# Patient Record
Sex: MALE | Race: WHITE | NOT HISPANIC OR LATINO | URBAN - METROPOLITAN AREA
[De-identification: names, ages, dates, MRNs, and addresses within clinical notes are randomized per-mention and may not be internally consistent; named-entity substitution may affect disease eponyms.]

---

## 2023-03-25 ENCOUNTER — INPATIENT (INPATIENT)
Facility: HOSPITAL | Age: 83
LOS: 11 days | Discharge: ANOTHER HOSPITAL | End: 2023-04-06
Attending: HOSPITALIST | Admitting: HOSPITALIST
Payer: OTHER GOVERNMENT

## 2023-03-25 VITALS
SYSTOLIC BLOOD PRESSURE: 108 MMHG | OXYGEN SATURATION: 97 % | HEIGHT: 67 IN | RESPIRATION RATE: 26 BRPM | TEMPERATURE: 99 F | DIASTOLIC BLOOD PRESSURE: 54 MMHG | HEART RATE: 100 BPM | WEIGHT: 160.06 LBS

## 2023-03-25 LAB
ALBUMIN SERPL ELPH-MCNC: 2.2 G/DL — LOW (ref 3.3–5)
ALP SERPL-CCNC: 106 U/L — SIGNIFICANT CHANGE UP (ref 40–120)
ALT FLD-CCNC: 47 U/L — SIGNIFICANT CHANGE UP (ref 12–78)
ANION GAP SERPL CALC-SCNC: 5 MMOL/L — SIGNIFICANT CHANGE UP (ref 5–17)
AST SERPL-CCNC: 53 U/L — HIGH (ref 15–37)
BASOPHILS # BLD AUTO: 0 K/UL — SIGNIFICANT CHANGE UP (ref 0–0.2)
BASOPHILS NFR BLD AUTO: 0 % — SIGNIFICANT CHANGE UP (ref 0–2)
BILIRUB SERPL-MCNC: 0.4 MG/DL — SIGNIFICANT CHANGE UP (ref 0.2–1.2)
BUN SERPL-MCNC: 27 MG/DL — HIGH (ref 7–23)
CALCIUM SERPL-MCNC: 8.8 MG/DL — SIGNIFICANT CHANGE UP (ref 8.5–10.1)
CHLORIDE SERPL-SCNC: 103 MMOL/L — SIGNIFICANT CHANGE UP (ref 96–108)
CK MB BLD-MCNC: <1.1 % — SIGNIFICANT CHANGE UP (ref 0–3.5)
CK MB CFR SERPL CALC: <1 NG/ML — SIGNIFICANT CHANGE UP (ref 0.5–3.6)
CK SERPL-CCNC: 87 U/L — SIGNIFICANT CHANGE UP (ref 26–308)
CO2 SERPL-SCNC: 28 MMOL/L — SIGNIFICANT CHANGE UP (ref 22–31)
CREAT SERPL-MCNC: 1.32 MG/DL — HIGH (ref 0.5–1.3)
EGFR: 55 ML/MIN/1.73M2 — LOW
EOSINOPHIL # BLD AUTO: 0 K/UL — SIGNIFICANT CHANGE UP (ref 0–0.5)
EOSINOPHIL NFR BLD AUTO: 0 % — SIGNIFICANT CHANGE UP (ref 0–6)
FLUAV AG NPH QL: SIGNIFICANT CHANGE UP
FLUBV AG NPH QL: SIGNIFICANT CHANGE UP
GLUCOSE BLDC GLUCOMTR-MCNC: 127 MG/DL — HIGH (ref 70–99)
GLUCOSE SERPL-MCNC: 156 MG/DL — HIGH (ref 70–99)
HCT VFR BLD CALC: 27 % — LOW (ref 39–50)
HGB BLD-MCNC: 8.4 G/DL — LOW (ref 13–17)
HYPOCHROMIA BLD QL: SLIGHT — SIGNIFICANT CHANGE UP
LYMPHOCYTES # BLD AUTO: 0.7 K/UL — LOW (ref 1–3.3)
LYMPHOCYTES # BLD AUTO: 6 % — LOW (ref 13–44)
MACROCYTES BLD QL: SLIGHT — SIGNIFICANT CHANGE UP
MAGNESIUM SERPL-MCNC: 2.5 MG/DL — SIGNIFICANT CHANGE UP (ref 1.6–2.6)
MANUAL SMEAR VERIFICATION: SIGNIFICANT CHANGE UP
MCHC RBC-ENTMCNC: 31.1 G/DL — LOW (ref 32–36)
MCHC RBC-ENTMCNC: 33.1 PG — SIGNIFICANT CHANGE UP (ref 27–34)
MCV RBC AUTO: 106.3 FL — HIGH (ref 80–100)
MICROCYTES BLD QL: SLIGHT — SIGNIFICANT CHANGE UP
MONOCYTES # BLD AUTO: 0.35 K/UL — SIGNIFICANT CHANGE UP (ref 0–0.9)
MONOCYTES NFR BLD AUTO: 3 % — SIGNIFICANT CHANGE UP (ref 2–14)
NEUTROPHILS # BLD AUTO: 10.67 K/UL — HIGH (ref 1.8–7.4)
NEUTROPHILS NFR BLD AUTO: 91 % — HIGH (ref 43–77)
NRBC # BLD: 0 /100 — SIGNIFICANT CHANGE UP (ref 0–0)
NRBC # BLD: SIGNIFICANT CHANGE UP /100 WBCS (ref 0–0)
NT-PROBNP SERPL-SCNC: HIGH PG/ML (ref 0–450)
OVALOCYTES BLD QL SMEAR: SLIGHT — SIGNIFICANT CHANGE UP
PLAT MORPH BLD: NORMAL — SIGNIFICANT CHANGE UP
PLATELET # BLD AUTO: SIGNIFICANT CHANGE UP K/UL (ref 150–400)
POTASSIUM SERPL-MCNC: 5.4 MMOL/L — HIGH (ref 3.5–5.3)
POTASSIUM SERPL-SCNC: 5.4 MMOL/L — HIGH (ref 3.5–5.3)
PROT SERPL-MCNC: 6.5 GM/DL — SIGNIFICANT CHANGE UP (ref 6–8.3)
RBC # BLD: 2.54 M/UL — LOW (ref 4.2–5.8)
RBC # FLD: 17.3 % — HIGH (ref 10.3–14.5)
RBC BLD AUTO: SIGNIFICANT CHANGE UP
SARS-COV-2 RNA SPEC QL NAA+PROBE: SIGNIFICANT CHANGE UP
SODIUM SERPL-SCNC: 136 MMOL/L — SIGNIFICANT CHANGE UP (ref 135–145)
TROPONIN I, HIGH SENSITIVITY RESULT: 35.2 NG/L — SIGNIFICANT CHANGE UP
TROPONIN I, HIGH SENSITIVITY RESULT: 36.1 NG/L — SIGNIFICANT CHANGE UP
WBC # BLD: 11.72 K/UL — HIGH (ref 3.8–10.5)
WBC # FLD AUTO: 11.72 K/UL — HIGH (ref 3.8–10.5)

## 2023-03-25 PROCEDURE — 99285 EMERGENCY DEPT VISIT HI MDM: CPT

## 2023-03-25 PROCEDURE — 99222 1ST HOSP IP/OBS MODERATE 55: CPT

## 2023-03-25 PROCEDURE — 71045 X-RAY EXAM CHEST 1 VIEW: CPT | Mod: 26

## 2023-03-25 RX ORDER — DEXTROSE 50 % IN WATER 50 %
12.5 SYRINGE (ML) INTRAVENOUS ONCE
Refills: 0 | Status: DISCONTINUED | OUTPATIENT
Start: 2023-03-25 | End: 2023-04-06

## 2023-03-25 RX ORDER — CLOPIDOGREL BISULFATE 75 MG/1
75 TABLET, FILM COATED ORAL DAILY
Refills: 0 | Status: DISCONTINUED | OUTPATIENT
Start: 2023-03-25 | End: 2023-04-06

## 2023-03-25 RX ORDER — LIDOCAINE 4 G/100G
1 CREAM TOPICAL
Refills: 0 | DISCHARGE

## 2023-03-25 RX ORDER — RELUGOLIX 120 MG/1
1 TABLET, FILM COATED ORAL
Refills: 0 | DISCHARGE

## 2023-03-25 RX ORDER — LANOLIN ALCOHOL/MO/W.PET/CERES
3 CREAM (GRAM) TOPICAL AT BEDTIME
Refills: 0 | Status: DISCONTINUED | OUTPATIENT
Start: 2023-03-25 | End: 2023-04-06

## 2023-03-25 RX ORDER — FAMOTIDINE 10 MG/ML
1 INJECTION INTRAVENOUS
Refills: 0 | DISCHARGE

## 2023-03-25 RX ORDER — SODIUM CHLORIDE 9 MG/ML
1000 INJECTION, SOLUTION INTRAVENOUS
Refills: 0 | Status: DISCONTINUED | OUTPATIENT
Start: 2023-03-25 | End: 2023-04-06

## 2023-03-25 RX ORDER — SPIRONOLACTONE 25 MG/1
1 TABLET, FILM COATED ORAL
Refills: 0 | DISCHARGE

## 2023-03-25 RX ORDER — FOLIC ACID 0.8 MG
1 TABLET ORAL
Refills: 0 | DISCHARGE

## 2023-03-25 RX ORDER — DEXTROSE 50 % IN WATER 50 %
15 SYRINGE (ML) INTRAVENOUS ONCE
Refills: 0 | Status: DISCONTINUED | OUTPATIENT
Start: 2023-03-25 | End: 2023-04-06

## 2023-03-25 RX ORDER — ATORVASTATIN CALCIUM 80 MG/1
80 TABLET, FILM COATED ORAL AT BEDTIME
Refills: 0 | Status: DISCONTINUED | OUTPATIENT
Start: 2023-03-25 | End: 2023-04-06

## 2023-03-25 RX ORDER — METOPROLOL TARTRATE 50 MG
1 TABLET ORAL
Refills: 0 | DISCHARGE

## 2023-03-25 RX ORDER — APIXABAN 2.5 MG/1
1 TABLET, FILM COATED ORAL
Refills: 0 | DISCHARGE

## 2023-03-25 RX ORDER — MIRABEGRON 50 MG/1
1 TABLET, EXTENDED RELEASE ORAL
Refills: 0 | DISCHARGE

## 2023-03-25 RX ORDER — CLOPIDOGREL BISULFATE 75 MG/1
1 TABLET, FILM COATED ORAL
Refills: 0 | DISCHARGE

## 2023-03-25 RX ORDER — FLUTICASONE PROPIONATE AND SALMETEROL 50; 250 UG/1; UG/1
1 POWDER ORAL; RESPIRATORY (INHALATION)
Refills: 0 | DISCHARGE

## 2023-03-25 RX ORDER — ONDANSETRON 8 MG/1
4 TABLET, FILM COATED ORAL EVERY 8 HOURS
Refills: 0 | Status: DISCONTINUED | OUTPATIENT
Start: 2023-03-25 | End: 2023-04-06

## 2023-03-25 RX ORDER — METOPROLOL TARTRATE 50 MG
25 TABLET ORAL DAILY
Refills: 0 | Status: DISCONTINUED | OUTPATIENT
Start: 2023-03-25 | End: 2023-03-27

## 2023-03-25 RX ORDER — TAMSULOSIN HYDROCHLORIDE 0.4 MG/1
1 CAPSULE ORAL
Refills: 0 | DISCHARGE

## 2023-03-25 RX ORDER — BUDESONIDE AND FORMOTEROL FUMARATE DIHYDRATE 160; 4.5 UG/1; UG/1
2 AEROSOL RESPIRATORY (INHALATION)
Refills: 0 | Status: DISCONTINUED | OUTPATIENT
Start: 2023-03-25 | End: 2023-04-06

## 2023-03-25 RX ORDER — FUROSEMIDE 40 MG
1 TABLET ORAL
Refills: 0 | DISCHARGE

## 2023-03-25 RX ORDER — INSULIN LISPRO 100/ML
VIAL (ML) SUBCUTANEOUS AT BEDTIME
Refills: 0 | Status: DISCONTINUED | OUTPATIENT
Start: 2023-03-25 | End: 2023-04-06

## 2023-03-25 RX ORDER — MIRTAZAPINE 45 MG/1
1 TABLET, ORALLY DISINTEGRATING ORAL
Refills: 0 | DISCHARGE

## 2023-03-25 RX ORDER — SPIRONOLACTONE 25 MG/1
25 TABLET, FILM COATED ORAL DAILY
Refills: 0 | Status: DISCONTINUED | OUTPATIENT
Start: 2023-03-25 | End: 2023-03-27

## 2023-03-25 RX ORDER — TAMSULOSIN HYDROCHLORIDE 0.4 MG/1
0.4 CAPSULE ORAL AT BEDTIME
Refills: 0 | Status: DISCONTINUED | OUTPATIENT
Start: 2023-03-25 | End: 2023-04-06

## 2023-03-25 RX ORDER — MIRTAZAPINE 45 MG/1
30 TABLET, ORALLY DISINTEGRATING ORAL AT BEDTIME
Refills: 0 | Status: DISCONTINUED | OUTPATIENT
Start: 2023-03-25 | End: 2023-04-06

## 2023-03-25 RX ORDER — DEXTROSE 50 % IN WATER 50 %
25 SYRINGE (ML) INTRAVENOUS ONCE
Refills: 0 | Status: DISCONTINUED | OUTPATIENT
Start: 2023-03-25 | End: 2023-04-06

## 2023-03-25 RX ORDER — APIXABAN 2.5 MG/1
2.5 TABLET, FILM COATED ORAL
Refills: 0 | Status: DISCONTINUED | OUTPATIENT
Start: 2023-03-25 | End: 2023-04-06

## 2023-03-25 RX ORDER — ACETAMINOPHEN 500 MG
650 TABLET ORAL EVERY 6 HOURS
Refills: 0 | Status: DISCONTINUED | OUTPATIENT
Start: 2023-03-25 | End: 2023-04-06

## 2023-03-25 RX ORDER — PREGABALIN 225 MG/1
1000 CAPSULE ORAL DAILY
Refills: 0 | Status: DISCONTINUED | OUTPATIENT
Start: 2023-03-25 | End: 2023-04-06

## 2023-03-25 RX ORDER — TIOTROPIUM BROMIDE 18 UG/1
1 CAPSULE ORAL; RESPIRATORY (INHALATION)
Refills: 0 | DISCHARGE

## 2023-03-25 RX ORDER — FOLIC ACID 0.8 MG
1 TABLET ORAL DAILY
Refills: 0 | Status: DISCONTINUED | OUTPATIENT
Start: 2023-03-25 | End: 2023-04-06

## 2023-03-25 RX ORDER — GLUCAGON INJECTION, SOLUTION 0.5 MG/.1ML
1 INJECTION, SOLUTION SUBCUTANEOUS ONCE
Refills: 0 | Status: DISCONTINUED | OUTPATIENT
Start: 2023-03-25 | End: 2023-04-06

## 2023-03-25 RX ORDER — FUROSEMIDE 40 MG
40 TABLET ORAL
Refills: 0 | Status: DISCONTINUED | OUTPATIENT
Start: 2023-03-25 | End: 2023-03-30

## 2023-03-25 RX ORDER — INSULIN LISPRO 100/ML
VIAL (ML) SUBCUTANEOUS
Refills: 0 | Status: DISCONTINUED | OUTPATIENT
Start: 2023-03-25 | End: 2023-04-06

## 2023-03-25 RX ORDER — ATORVASTATIN CALCIUM 80 MG/1
1 TABLET, FILM COATED ORAL
Refills: 0 | DISCHARGE

## 2023-03-25 RX ORDER — TIOTROPIUM BROMIDE 18 UG/1
2 CAPSULE ORAL; RESPIRATORY (INHALATION) DAILY
Refills: 0 | Status: DISCONTINUED | OUTPATIENT
Start: 2023-03-25 | End: 2023-04-06

## 2023-03-25 RX ADMIN — Medication 40 MILLIGRAM(S): at 19:00

## 2023-03-25 RX ADMIN — ATORVASTATIN CALCIUM 80 MILLIGRAM(S): 80 TABLET, FILM COATED ORAL at 23:26

## 2023-03-25 RX ADMIN — CLOPIDOGREL BISULFATE 75 MILLIGRAM(S): 75 TABLET, FILM COATED ORAL at 22:15

## 2023-03-25 RX ADMIN — TAMSULOSIN HYDROCHLORIDE 0.4 MILLIGRAM(S): 0.4 CAPSULE ORAL at 23:27

## 2023-03-25 RX ADMIN — MIRTAZAPINE 30 MILLIGRAM(S): 45 TABLET, ORALLY DISINTEGRATING ORAL at 23:26

## 2023-03-25 RX ADMIN — APIXABAN 2.5 MILLIGRAM(S): 2.5 TABLET, FILM COATED ORAL at 22:15

## 2023-03-25 NOTE — H&P ADULT - NSHPLABSRESULTS_GEN_ALL_CORE
T(C): 36.8 (03-25-23 @ 19:33), Max: 37.2 (03-25-23 @ 11:15)  HR: 86 (03-25-23 @ 19:33) (81 - 100)  BP: 91/57 (03-25-23 @ 19:33) (91/57 - 108/54)  RR: 23 (03-25-23 @ 19:33) (23 - 26)  SpO2: 100% (03-25-23 @ 19:33) (97% - 100%)                        8.4    11.72 )-----------( CLUMPED    ( 25 Mar 2023 12:15 )             27.0     03-25    136  |  103  |  27<H>  ----------------------------<  156<H>  5.4<H>   |  28  |  1.32<H>    Ca    8.8      25 Mar 2023 12:15    TPro  6.5  /  Alb  2.2<L>  /  TBili  0.4  /  DBili  x   /  AST  53<H>  /  ALT  47  /  AlkPhos  106  03-25    LIVER FUNCTIONS - ( 25 Mar 2023 12:15 )  Alb: 2.2 g/dL / Pro: 6.5 gm/dL / ALK PHOS: 106 U/L / ALT: 47 U/L / AST: 53 U/L / GGT: x           < from: Xray Chest 1 View AP/PA. (03.25.23 @ 12:53) >    IMPRESSION:    Bibasilar patchy opacities likely atelectasis. Underlying infection is   not excluded.  Small bilateral pleural effusions    --- End of Report ---    < end of copied text >    EKG - NSR at 86 bpm, nonsp T changes    MEDICATIONS  (STANDING):  apixaban 2.5 milliGRAM(s) Oral two times a day  clopidogrel Tablet 75 milliGRAM(s) Oral daily  dextrose 5%. 1000 milliLiter(s) (50 mL/Hr) IV Continuous <Continuous>  dextrose 5%. 1000 milliLiter(s) (100 mL/Hr) IV Continuous <Continuous>  dextrose 50% Injectable 25 Gram(s) IV Push once  dextrose 50% Injectable 12.5 Gram(s) IV Push once  dextrose 50% Injectable 25 Gram(s) IV Push once  furosemide   Injectable 40 milliGRAM(s) IV Push two times a day  glucagon  Injectable 1 milliGRAM(s) IntraMuscular once  insulin lispro (ADMELOG) corrective regimen sliding scale   SubCutaneous three times a day before meals  insulin lispro (ADMELOG) corrective regimen sliding scale   SubCutaneous at bedtime  metoprolol succinate ER 25 milliGRAM(s) Oral daily  spironolactone 25 milliGRAM(s) Oral daily    MEDICATIONS  (PRN):  acetaminophen     Tablet .. 650 milliGRAM(s) Oral every 6 hours PRN Temp greater or equal to 38C (100.4F), Mild Pain (1 - 3)  aluminum hydroxide/magnesium hydroxide/simethicone Suspension 30 milliLiter(s) Oral every 4 hours PRN Dyspepsia  dextrose Oral Gel 15 Gram(s) Oral once PRN Blood Glucose LESS THAN 70 milliGRAM(s)/deciliter  melatonin 3 milliGRAM(s) Oral at bedtime PRN Insomnia  ondansetron Injectable 4 milliGRAM(s) IV Push every 8 hours PRN Nausea and/or Vomiting

## 2023-03-25 NOTE — PATIENT PROFILE ADULT - FUNCTIONAL ASSESSMENT - BASIC MOBILITY 6.
1-calculated by average/Not able to assess (calculate score using [facility] averaging method)

## 2023-03-25 NOTE — DOWNTIME INTERRUPTION NOTE - WHICH MANUAL FORMS INITIATED?
TRIAGE  Nursing note/Nursing reassessment  Progress note  Peripheral IV  Fall assessment / SBIRT pre screen  Safety screen  Provider Note  Downtime Imaging orders/tracker  Initial/Medication orders  Admit orders
ER DOWNTIME FORMS, vital signs, assessments, progress notes completed

## 2023-03-25 NOTE — H&P ADULT - HISTORY OF PRESENT ILLNESS
83 y/o M w/ PMHx of CHF, COPD, Prostate Ca, DM type 2, Atrial Flutter on Eliquis, EMANUEL, recently admitted at the [facility] for 4 weeks for PNA, CHF, s/p RT for his Prostate as well, discharged on Life Vest, sent in from  rehab for SOB. Pt reports SOB began the day he was discharged and has been getting worse. Pt also reports nonproductive wet cough related to PNA which he was treated for with IV abx. Pt reports mild L sided chest tightness, denies palpitations or dizziness. Pt reports a PEG tube was placed as he was not eating, but pt reports his appetite is back and he is eating.     Pt initially placed on Bi-PAP on arrival in ED, now SPO2 98% on 4L NC

## 2023-03-25 NOTE — PATIENT PROFILE ADULT - FALL HARM RISK - HARM RISK INTERVENTIONS

## 2023-03-25 NOTE — H&P ADULT - NSICDXPASTMEDICALHX_GEN_ALL_CORE_FT
PAST MEDICAL HISTORY:  Chronic CHF     Chronic kidney disease (CKD)     History of COPD     HLD (hyperlipidemia)     HTN (hypertension)     EMANUEL on CPAP     Prostate cancer     Type 2 diabetes mellitus

## 2023-03-25 NOTE — H&P ADULT - ASSESSMENT
81 y/o M w/ PMHx of CHF, COPD, Prostate Ca, DM type 2, CAD s/p MI Atrial Flutter on Eliquis, [facility], CHF, s/p RT for his Prostate as well, discharged on Life Vest, sent in from  rehab for SOB, pt being admitted for acute on chronic systolic CHF    # Acute on chronic systolic CHF  - start Lasix 40mg IV BID  - Tele monitoring  - Trend troponins  - Follow up 2D Echo  - Strict I/Os, daily wts  - Fluid restriction  - c/w BB, spironolactone  - Life Vest in place  - c/w supplemental oxygen    # Atrial Flutter  - c/w BB  - c/w Eliquis    # DM type 2  - [facility]  - FS qAC and HS w/ SSI    # CAD s/p stents  - c/w plavix, statin    # COPD  - c/w Inhalers    # Prostate Ca  - Pt reports he is s/p 28 day RT  - On Orgovux; nonformulary; pls have family bring in    # DVT ppx - c/w Eliquis    Pt FC            83 y/o M w/ PMHx of CHF, COPD, Prostate Ca, DM type 2, CAD s/p MI Atrial Flutter on Eliquis, [facility], CHF, s/p RT for his Prostate as well, discharged on Life Vest, sent in from  rehab for SOB, pt being admitted for acute on chronic systolic CHF    # Acute on chronic systolic CHF  - start Lasix 40mg IV BID  - Tele monitoring  - Trend troponins  - Follow up 2D Echo  - Strict I/Os, daily wts  - Fluid restriction  - c/w BB, spironolactone  - Life Vest in place  - c/w supplemental oxygen    # Atrial Flutter  - c/w BB  - c/w Eliquis    # Anemia  - Pt w/ hx of Anemia, BL H/H unknown  - no reported BRBPR or melana  - c/w Folic acid, B12    # DM type 2  - Pt not [facility] meds [facility]  - FS qAC and HS w/ SSI    # CAD s/p stents  - c/w plavix, statin    # COPD  - c/w Inhalers    # Prostate Ca  - Pt reports he is s/p 28 day RT  - On Orgovux; nonformulary; pls have family bring in    # DVT ppx - c/w Eliquis    Pt FC

## 2023-03-26 LAB
A1C WITH ESTIMATED AVERAGE GLUCOSE RESULT: 5.8 % — HIGH (ref 4–5.6)
ANION GAP SERPL CALC-SCNC: 5 MMOL/L — SIGNIFICANT CHANGE UP (ref 5–17)
BUN SERPL-MCNC: 24 MG/DL — HIGH (ref 7–23)
CALCIUM SERPL-MCNC: 8.6 MG/DL — SIGNIFICANT CHANGE UP (ref 8.5–10.1)
CHLORIDE SERPL-SCNC: 104 MMOL/L — SIGNIFICANT CHANGE UP (ref 96–108)
CO2 SERPL-SCNC: 29 MMOL/L — SIGNIFICANT CHANGE UP (ref 22–31)
CREAT SERPL-MCNC: 1.23 MG/DL — SIGNIFICANT CHANGE UP (ref 0.5–1.3)
EGFR: 59 ML/MIN/1.73M2 — LOW
ESTIMATED AVERAGE GLUCOSE: 120 MG/DL — HIGH (ref 68–114)
GLUCOSE BLDC GLUCOMTR-MCNC: 116 MG/DL — HIGH (ref 70–99)
GLUCOSE BLDC GLUCOMTR-MCNC: 123 MG/DL — HIGH (ref 70–99)
GLUCOSE BLDC GLUCOMTR-MCNC: 97 MG/DL — SIGNIFICANT CHANGE UP (ref 70–99)
GLUCOSE BLDC GLUCOMTR-MCNC: 98 MG/DL — SIGNIFICANT CHANGE UP (ref 70–99)
GLUCOSE SERPL-MCNC: 98 MG/DL — SIGNIFICANT CHANGE UP (ref 70–99)
HCT VFR BLD CALC: 25.7 % — LOW (ref 39–50)
HGB BLD-MCNC: 8 G/DL — LOW (ref 13–17)
MAGNESIUM SERPL-MCNC: 2.4 MG/DL — SIGNIFICANT CHANGE UP (ref 1.6–2.6)
MCHC RBC-ENTMCNC: 31.1 G/DL — LOW (ref 32–36)
MCHC RBC-ENTMCNC: 32.4 PG — SIGNIFICANT CHANGE UP (ref 27–34)
MCV RBC AUTO: 104 FL — HIGH (ref 80–100)
NRBC # BLD: 0 /100 WBCS — SIGNIFICANT CHANGE UP (ref 0–0)
PLATELET # BLD AUTO: 225 K/UL — SIGNIFICANT CHANGE UP (ref 150–400)
POTASSIUM SERPL-MCNC: 3.9 MMOL/L — SIGNIFICANT CHANGE UP (ref 3.5–5.3)
POTASSIUM SERPL-SCNC: 3.9 MMOL/L — SIGNIFICANT CHANGE UP (ref 3.5–5.3)
RBC # BLD: 2.47 M/UL — LOW (ref 4.2–5.8)
RBC # FLD: 17.1 % — HIGH (ref 10.3–14.5)
SODIUM SERPL-SCNC: 138 MMOL/L — SIGNIFICANT CHANGE UP (ref 135–145)
WBC # BLD: 8 K/UL — SIGNIFICANT CHANGE UP (ref 3.8–10.5)
WBC # FLD AUTO: 8 K/UL — SIGNIFICANT CHANGE UP (ref 3.8–10.5)

## 2023-03-26 PROCEDURE — 99232 SBSQ HOSP IP/OBS MODERATE 35: CPT

## 2023-03-26 RX ORDER — MIDODRINE HYDROCHLORIDE 2.5 MG/1
5 TABLET ORAL THREE TIMES A DAY
Refills: 0 | Status: DISCONTINUED | OUTPATIENT
Start: 2023-03-26 | End: 2023-03-31

## 2023-03-26 RX ADMIN — MIDODRINE HYDROCHLORIDE 5 MILLIGRAM(S): 2.5 TABLET ORAL at 17:30

## 2023-03-26 RX ADMIN — ATORVASTATIN CALCIUM 80 MILLIGRAM(S): 80 TABLET, FILM COATED ORAL at 22:08

## 2023-03-26 RX ADMIN — TIOTROPIUM BROMIDE 2 PUFF(S): 18 CAPSULE ORAL; RESPIRATORY (INHALATION) at 12:02

## 2023-03-26 RX ADMIN — APIXABAN 2.5 MILLIGRAM(S): 2.5 TABLET, FILM COATED ORAL at 05:44

## 2023-03-26 RX ADMIN — CLOPIDOGREL BISULFATE 75 MILLIGRAM(S): 75 TABLET, FILM COATED ORAL at 11:28

## 2023-03-26 RX ADMIN — MIRTAZAPINE 30 MILLIGRAM(S): 45 TABLET, ORALLY DISINTEGRATING ORAL at 22:08

## 2023-03-26 RX ADMIN — Medication 1 MILLIGRAM(S): at 11:27

## 2023-03-26 RX ADMIN — BUDESONIDE AND FORMOTEROL FUMARATE DIHYDRATE 2 PUFF(S): 160; 4.5 AEROSOL RESPIRATORY (INHALATION) at 17:31

## 2023-03-26 RX ADMIN — PREGABALIN 1000 MICROGRAM(S): 225 CAPSULE ORAL at 12:02

## 2023-03-26 RX ADMIN — BUDESONIDE AND FORMOTEROL FUMARATE DIHYDRATE 2 PUFF(S): 160; 4.5 AEROSOL RESPIRATORY (INHALATION) at 05:44

## 2023-03-26 RX ADMIN — TAMSULOSIN HYDROCHLORIDE 0.4 MILLIGRAM(S): 0.4 CAPSULE ORAL at 22:08

## 2023-03-26 RX ADMIN — APIXABAN 2.5 MILLIGRAM(S): 2.5 TABLET, FILM COATED ORAL at 17:30

## 2023-03-26 NOTE — PROGRESS NOTE ADULT - ASSESSMENT
81 y/o M w/ PMHx of CHF, COPD, Prostate Ca, DM type 2, CAD s/p MI Atrial Flutter on Eliquis, [facility], CHF, s/p RT for his Prostate as well, discharged on Life Vest, sent in from  rehab for SOB, pt being admitted for acute on chronic systolic CHF    # Acute on chronic systolic CHF  - continue Lasix 40mg IV BID as pt tolerates   - Tele monitoring  - Trend troponin x2 low   - Follow up 2D Echo  - Strict I/Os, daily wts  - Fluid restriction  - c/w BB, spironolactone  - Life Vest in place  - c/w supplemental oxygen  -Cardiology Consult in am ---[facility] Sam[facility]     # Hypotension   -will start midodrine    # Atrial Flutter  - c/w BB  - c/w Eliquis    # Anemia  - Pt w/ hx of Anemia, BL H/H unknown  - no reported BRBPR or melana  - c/w Folic acid, B12    # DM type 2  - Pt not [facility] med[facility]  - FS qAC and HS w/ SSI    # CAD s/p stents  - c/w plavix, statin    # COPD  - c/w Inhalers    # Prostate Ca  - Pt reports he is s/p 28 day RT  - On Orgovux; nonformulary; pls have family bring in    # DVT ppx - c/w Eliquis

## 2023-03-26 NOTE — PROGRESS NOTE ADULT - SUBJECTIVE AND OBJECTIVE BOX
Patient is a 82y old  Male who presents with a chief complaint of Acute on Chronic CHF (25 Mar 2023 20:08)      INTERVAL HPI/OVERNIGHT EVENTS: pt denies feeling sob anymore,    MEDICATIONS  (STANDING):  apixaban 2.5 milliGRAM(s) Oral two times a day  atorvastatin 80 milliGRAM(s) Oral at bedtime  budesonide 160 MICROgram(s)/formoterol 4.5 MICROgram(s) Inhaler 2 Puff(s) Inhalation two times a day  clopidogrel Tablet 75 milliGRAM(s) Oral daily  cyanocobalamin 1000 MICROGram(s) Oral daily  dextrose 5%. 1000 milliLiter(s) (50 mL/Hr) IV Continuous <Continuous>  dextrose 5%. 1000 milliLiter(s) (100 mL/Hr) IV Continuous <Continuous>  dextrose 50% Injectable 25 Gram(s) IV Push once  dextrose 50% Injectable 12.5 Gram(s) IV Push once  dextrose 50% Injectable 25 Gram(s) IV Push once  folic acid 1 milliGRAM(s) Oral daily  furosemide   Injectable 40 milliGRAM(s) IV Push two times a day  glucagon  Injectable 1 milliGRAM(s) IntraMuscular once  insulin lispro (ADMELOG) corrective regimen sliding scale   SubCutaneous three times a day before meals  insulin lispro (ADMELOG) corrective regimen sliding scale   SubCutaneous at bedtime  metoprolol succinate ER 25 milliGRAM(s) Oral daily  mirtazapine 30 milliGRAM(s) Oral at bedtime  spironolactone 25 milliGRAM(s) Oral daily  tamsulosin 0.4 milliGRAM(s) Oral at bedtime  tiotropium 2.5 MICROgram(s) Inhaler 2 Puff(s) Inhalation daily    MEDICATIONS  (PRN):  acetaminophen     Tablet .. 650 milliGRAM(s) Oral every 6 hours PRN Temp greater or equal to 38C (100.4F), Mild Pain (1 - 3)  aluminum hydroxide/magnesium hydroxide/simethicone Suspension 30 milliLiter(s) Oral every 4 hours PRN Dyspepsia  dextrose Oral Gel 15 Gram(s) Oral once PRN Blood Glucose LESS THAN 70 milliGRAM(s)/deciliter  melatonin 3 milliGRAM(s) Oral at bedtime PRN Insomnia  ondansetron Injectable 4 milliGRAM(s) IV Push every 8 hours PRN Nausea and/or Vomiting      Allergies    Hytrin (Unknown)  peanuts (Unknown)  rabeprazole (Unknown)    Intolerances        REVIEW OF SYSTEMS:  CONSTITUTIONAL: No fever, weight loss, or fatigue  EYES: No eye pain, visual disturbances, or discharge  ENMT:  No difficulty hearing, tinnitus, vertigo; No sinus or throat pain  NECK: No pain or stiffness  BREASTS: No pain, masses, or nipple discharge  RESPIRATORY: No cough, wheezing, chills or hemoptysis; No shortness of breath  CARDIOVASCULAR: No chest pain, palpitations, dizziness, or leg swelling  GASTROINTESTINAL: No abdominal or epigastric pain. No nausea, vomiting, or hematemesis; No diarrhea or constipation. No melena or hematochezia.  GENITOURINARY: No dysuria, frequency, hematuria, or incontinence  NEUROLOGICAL: No headaches, memory loss, loss of strength, numbness, or tremors  SKIN: No itching, burning, rashes, or lesions   LYMPH NODES: No enlarged glands  ENDOCRINE: No heat or cold intolerance; No hair loss  MUSCULOSKELETAL: No joint pain or swelling; No muscle, back, or extremity pain  PSYCHIATRIC: No depression, anxiety, mood swings, or difficulty sleeping  HEME/LYMPH: No easy bruising, or bleeding gums  ALLERGY AND IMMUNOLOGIC: No hives or eczema    Vital Signs Last 24 Hrs  T(C): 36.8 (26 Mar 2023 10:56), Max: 36.9 (26 Mar 2023 00:38)  T(F): 98.2 (26 Mar 2023 10:56), Max: 98.4 (26 Mar 2023 00:38)  HR: 94 (26 Mar 2023 12:56) (60 - 94)  BP: 96/62 (26 Mar 2023 12:56) (91/57 - 104/63)  BP(mean): --  RR: 18 (26 Mar 2023 12:56) (18 - 23)  SpO2: 100% (26 Mar 2023 12:56) (94% - 100%)    Parameters below as of 26 Mar 2023 12:56  Patient On (Oxygen Delivery Method): nasal cannula  O2 Flow (L/min): 3      PHYSICAL EXAM:  GENERAL: NAD, well-groomed, well-developed  HEAD:  Atraumatic, Normocephalic  EYES: EOMI, PERRLA, conjunctiva and sclera clear  ENMT: No tonsillar erythema, exudates, or enlargement; Moist mucous membranes, Good dentition, No lesions  NECK: Supple, No JVD, Normal thyroid  NERVOUS SYSTEM:  Alert & Oriented X3, Good concentration; Motor Strength 5/5 B/L upper and lower extremities; DTRs 2+ intact and symmetric  CHEST/LUNG: Clear to percussion bilaterally; No rales, rhonchi, wheezing, or rubs, LIfE VEST On   HEART: Regular rate and rhythm; No murmurs, rubs, or gallops  ABDOMEN: Soft, Nontender, Nondistended; Bowel sounds present  EXTREMITIES:  2+ Peripheral Pulses, No clubbing, cyanosis, or edema  SKIN: No rashes or lesions    LABS:                        8.0    8.00  )-----------( 225      ( 26 Mar 2023 07:00 )             25.7     03-26    138  |  104  |  24<H>  ----------------------------<  98  3.9   |  29  |  1.23    Ca    8.6      26 Mar 2023 07:00  Mg     2.4     03-26    TPro  6.5  /  Alb  2.2<L>  /  TBili  0.4  /  DBili  x   /  AST  53<H>  /  ALT  47  /  AlkPhos  106  03-25        CAPILLARY BLOOD GLUCOSE      POCT Blood Glucose.: 97 mg/dL (26 Mar 2023 11:18)  POCT Blood Glucose.: 98 mg/dL (26 Mar 2023 07:59)  POCT Blood Glucose.: 127 mg/dL (25 Mar 2023 21:18)      RADIOLOGY & ADDITIONAL TESTS:    Imaging Personally Reviewed:  [ ] YES  [ ] NO    Consultant(s) Notes Reviewed:  [ ] YES  [ ] NO    Care Discussed with Consultants/Other Providers [ ] YES  [ ] NO

## 2023-03-27 LAB
ANION GAP SERPL CALC-SCNC: 4 MMOL/L — LOW (ref 5–17)
BUN SERPL-MCNC: 21 MG/DL — SIGNIFICANT CHANGE UP (ref 7–23)
CALCIUM SERPL-MCNC: 8.4 MG/DL — LOW (ref 8.5–10.1)
CHLORIDE SERPL-SCNC: 108 MMOL/L — SIGNIFICANT CHANGE UP (ref 96–108)
CO2 SERPL-SCNC: 29 MMOL/L — SIGNIFICANT CHANGE UP (ref 22–31)
CREAT SERPL-MCNC: 1.33 MG/DL — HIGH (ref 0.5–1.3)
EGFR: 54 ML/MIN/1.73M2 — LOW
GLUCOSE BLDC GLUCOMTR-MCNC: 104 MG/DL — HIGH (ref 70–99)
GLUCOSE BLDC GLUCOMTR-MCNC: 116 MG/DL — HIGH (ref 70–99)
GLUCOSE BLDC GLUCOMTR-MCNC: 90 MG/DL — SIGNIFICANT CHANGE UP (ref 70–99)
GLUCOSE BLDC GLUCOMTR-MCNC: 99 MG/DL — SIGNIFICANT CHANGE UP (ref 70–99)
GLUCOSE SERPL-MCNC: 103 MG/DL — HIGH (ref 70–99)
HCT VFR BLD CALC: 25.3 % — LOW (ref 39–50)
HGB BLD-MCNC: 7.9 G/DL — LOW (ref 13–17)
MAGNESIUM SERPL-MCNC: 2.5 MG/DL — SIGNIFICANT CHANGE UP (ref 1.6–2.6)
MCHC RBC-ENTMCNC: 31.2 G/DL — LOW (ref 32–36)
MCHC RBC-ENTMCNC: 32.9 PG — SIGNIFICANT CHANGE UP (ref 27–34)
MCV RBC AUTO: 105.4 FL — HIGH (ref 80–100)
NRBC # BLD: 0 /100 WBCS — SIGNIFICANT CHANGE UP (ref 0–0)
PLATELET # BLD AUTO: 225 K/UL — SIGNIFICANT CHANGE UP (ref 150–400)
POTASSIUM SERPL-MCNC: 4.1 MMOL/L — SIGNIFICANT CHANGE UP (ref 3.5–5.3)
POTASSIUM SERPL-SCNC: 4.1 MMOL/L — SIGNIFICANT CHANGE UP (ref 3.5–5.3)
RBC # BLD: 2.4 M/UL — LOW (ref 4.2–5.8)
RBC # FLD: 16.8 % — HIGH (ref 10.3–14.5)
SODIUM SERPL-SCNC: 141 MMOL/L — SIGNIFICANT CHANGE UP (ref 135–145)
WBC # BLD: 5.64 K/UL — SIGNIFICANT CHANGE UP (ref 3.8–10.5)
WBC # FLD AUTO: 5.64 K/UL — SIGNIFICANT CHANGE UP (ref 3.8–10.5)

## 2023-03-27 PROCEDURE — 99232 SBSQ HOSP IP/OBS MODERATE 35: CPT

## 2023-03-27 PROCEDURE — 99223 1ST HOSP IP/OBS HIGH 75: CPT

## 2023-03-27 RX ORDER — METOPROLOL TARTRATE 50 MG
25 TABLET ORAL DAILY
Refills: 0 | Status: DISCONTINUED | OUTPATIENT
Start: 2023-03-27 | End: 2023-04-06

## 2023-03-27 RX ORDER — POLYETHYLENE GLYCOL 3350 17 G/17G
17 POWDER, FOR SOLUTION ORAL DAILY
Refills: 0 | Status: DISCONTINUED | OUTPATIENT
Start: 2023-03-27 | End: 2023-03-30

## 2023-03-27 RX ORDER — SPIRONOLACTONE 25 MG/1
25 TABLET, FILM COATED ORAL DAILY
Refills: 0 | Status: DISCONTINUED | OUTPATIENT
Start: 2023-03-27 | End: 2023-04-04

## 2023-03-27 RX ORDER — DAPAGLIFLOZIN 10 MG/1
10 TABLET, FILM COATED ORAL EVERY 24 HOURS
Refills: 0 | Status: DISCONTINUED | OUTPATIENT
Start: 2023-03-27 | End: 2023-04-01

## 2023-03-27 RX ADMIN — Medication 1 MILLIGRAM(S): at 11:42

## 2023-03-27 RX ADMIN — BUDESONIDE AND FORMOTEROL FUMARATE DIHYDRATE 2 PUFF(S): 160; 4.5 AEROSOL RESPIRATORY (INHALATION) at 05:15

## 2023-03-27 RX ADMIN — ATORVASTATIN CALCIUM 80 MILLIGRAM(S): 80 TABLET, FILM COATED ORAL at 22:41

## 2023-03-27 RX ADMIN — PREGABALIN 1000 MICROGRAM(S): 225 CAPSULE ORAL at 11:42

## 2023-03-27 RX ADMIN — TAMSULOSIN HYDROCHLORIDE 0.4 MILLIGRAM(S): 0.4 CAPSULE ORAL at 22:28

## 2023-03-27 RX ADMIN — MIDODRINE HYDROCHLORIDE 5 MILLIGRAM(S): 2.5 TABLET ORAL at 11:42

## 2023-03-27 RX ADMIN — BUDESONIDE AND FORMOTEROL FUMARATE DIHYDRATE 2 PUFF(S): 160; 4.5 AEROSOL RESPIRATORY (INHALATION) at 17:29

## 2023-03-27 RX ADMIN — MIRTAZAPINE 30 MILLIGRAM(S): 45 TABLET, ORALLY DISINTEGRATING ORAL at 22:28

## 2023-03-27 RX ADMIN — APIXABAN 2.5 MILLIGRAM(S): 2.5 TABLET, FILM COATED ORAL at 17:28

## 2023-03-27 RX ADMIN — APIXABAN 2.5 MILLIGRAM(S): 2.5 TABLET, FILM COATED ORAL at 05:15

## 2023-03-27 RX ADMIN — MIDODRINE HYDROCHLORIDE 5 MILLIGRAM(S): 2.5 TABLET ORAL at 17:29

## 2023-03-27 RX ADMIN — TIOTROPIUM BROMIDE 2 PUFF(S): 18 CAPSULE ORAL; RESPIRATORY (INHALATION) at 12:05

## 2023-03-27 RX ADMIN — CLOPIDOGREL BISULFATE 75 MILLIGRAM(S): 75 TABLET, FILM COATED ORAL at 11:42

## 2023-03-27 RX ADMIN — MIDODRINE HYDROCHLORIDE 5 MILLIGRAM(S): 2.5 TABLET ORAL at 05:15

## 2023-03-27 RX ADMIN — Medication 40 MILLIGRAM(S): at 14:02

## 2023-03-27 NOTE — CONSULT NOTE ADULT - SUBJECTIVE AND OBJECTIVE BOX
CARDIOLOGY CONSULTATION NOTE                                                                             KENDRICK WEBER is a 82y Male w/ PMHx of CHF, COPD, Prostate Ca, DM type 2, Atrial Flutter on Eliquis, EMANUEL, recently admitted at the [facility] for 4 weeks for PNA, CHF, s/p RT for his Prostate as well, discharged on Life Vest, sent in from  rehab for SOB. Pt reports SOB began the day he was discharged and has been getting worse. Pt also reports nonproductive wet cough related to PNA which he was treated for with IV abx. Pt reports mild L sided chest tightness, denies palpitations or dizziness. Pt reports a PEG tube was placed as he was not eating, but pt reports his appetite is back and he is eating.   Pt initially placed on Bi-PAP on arrival in ED, now SPO2 98% on 4L NC   REVIEW OF SYSTEMS: -----------------------------  CONSTITUTIONAL: No fever, weight loss, or fatigue EYES: No eye pain, visual disturbances, or discharge ENMT:  No difficulty hearing, tinnitus, vertigo; No sinus or throat pain NECK: No pain or stiffness BREASTS: No pain, masses, or nipple discharge RESPIRATORY: No cough, wheezing, chills or hemoptysis; No shortness of breath CARDIOVASCULAR: See HPI GASTROINTESTINAL: No abdominal or epigastric pain. No nausea, vomiting, or hematemesis; No diarrhea or constipation. No melena or hematochezia. GENITOURINARY: No dysuria, frequency, hematuria, or incontinence NEUROLOGICAL: No headaches, memory loss, loss of strength, numbness, or tremors SKIN: No itching, burning, rashes, or lesions  LYMPH NODES: No enlarged glands ENDOCRINE: No heat or cold intolerance; No hair loss MUSCULOSKELETAL: No joint pain or swelling; No muscle, back, or extremity pain PSYCHIATRIC: No depression, anxiety, mood swings, or difficulty sleeping HEME/LYMPH: No easy bruising, or bleeding gums ALLERGY AND IMMUNOLOGIC: No hives or eczema  Home Medications: Advair Diskus 500 mcg-50 mcg inhalation powder: 1 inhaled 2 times a day (25 Mar 2023 20:18) atorvastatin 80 mg oral tablet: 1 orally once a day (at bedtime) (25 Mar 2023 20:23) Eliquis 2.5 mg oral tablet: 1 orally 2 times a day (25 Mar 2023 20:23) famotidine 20 mg oral tablet: 1 orally once a day (25 Mar 2023 20:25) folic acid 1 mg oral tablet: 1 orally once a day (25 Mar 2023 20:27) furosemide 20 mg oral tablet: 1 orally once a day (25 Mar 2023 20:24) lidocaine 5% topical film: Apply topically to affected area once a day (25 Mar 2023 20:21) mirtazapine 30 mg oral tablet: 1 orally once a day (at bedtime) (25 Mar 2023 20:25) Myrbetriq 25 mg oral tablet, extended release: 1 orally once a day (25 Mar 2023 20:20) Orgovyx 120 mg oral tablet: 1 orally once a day (25 Mar 2023 20:22) Plavix 75 mg oral tablet: 1 orally once a day (25 Mar 2023 20:22) Spiriva 18 mcg inhalation capsule: 1 inhaled once a day (25 Mar 2023 20:18) spironolactone 25 mg oral tablet: 1 orally once a day (25 Mar 2023 20:24) tamsulosin 0.4 mg oral capsule: 1 orally once a day (25 Mar 2023 20:26) Toprol-XL 25 mg oral tablet, extended release: 1 orally once a day (25 Mar 2023 20:23)   MEDICATIONS  (STANDING): apixaban 2.5 milliGRAM(s) Oral two times a day atorvastatin 80 milliGRAM(s) Oral at bedtime budesonide 160 MICROgram(s)/formoterol 4.5 MICROgram(s) Inhaler 2 Puff(s) Inhalation two times a day clopidogrel Tablet 75 milliGRAM(s) Oral daily cyanocobalamin 1000 MICROGram(s) Oral daily folic acid 1 milliGRAM(s) Oral daily furosemide   Injectable 40 milliGRAM(s) IV Push two times a day glucagon  Injectable 1 milliGRAM(s) IntraMuscular once insulin lispro (ADMELOG) corrective regimen sliding scale   SubCutaneous three times a day before meals insulin lispro (ADMELOG) corrective regimen sliding scale   SubCutaneous at bedtime metoprolol succinate ER 25 milliGRAM(s) Oral daily midodrine. 5 milliGRAM(s) Oral three times a day mirtazapine 30 milliGRAM(s) Oral at bedtime spironolactone 25 milliGRAM(s) Oral daily tamsulosin 0.4 milliGRAM(s) Oral at bedtime tiotropium 2.5 MICROgram(s) Inhaler 2 Puff(s) Inhalation daily   ALLERGIES: Hytrin (Unknown) peanuts (Unknown) rabeprazole (Unknown)   FAMILY HISTORY: No pertinent family history in first degree relatives    PHYSICAL EXAMINATION: ----------------------------- T(C): 36.8 (03-27-23 @ 10:38), Max: 37 (03-26-23 @ 16:03) HR: 86 (03-27-23 @ 10:38) (74 - 89) BP: 96/63 (03-27-23 @ 10:38) (96/63 - 113/71) RR: 18 (03-27-23 @ 10:38) (18 - 18) SpO2: 90% (03-27-23 @ 10:38) (90% - 97%) Wt(kg): --  03-26 @ 07:01  -  03-27 @ 07:00 -------------------------------------------------------- IN:   Oral Fluid: 240 mL Total IN: 240 mL  OUT:   Voided (mL): 500 mL Total OUT: 500 mL  Total NET: -260 mL   03-27 @ 07:01  -  03-27 @ 15:02 -------------------------------------------------------- IN:   Oral Fluid: 540 mL Total IN: 540 mL  OUT: Total OUT: 0 mL  Total NET: 540 mL   Constitutional: well developed, normal appearance, well groomed, well nourished, no deformities and no acute distress.  Eyes: the conjunctiva exhibited no abnormalities and the eyelids demonstrated no xanthelasmas.  HEENT: normal oral mucosa, no oral pallor and no oral cyanosis.  Neck: normal jugular venous A waves present, normal jugular venous V waves present and no jugular venous knight A waves.  Pulmonary: no respiratory distress, normal respiratory rhythm and effort, no accessory muscle use and lungs were clear to auscultation bilaterally.  Cardiovascular: heart rate and rhythm were normal, normal S1 and S2 and no murmur, gallop, rub, heave or thrill are present.  Abdomen: soft, non-tender, no hepato-splenomegaly and no abdominal mass palpated.  Musculoskeletal: the gait could not be assessed..  Extremities: no clubbing of the fingernails, no localized cyanosis, no petechial hemorrhages and no ischemic changes.  Skin: normal skin color and pigmentation, no rash, no venous stasis, no skin lesions, no skin ulcer and no xanthoma was observed.  Psychiatric: oriented to person, place, and time, the affect was normal, the mood was normal and not feeling anxious.   ECG: -------    LABS:  -------- 03-27  141  |  108  |  21 ----------------------------<  103<H> 4.1   |  29  |  1.33<H>  Ca    8.4<L>      27 Mar 2023 07:38 Mg     2.5     03-27                        7.9   5.64  )-----------( 225      ( 27 Mar 2023 07:38 )            25.3          RADIOLOGY REPORTS: ----------------------------- < from: Xray Chest 1 View AP/PA. (03.25.23 @ 12:53) >  ACC: [id number] EXAM:  XR CHEST AP OR PA 1V   ORDERED BY: PHILIPP ALFORD   PROCEDURE DATE:  03/25/2023      INTERPRETATION:  TECHNIQUE: A single AP view of the chest was obtained.  Ordered time:   3/25/2023 12:53 PM  COMPARISON: None  CLINICAL INFORMATION: Shortness of breath  FINDINGS: Multiple lines and devices overlie the chest limiting evaluation. The heart is not well assessed on an AP film. There are small bilateral pleural effusions present. There are bibasilar patchy opacities, likely atelectasis There is no pneumothorax.  IMPRESSION:  Bibasilar patchy opacities likely atelectasis. Underlying infection is  not excluded. Small bilateral pleural effusions  --- End of Report ---      OTONIEL TORRES MD; Attending Radiologist This document has been electronically signed. Mar 25 2023  2:33PM  < end of copied text >    ECHOCARDIOGRAM: --------------------------- pending    CARDIOLOGY CONSULTATION NOTE                                                                             KENDRICK WEBER is a 82y Male w/ PMHx of CHF, COPD, Prostate Ca, DM type 2, Atrial Flutter on Eliquis, EMANUEL, [facility] CHF, s/p RT for his Prostate as well. Patient had also been transferred from [facility] to [place] in order to perform cardiac cath for ?chest pain but never received cath. He was discharged on Life Vest, (patient admits to hearing about his cardiomyopathy) and was referred to [facility] for rehab. He states that he started having periods of dyspnea amost immediately after arriving in NH and asked for O2 and nebs whih he didn't receive. Dyspnea got worse and he was referred to ED. Pt also reports nonproductive wet cough related to PNA which he was treated for with IV abx. Pt reports mild L sided chest tightness, denies palpitations or dizziness. Pt reports having had a PEG tube  placed recently as he was not eating, but pt reports his appetite is back and he is eating.   Pt initially placed on Bi-PAP on arrival in ED, now SPO2 98% on 4L NC and he denies any further dyspnea.  [facility] and he doesn't know where he is supposed to followup after he gets out of rehab. No mention made of bridge to [facility].  REVIEW OF SYSTEMS: -----------------------------  CONSTITUTIONAL: No fever, weight loss, or fatigue EYES: No eye pain, visual disturbances, or discharge ENMT:  No difficulty hearing, tinnitus, vertigo; No sinus or throat pain NECK: No pain or stiffness BREASTS: No pain, masses, or nipple discharge RESPIRATORY: No cough, wheezing, chills or hemoptysis; No shortness of breath CARDIOVASCULAR: See HPI GASTROINTESTINAL: No abdominal or epigastric pain. No nausea, vomiting, or hematemesis; No diarrhea or constipation. No melena or hematochezia. GENITOURINARY: No dysuria, frequency, hematuria, or incontinence NEUROLOGICAL: No headaches, memory loss, loss of strength, numbness, or tremors SKIN: allrgic reaction to yellow dye, stasis changes to LE's  LYMPH NODES: No enlarged glands ENDOCRINE: No heat or cold intolerance; No hair loss MUSCULOSKELETAL: No joint pain or swelling; No muscle, back, or extremity pain PSYCHIATRIC: No depression, anxiety, mood swings, or difficulty sleeping HEME/LYMPH: No easy bruising, or bleeding gums ALLERGY AND IMMUNOLOGIC: No hives or eczema  Home Medications: Advair Diskus 500 mcg-50 mcg inhalation powder: 1 inhaled 2 times a day (25 Mar 2023 20:18) atorvastatin 80 mg oral tablet: 1 orally once a day (at bedtime) (25 Mar 2023 20:23) Eliquis 2.5 mg oral tablet: 1 orally 2 times a day (25 Mar 2023 20:23) famotidine 20 mg oral tablet: 1 orally once a day (25 Mar 2023 20:25) folic acid 1 mg oral tablet: 1 orally once a day (25 Mar 2023 20:27) furosemide 20 mg oral tablet: 1 orally once a day (25 Mar 2023 20:24) lidocaine 5% topical film: Apply topically to affected area once a day (25 Mar 2023 20:21) mirtazapine 30 mg oral tablet: 1 orally once a day (at bedtime) (25 Mar 2023 20:25) Myrbetriq 25 mg oral tablet, extended release: 1 orally once a day (25 Mar 2023 20:20) Orgovyx 120 mg oral tablet: 1 orally once a day (25 Mar 2023 20:22) Plavix 75 mg oral tablet: 1 orally once a day (25 Mar 2023 20:22) Spiriva 18 mcg inhalation capsule: 1 inhaled once a day (25 Mar 2023 20:18) spironolactone 25 mg oral tablet: 1 orally once a day (25 Mar 2023 20:24) tamsulosin 0.4 mg oral capsule: 1 orally once a day (25 Mar 2023 20:26) Toprol-XL 25 mg oral tablet, extended release: 1 orally once a day (25 Mar 2023 20:23)   MEDICATIONS  (STANDING): apixaban 2.5 milliGRAM(s) Oral two times a day atorvastatin 80 milliGRAM(s) Oral at bedtime budesonide 160 MICROgram(s)/formoterol 4.5 MICROgram(s) Inhaler 2 Puff(s) Inhalation two times a day clopidogrel Tablet 75 milliGRAM(s) Oral daily cyanocobalamin 1000 MICROGram(s) Oral daily folic acid 1 milliGRAM(s) Oral daily furosemide   Injectable 40 milliGRAM(s) IV Push two times a day glucagon  Injectable 1 milliGRAM(s) IntraMuscular once insulin lispro (ADMELOG) corrective regimen sliding scale   SubCutaneous three times a day before meals insulin lispro (ADMELOG) corrective regimen sliding scale   SubCutaneous at bedtime metoprolol succinate ER 25 milliGRAM(s) Oral daily midodrine. 5 milliGRAM(s) Oral three times a day mirtazapine 30 milliGRAM(s) Oral at bedtime spironolactone 25 milliGRAM(s) Oral daily tamsulosin 0.4 milliGRAM(s) Oral at bedtime tiotropium 2.5 MICROgram(s) Inhaler 2 Puff(s) Inhalation daily   ALLERGIES: Hytrin (Unknown) peanuts (Unknown) rabeprazole (Unknown)   FAMILY HISTORY: No pertinent family history in first degree relatives    PHYSICAL EXAMINATION: ----------------------------- T(C): 36.8 (03-27-23 @ 10:38), Max: 37 (03-26-23 @ 16:03) HR: 86 (03-27-23 @ 10:38) (74 - 89) BP: 96/63 (03-27-23 @ 10:38) (96/63 - 113/71) RR: 18 (03-27-23 @ 10:38) (18 - 18) SpO2: 90% (03-27-23 @ 10:38) (90% - 97%) Wt(kg): --  03-26 @ 07:01  -  03-27 @ 07:00 -------------------------------------------------------- IN:   Oral Fluid: 240 mL Total IN: 240 mL  OUT:   Voided (mL): 500 mL Total OUT: 500 mL  Total NET: -260 mL   03-27 @ 07:01  -  03-27 @ 15:02 -------------------------------------------------------- IN:   Oral Fluid: 540 mL Total IN: 540 mL  OUT: Total OUT: 0 mL  Total NET: 540 mL   Constitutional: well developed, normal appearance, well groomed, well nourished, no deformities and no acute distress.  Eyes: the conjunctiva exhibited no abnormalities and the eyelids demonstrated no xanthelasmas.  HEENT: normal oral mucosa, no oral pallor and no oral cyanosis.  Neck: normal jugular venous A waves present, normal jugular venous V waves present and no jugular venous knight A waves.  Pulmonary: no respiratory distress, normal respiratory rhythm and effort, no accessory muscle use and lungs were clear to auscultation bilaterally.  Cardiovascular: heart rate and rhythm were normal, diminished S1 and normal S2 and no murmur, gallop, rub, heave or thrill are present.  Abdomen: soft, non-tender, no hepato-splenomegaly and no abdominal mass palpated.  Musculoskeletal: the gait could not be assessed..  Extremities: no clubbing of the fingernails, no localized cyanosis, no petechial hemorrhages and no ischemic changes.  Skin: hypopigmentation in bilateral LE's. No rash, no venous stasis, no skin lesions, no skin ulcer and no xanthoma was observed.  Psychiatric: oriented to person, place, and time, the affect was normal, the mood was normal and not feeling anxious.   ECG: -------  3/27, NSR 86 bpm, NSTTW changes  LABS:  -------- 03-27  141  |  108  |  21 ----------------------------<  103<H> 4.1   |  29  |  1.33<H>  Ca    8.4<L>      27 Mar 2023 07:38 Mg     2.5     03-27                        7.9   5.64  )-----------( 225      ( 27 Mar 2023 07:38 )            25.3          RADIOLOGY REPORTS: ----------------------------- < from: Xray Chest 1 View AP/PA. (03.25.23 @ 12:53) >  ACC: [id number] EXAM:  XR CHEST AP OR PA 1V   ORDERED BY: PHILIPP ALFORD   PROCEDURE DATE:  03/25/2023      INTERPRETATION:  TECHNIQUE: A single AP view of the chest was obtained.  Ordered time:   3/25/2023 12:53 PM  COMPARISON: None  CLINICAL INFORMATION: Shortness of breath  FINDINGS: Multiple lines and devices overlie the chest limiting evaluation. The heart is not well assessed on an AP film. There are small bilateral pleural effusions present. There are bibasilar patchy opacities, likely atelectasis There is no pneumothorax.  IMPRESSION:  Bibasilar patchy opacities likely atelectasis. Underlying infection is  not excluded. Small bilateral pleural effusions  --- End of Report ---      OTONIEL TORRES MD; Attending Radiologist This document has been electronically signed. Mar 25 2023  2:33PM  < end of copied text >    ECHOCARDIOGRAM: --------------------------- pending

## 2023-03-27 NOTE — CONSULT NOTE ADULT - ASSESSMENT
The chart has been reviewed but the patient has not yet been examined.  Full note to follow. 83 yo male with likely history of ischemic cardiomyopathy. Also chr AF, DM2, COPD.  [facility], [facility].  Here for acute dyspnea x one day and productive cough, likely sec to exacerbation of CHF.  No evidence of acute ischemia, but cath [facility] at [facility].  Is on life vest; this should be removed while on monitor in hospital, patient needs education regarding ts use prior to discharge.  Will repeat TTE to evaluate LVEF.    Plan:  Responding to IV Lasix, reassess volume status in AM, daily weights.  Telemetry monitoring. [facility] - asked family to get his discharge papers.  Optimize GDMT, added Farxiga, continue Metoprolol, aldactone for now. Doubt any role for Midodrine, baseline BP's low but he appears adequately perfused at present. If BP's stabilize would add low dose Entresto as well.  Symptoms likely worsened by significant anemia, etiology? On Plavix as well as DOAC, this may be chronic. old records? Suggest basic evaluation including iron studies and GI evaluation.

## 2023-03-27 NOTE — PROGRESS NOTE ADULT - SUBJECTIVE AND OBJECTIVE BOX
Patient is a 82y old  Male who presents with a chief complaint of Acute on Chronic CHF (27 Mar 2023 15:01)    INTERVAL HPI/OVERNIGHT EVENTS: no acute events overnight  SUBJECTIVE: reports little difficulty breathing. feels a lot better     MEDICATIONS  (STANDING):  apixaban 2.5 milliGRAM(s) Oral two times a day  atorvastatin 80 milliGRAM(s) Oral at bedtime  budesonide 160 MICROgram(s)/formoterol 4.5 MICROgram(s) Inhaler 2 Puff(s) Inhalation two times a day  clopidogrel Tablet 75 milliGRAM(s) Oral daily  cyanocobalamin 1000 MICROGram(s) Oral daily  dextrose 5%. 1000 milliLiter(s) (50 mL/Hr) IV Continuous <Continuous>  dextrose 5%. 1000 milliLiter(s) (100 mL/Hr) IV Continuous <Continuous>  dextrose 50% Injectable 25 Gram(s) IV Push once  dextrose 50% Injectable 12.5 Gram(s) IV Push once  dextrose 50% Injectable 25 Gram(s) IV Push once  folic acid 1 milliGRAM(s) Oral daily  furosemide   Injectable 40 milliGRAM(s) IV Push two times a day  glucagon  Injectable 1 milliGRAM(s) IntraMuscular once  insulin lispro (ADMELOG) corrective regimen sliding scale   SubCutaneous three times a day before meals  insulin lispro (ADMELOG) corrective regimen sliding scale   SubCutaneous at bedtime  metoprolol succinate ER 25 milliGRAM(s) Oral daily  midodrine. 5 milliGRAM(s) Oral three times a day  mirtazapine 30 milliGRAM(s) Oral at bedtime  spironolactone 25 milliGRAM(s) Oral daily  tamsulosin 0.4 milliGRAM(s) Oral at bedtime  tiotropium 2.5 MICROgram(s) Inhaler 2 Puff(s) Inhalation daily    MEDICATIONS  (PRN):  acetaminophen     Tablet .. 650 milliGRAM(s) Oral every 6 hours PRN Temp greater or equal to 38C (100.4F), Mild Pain (1 - 3)  aluminum hydroxide/magnesium hydroxide/simethicone Suspension 30 milliLiter(s) Oral every 4 hours PRN Dyspepsia  dextrose Oral Gel 15 Gram(s) Oral once PRN Blood Glucose LESS THAN 70 milliGRAM(s)/deciliter  melatonin 3 milliGRAM(s) Oral at bedtime PRN Insomnia  ondansetron Injectable 4 milliGRAM(s) IV Push every 8 hours PRN Nausea and/or Vomiting  polyethylene glycol 3350 17 Gram(s) Oral daily PRN Constipation    Allergies    Hytrin (Unknown)  peanuts (Unknown)  rabeprazole (Unknown)    Intolerances      REVIEW OF SYSTEMS:  All other systems reviewed and are negative    Vital Signs Last 24 Hrs  T(C): 36.8 (27 Mar 2023 10:38), Max: 37 (26 Mar 2023 16:03)  T(F): 98.3 (27 Mar 2023 10:38), Max: 98.6 (26 Mar 2023 16:03)  HR: 86 (27 Mar 2023 10:38) (74 - 89)  BP: 96/63 (27 Mar 2023 10:38) (96/63 - 113/71)  BP(mean): --  RR: 18 (27 Mar 2023 10:38) (18 - 18)  SpO2: 90% (27 Mar 2023 10:38) (90% - 97%)    Parameters below as of 27 Mar 2023 10:38  Patient On (Oxygen Delivery Method): room air      Daily     Daily Weight in k.8 (27 Mar 2023 05:08)  I&O's Summary    26 Mar 2023 07:01  -  27 Mar 2023 07:00  --------------------------------------------------------  IN: 240 mL / OUT: 500 mL / NET: -260 mL    27 Mar 2023 07:01  -  27 Mar 2023 15:25  --------------------------------------------------------  IN: 540 mL / OUT: 0 mL / NET: 540 mL      CAPILLARY BLOOD GLUCOSE      POCT Blood Glucose.: 104 mg/dL (27 Mar 2023 11:04)  POCT Blood Glucose.: 99 mg/dL (27 Mar 2023 07:59)  POCT Blood Glucose.: 123 mg/dL (26 Mar 2023 22:02)  POCT Blood Glucose.: 116 mg/dL (26 Mar 2023 16:45)    PHYSICAL EXAM:  GENERAL: NAD, well-groomed, well-developed. pleasant  HEAD:  Atraumatic, Normocephalic  EYES: EOMI, PERRLA, conjunctiva and sclera clear  ENMT: No tonsillar erythema, exudates, or enlargement; Moist mucous membranes, Good dentition, No lesions  NECK: Supple, No JVD, Normal thyroid  NERVOUS SYSTEM:  Alert & Oriented X3, Good concentration; Motor Strength 5/5 B/L upper and lower extremities; DTRs 2+ intact and symmetric  CHEST/LUNG: Clear to percussion bilaterally; No rales, rhonchi, wheezing, or rubs  HEART: Regular rate and rhythm; No murmurs, rubs, or gallops  ABDOMEN: Soft, Nontender, Nondistended; Bowel sounds present  EXTREMITIES: 1+ bilateral lower extremity edema     Labs                          7.9    5.64  )-----------( 225      ( 27 Mar 2023 07:38 )             25.3     03    141  |  108  |  21  ----------------------------<  103<H>  4.1   |  29  |  1.33<H>    Ca    8.4<L>      27 Mar 2023 07:38  Mg     2.5

## 2023-03-27 NOTE — PHYSICAL THERAPY INITIAL EVALUATION ADULT - BALANCE TRAINING, PT EVAL
Pt will improve static & dynamic standing balance to Good using [Rolling walker] without c/o SOB/CISSE to perform ADL, Gait independently in 4 weeks

## 2023-03-27 NOTE — PHYSICAL THERAPY INITIAL EVALUATION ADULT - TRANSFER TRAINING, PT EVAL
Pt will be able to perform sit to stand, stand pivot transfer using [RW] without c/o SOB/CISSE independently in 4 to 3 days

## 2023-03-27 NOTE — PHYSICAL THERAPY INITIAL EVALUATION ADULT - PERTINENT HX OF CURRENT PROBLEM, REHAB EVAL
Generalised weakness, SOB/CISSE, CHF, COPD on 2  L/min continuous O2 in [facility]

## 2023-03-27 NOTE — PROGRESS NOTE ADULT - ASSESSMENT
81 y/o male w/ PMHx of CHF w/ ReF, COPD, Prostate Ca, DM type 2, CAD s/p MI Atrial Flutter on Eliquis, [facility], CHF, s/p RT for his Prostate as well, discharged on Life Vest, sent in from  rehab for SOB. Pt admitted for acute on chronic systolic CHF    CARDIOLOGY  # HF w/ ReF  # Acute on chronic systolic CHF  - start Lasix 40mg IV BID  - ECHO pending   - Strict I/Os, daily wts  - Fluid restriction  - toprol 25 mg po daily, spironolactone 25 mg po daily     # CAD s/p stents  - c/w plavix, statin    # Atrial Flutter  - c/w BB  - c/w Eliquis    HEME/ONC  # Anemia  - Pt w/ hx of Anemia, BL H/H unknown  - no reported BRBPR or melana  - c/w Folic acid, B12    ENDOCRINOLOGY  # DM type 2  - Pt not on any meds per [facility] med[facility]  - FS qAC and HS w/ SSI    PULMNOLOGY  # COPD  - c/w Inhalers    HEME/ONC  # Prostate Ca  - Pt reports he is s/p 28 day RT  - On Orgovux; nonformulary; pls have family bring in    PLAN  - c/w TELE  - consult CARDIOLOGY for evaluation of decompensation    # DVT ppx - c/w Eliquis    Pt FC

## 2023-03-27 NOTE — PHYSICAL THERAPY INITIAL EVALUATION ADULT - STRENGTHENING, PT EVAL
Pt will improve muscle strength in all extremities to 4/5 in 4 weeks to perform Gait & ADL independently  without c/o SOB/CISSE

## 2023-03-27 NOTE — PHYSICAL THERAPY INITIAL EVALUATION ADULT - ADDITIONAL COMMENTS
As per pt, he lives with his spouse in an apartment with no steps at all. he used to ambulate with rollator at all times independently until last month, however he received radiation therapy s/p prostate ca following which he declined in function, he came from [facility] in [facility] where for last 3 to 4 weeks he did not ambulate, he needs Ax2 for OOB transfers, sometimes they used vicki lift.

## 2023-03-28 LAB
ALBUMIN SERPL ELPH-MCNC: 2.2 G/DL — LOW (ref 3.3–5)
ALP SERPL-CCNC: 106 U/L — SIGNIFICANT CHANGE UP (ref 40–120)
ALT FLD-CCNC: 26 U/L — SIGNIFICANT CHANGE UP (ref 12–78)
ANION GAP SERPL CALC-SCNC: 4 MMOL/L — LOW (ref 5–17)
AST SERPL-CCNC: 18 U/L — SIGNIFICANT CHANGE UP (ref 15–37)
BASOPHILS # BLD AUTO: 0.01 K/UL — SIGNIFICANT CHANGE UP (ref 0–0.2)
BASOPHILS NFR BLD AUTO: 0.2 % — SIGNIFICANT CHANGE UP (ref 0–2)
BILIRUB SERPL-MCNC: 0.6 MG/DL — SIGNIFICANT CHANGE UP (ref 0.2–1.2)
BUN SERPL-MCNC: 19 MG/DL — SIGNIFICANT CHANGE UP (ref 7–23)
CALCIUM SERPL-MCNC: 8.5 MG/DL — SIGNIFICANT CHANGE UP (ref 8.5–10.1)
CHLORIDE SERPL-SCNC: 110 MMOL/L — HIGH (ref 96–108)
CO2 SERPL-SCNC: 27 MMOL/L — SIGNIFICANT CHANGE UP (ref 22–31)
CREAT SERPL-MCNC: 1.29 MG/DL — SIGNIFICANT CHANGE UP (ref 0.5–1.3)
EGFR: 56 ML/MIN/1.73M2 — LOW
EOSINOPHIL # BLD AUTO: 0.21 K/UL — SIGNIFICANT CHANGE UP (ref 0–0.5)
EOSINOPHIL NFR BLD AUTO: 3.5 % — SIGNIFICANT CHANGE UP (ref 0–6)
GLUCOSE BLDC GLUCOMTR-MCNC: 108 MG/DL — HIGH (ref 70–99)
GLUCOSE BLDC GLUCOMTR-MCNC: 117 MG/DL — HIGH (ref 70–99)
GLUCOSE BLDC GLUCOMTR-MCNC: 84 MG/DL — SIGNIFICANT CHANGE UP (ref 70–99)
GLUCOSE BLDC GLUCOMTR-MCNC: 92 MG/DL — SIGNIFICANT CHANGE UP (ref 70–99)
GLUCOSE SERPL-MCNC: 93 MG/DL — SIGNIFICANT CHANGE UP (ref 70–99)
HCT VFR BLD CALC: 26.7 % — LOW (ref 39–50)
HGB BLD-MCNC: 8.2 G/DL — LOW (ref 13–17)
IMM GRANULOCYTES NFR BLD AUTO: 0.5 % — SIGNIFICANT CHANGE UP (ref 0–0.9)
IRON SATN MFR SERPL: 22 % — SIGNIFICANT CHANGE UP (ref 16–55)
IRON SATN MFR SERPL: 44 UG/DL — LOW (ref 45–165)
LYMPHOCYTES # BLD AUTO: 0.32 K/UL — LOW (ref 1–3.3)
LYMPHOCYTES # BLD AUTO: 5.4 % — LOW (ref 13–44)
MAGNESIUM SERPL-MCNC: 2.4 MG/DL — SIGNIFICANT CHANGE UP (ref 1.6–2.6)
MCHC RBC-ENTMCNC: 30.7 G/DL — LOW (ref 32–36)
MCHC RBC-ENTMCNC: 32.7 PG — SIGNIFICANT CHANGE UP (ref 27–34)
MCV RBC AUTO: 106.4 FL — HIGH (ref 80–100)
MONOCYTES # BLD AUTO: 0.99 K/UL — HIGH (ref 0–0.9)
MONOCYTES NFR BLD AUTO: 16.7 % — HIGH (ref 2–14)
NEUTROPHILS # BLD AUTO: 4.38 K/UL — SIGNIFICANT CHANGE UP (ref 1.8–7.4)
NEUTROPHILS NFR BLD AUTO: 73.7 % — SIGNIFICANT CHANGE UP (ref 43–77)
NRBC # BLD: 0 /100 WBCS — SIGNIFICANT CHANGE UP (ref 0–0)
PHOSPHATE SERPL-MCNC: 3.3 MG/DL — SIGNIFICANT CHANGE UP (ref 2.5–4.5)
PLATELET # BLD AUTO: 212 K/UL — SIGNIFICANT CHANGE UP (ref 150–400)
POTASSIUM SERPL-MCNC: 3.9 MMOL/L — SIGNIFICANT CHANGE UP (ref 3.5–5.3)
POTASSIUM SERPL-SCNC: 3.9 MMOL/L — SIGNIFICANT CHANGE UP (ref 3.5–5.3)
PROT SERPL-MCNC: 6 GM/DL — SIGNIFICANT CHANGE UP (ref 6–8.3)
RBC # BLD: 2.51 M/UL — LOW (ref 4.2–5.8)
RBC # FLD: 16.7 % — HIGH (ref 10.3–14.5)
SODIUM SERPL-SCNC: 141 MMOL/L — SIGNIFICANT CHANGE UP (ref 135–145)
TIBC SERPL-MCNC: 199 UG/DL — LOW (ref 220–430)
TSH SERPL-MCNC: 4.26 UU/ML — HIGH (ref 0.36–3.74)
UIBC SERPL-MCNC: 155 UG/DL — SIGNIFICANT CHANGE UP (ref 110–370)
VIT B12 SERPL-MCNC: 1105 PG/ML — SIGNIFICANT CHANGE UP (ref 232–1245)
WBC # BLD: 5.94 K/UL — SIGNIFICANT CHANGE UP (ref 3.8–10.5)
WBC # FLD AUTO: 5.94 K/UL — SIGNIFICANT CHANGE UP (ref 3.8–10.5)

## 2023-03-28 PROCEDURE — 99232 SBSQ HOSP IP/OBS MODERATE 35: CPT

## 2023-03-28 PROCEDURE — 93306 TTE W/DOPPLER COMPLETE: CPT | Mod: 26

## 2023-03-28 PROCEDURE — 99233 SBSQ HOSP IP/OBS HIGH 50: CPT

## 2023-03-28 RX ORDER — ALBUTEROL 90 UG/1
2.5 AEROSOL, METERED ORAL ONCE
Refills: 0 | Status: DISCONTINUED | OUTPATIENT
Start: 2023-03-28 | End: 2023-04-06

## 2023-03-28 RX ORDER — BNT162B2 ORIGINAL AND OMICRON BA.4/BA.5 .1125; .1125 MG/2.25ML; MG/2.25ML
0.3 INJECTION, SUSPENSION INTRAMUSCULAR ONCE
Refills: 0 | Status: DISCONTINUED | OUTPATIENT
Start: 2023-03-28 | End: 2023-03-28

## 2023-03-28 RX ORDER — ALBUTEROL 90 UG/1
2.5 AEROSOL, METERED ORAL
Refills: 0 | Status: DISCONTINUED | OUTPATIENT
Start: 2023-03-28 | End: 2023-03-28

## 2023-03-28 RX ORDER — IPRATROPIUM/ALBUTEROL SULFATE 18-103MCG
3 AEROSOL WITH ADAPTER (GRAM) INHALATION EVERY 6 HOURS
Refills: 0 | Status: COMPLETED | OUTPATIENT
Start: 2023-03-28 | End: 2023-03-29

## 2023-03-28 RX ADMIN — MIDODRINE HYDROCHLORIDE 5 MILLIGRAM(S): 2.5 TABLET ORAL at 11:50

## 2023-03-28 RX ADMIN — TAMSULOSIN HYDROCHLORIDE 0.4 MILLIGRAM(S): 0.4 CAPSULE ORAL at 22:45

## 2023-03-28 RX ADMIN — ATORVASTATIN CALCIUM 80 MILLIGRAM(S): 80 TABLET, FILM COATED ORAL at 22:45

## 2023-03-28 RX ADMIN — Medication 1 MILLIGRAM(S): at 11:51

## 2023-03-28 RX ADMIN — Medication 40 MILLIGRAM(S): at 13:28

## 2023-03-28 RX ADMIN — Medication 40 MILLIGRAM(S): at 06:19

## 2023-03-28 RX ADMIN — APIXABAN 2.5 MILLIGRAM(S): 2.5 TABLET, FILM COATED ORAL at 06:19

## 2023-03-28 RX ADMIN — DAPAGLIFLOZIN 10 MILLIGRAM(S): 10 TABLET, FILM COATED ORAL at 00:15

## 2023-03-28 RX ADMIN — MIDODRINE HYDROCHLORIDE 5 MILLIGRAM(S): 2.5 TABLET ORAL at 06:20

## 2023-03-28 RX ADMIN — PREGABALIN 1000 MICROGRAM(S): 225 CAPSULE ORAL at 11:51

## 2023-03-28 RX ADMIN — Medication 3 MILLILITER(S): at 23:29

## 2023-03-28 RX ADMIN — MIDODRINE HYDROCHLORIDE 5 MILLIGRAM(S): 2.5 TABLET ORAL at 17:04

## 2023-03-28 RX ADMIN — BUDESONIDE AND FORMOTEROL FUMARATE DIHYDRATE 2 PUFF(S): 160; 4.5 AEROSOL RESPIRATORY (INHALATION) at 06:30

## 2023-03-28 RX ADMIN — MIRTAZAPINE 30 MILLIGRAM(S): 45 TABLET, ORALLY DISINTEGRATING ORAL at 22:47

## 2023-03-28 RX ADMIN — SPIRONOLACTONE 25 MILLIGRAM(S): 25 TABLET, FILM COATED ORAL at 06:20

## 2023-03-28 RX ADMIN — BUDESONIDE AND FORMOTEROL FUMARATE DIHYDRATE 2 PUFF(S): 160; 4.5 AEROSOL RESPIRATORY (INHALATION) at 17:05

## 2023-03-28 RX ADMIN — Medication 3 MILLILITER(S): at 18:11

## 2023-03-28 RX ADMIN — Medication 3 MILLILITER(S): at 16:25

## 2023-03-28 RX ADMIN — TIOTROPIUM BROMIDE 2 PUFF(S): 18 CAPSULE ORAL; RESPIRATORY (INHALATION) at 11:50

## 2023-03-28 RX ADMIN — APIXABAN 2.5 MILLIGRAM(S): 2.5 TABLET, FILM COATED ORAL at 17:04

## 2023-03-28 RX ADMIN — CLOPIDOGREL BISULFATE 75 MILLIGRAM(S): 75 TABLET, FILM COATED ORAL at 11:51

## 2023-03-28 RX ADMIN — Medication 25 MILLIGRAM(S): at 06:20

## 2023-03-28 NOTE — PROGRESS NOTE ADULT - ASSESSMENT
81 y/o male w/ PMHx of CHF w/ ReF, COPD, Prostate Ca, DM type 2, CAD s/p MI Atrial Flutter on Eliquis, [facility]F, s/p RT for his Prostate as well, discharged on Life Vest, sent in from  rehab for SOB. Pt admitted for acute on chronic systolic CHF    CARDIOLOGY  # HF w/ ReF  # Acute on chronic systolic CHF  - start Lasix 40mg IV BID  - ECHO pending   - Strict I/Os, daily wts  - Fluid restriction  - toprol 25 mg po daily, spironolactone 25 mg po daily     # CAD s/p stents  - c/w plavix, statin    # Atrial Flutter  - c/w BB  - c/w Eliquis    HEME/ONC  # Anemia  - Pt w/ hx of Anemia, BL H/H unknown  - no reported BRBPR or melana  - c/w Folic acid, B12    ENDOCRINOLOGY  # DM type 2  - Pt not on any meds per [facility]  - FS qAC and HS w/ SSI    PULMNOLOGY  # COPD  - c/w Inhalers    HEME/ONC  # Prostate Ca  - Pt reports he is s/p 28 day RT  - On Orgovux; nonformulary; pls have family bring in    PLAN  - c/w TELE  - consult CARDIOLOGY for evaluation of decompensation  - trend heme  - start albuterol for wheez     # DVT ppx - c/w Eliquis    Pt is full code    83 y/o male w/ PMHx of CHF w/ ReF, COPD, Prostate Ca, DM type 2, CAD s/p MI Atrial Flutter on Eliquis, [facility], CHF, s/p RT for his Prostate as well, discharged on Life Vest, sent in from  rehab for SOB. Pt admitted for acute on chronic systolic CHF    CARDIOLOGY  # HF w/ ReF  # Acute on chronic systolic CHF  - start Lasix 40mg IV BID  - ECHO pending   - Strict I/Os, daily wts  - Fluid restriction  - toprol 25 mg po daily, spironolactone 25 mg po daily     # CAD s/p stents  - c/w plavix, statin    # Atrial Flutter  - c/w BB  - c/w Eliquis    HEME/ONC  # Anemia  - Pt w/ hx of Anemia, BL H/H unknown  - no reported BRBPR or melana  - c/w Folic acid, B12    ENDOCRINOLOGY  # DM type 2  - Pt not on any meds per [facility]  - FS qAC and HS w/ SSI    PULMNOLOGY  # COPD  - c/w Inhalers    HEME/ONC  # Prostate Ca  - Pt reports he is s/p 28 day RT  - On Orgovux; nonformulary; pls have family bring in    PLAN  - c/w TELE  - consult CARDIOLOGY for evaluation of decompensation  - trend heme  - start albuterol for wheez     # DVT ppx - c/w Eliquis    Pt is full code      edit: spoke to pt's son who [facility] [place] [facility] Hartley. [facility] but unfortunately, i was told that there were no transfers after 4 pm. they requested that i call the [facility] in the am to faciliatate a transfer. i called pt's son and updated him

## 2023-03-28 NOTE — PROGRESS NOTE ADULT - SUBJECTIVE AND OBJECTIVE BOX
Patient is a 82y old  Male who presents with a chief complaint of sob    PAST MEDICAL & SURGICAL HISTORY:  CHF    Prostate cancer    afib    Type 2 diabetes mellitus    Chronic kidney disease (CKD)    EMANUEL on CPAP    HLD (hyperlipidemia)    COPD    HTN (hypertension)    INTERVAL HISTORY:  	  MEDICATIONS:  MEDICATIONS  (STANDING):  albuterol    0.083%. 2.5 milliGRAM(s) Nebulizer once  apixaban 2.5 milliGRAM(s) Oral two times a day  atorvastatin 80 milliGRAM(s) Oral at bedtime  budesonide 160 MICROgram(s)/formoterol 4.5 MICROgram(s) Inhaler 2 Puff(s) Inhalation two times a day  clopidogrel Tablet 75 milliGRAM(s) Oral daily  coronavirus bivalent (EUA) Booster Vaccine (PFIZER) 0.3 milliLiter(s) IntraMuscular once  cyanocobalamin 1000 MICROGram(s) Oral daily  dapagliflozin 10 milliGRAM(s) Oral every 24 hours  folic acid 1 milliGRAM(s) Oral daily  furosemide   Injectable 40 milliGRAM(s) IV Push two times a day  insulin lispro (ADMELOG) corrective regimen sliding scale   SubCutaneous three times a day before meals  insulin lispro (ADMELOG) corrective regimen sliding scale   SubCutaneous at bedtime  metoprolol succinate ER 25 milliGRAM(s) Oral daily  midodrine. 5 milliGRAM(s) Oral three times a day  mirtazapine 30 milliGRAM(s) Oral at bedtime  spironolactone 25 milliGRAM(s) Oral daily  tamsulosin 0.4 milliGRAM(s) Oral at bedtime  tiotropium 2.5 MICROgram(s) Inhaler 2 Puff(s) Inhalation daily    MEDICATIONS  (PRN):  acetaminophen     Tablet .. 650 milliGRAM(s) Oral every 6 hours PRN Temp greater or equal to 38C (100.4F), Mild Pain (1 - 3)  aluminum hydroxide/magnesium hydroxide/simethicone Suspension 30 milliLiter(s) Oral every 4 hours PRN Dyspepsia  dextrose Oral Gel 15 Gram(s) Oral once PRN Blood Glucose LESS THAN 70 milliGRAM(s)/deciliter  melatonin 3 milliGRAM(s) Oral at bedtime PRN Insomnia  ondansetron Injectable 4 milliGRAM(s) IV Push every 8 hours PRN Nausea and/or Vomiting  polyethylene glycol 3350 17 Gram(s) Oral daily PRN Constipation    Vitals:  T(F): 97.5 (03-28-23 @ 11:26), Max: 98 (03-28-23 @ 05:05)  HR: 79 (03-28-23 @ 11:26) (79 - 88)  BP: 94/62 (03-28-23 @ 11:26) (94/62 - 107/69)  RR: 18 (03-28-23 @ 11:26) (18 - 18)  SpO2: 92% (03-28-23 @ 11:26) (92% - 97%)    03-27 @ 07:01  -  03-28 @ 07:00  --------------------------------------------------------  IN:    Oral Fluid: 540 mL  Total IN: 540 mL    OUT:    Voided (mL): 500 mL  Total OUT: 500 mL    Total NET: 40 mL    03-28 @ 07:01  -  03-28 @ 13:50  --------------------------------------------------------  IN:    Oral Fluid: 360 mL  Total IN: 360 mL    OUT:  Total OUT: 0 mL    Total NET: 360 mL    Weight (kg): 101.3 (03-25 @ 22:03)  BMI (kg/m2): 33.1 (03-25 @ 22:03)    PHYSICAL EXAM:  Neuro: Awake, responsive  CV: S1 S2 RRR  Lungs: CTABL  GI: Soft, BS +, ND, NT  Extremities: No edema    TELEMETRY: sinus     RADIOLOGY: < from: Xray Chest 1 View AP/PA. (03.25.23 @ 12:53) >    Bibasilar patchy opacities likely atelectasis. Underlying infection is   not excluded.  Small bilateral pleural effusions    < end of copied text >    DIAGNOSTIC TESTING:    [p] Echocardiogram:     LABS:	 	    CARDIAC MARKERS:  Troponin I, High Sensitivity Result: 35.2 ng/L (03-25 @ 20:50)    28 Mar 2023 07:00    141    |  110    |  19     ----------------------------<  93     3.9     |  27     |  1.29   27 Mar 2023 07:38    141    |  108    |  21     ----------------------------<  103    4.1     |  29     |  1.33   26 Mar 2023 07:00    138    |  104    |  24     ----------------------------<  98     3.9     |  29     |  1.23     Ca    8.5        28 Mar 2023 07:00  Phos  3.3       28 Mar 2023 07:00  Mg     2.4       28 Mar 2023 07:00    TPro  6.0    /  Alb  2.2    /  TBili  0.6    /  DBili  x      /  AST  18     /  ALT  26     /  AlkPhos  106    28 Mar 2023 07:00                        8.2    5.94  )-----------( 212      ( 28 Mar 2023 07:00 )             26.7 ,                       7.9    5.64  )-----------( 225      ( 27 Mar 2023 07:38 )             25.3 ,                       8.0    8.00  )-----------( 225      ( 26 Mar 2023 07:00 )             25.7     TSH: Thyroid Stimulating Hormone, Serum: 4.260 uU/mL (03-28 @ 07:00)                 Patient is a 82y old  Male who presents with a chief complaint of sob    PAST MEDICAL & SURGICAL HISTORY:  CHF    Prostate cancer    afib    Type 2 diabetes mellitus    Chronic kidney disease (CKD)    EMANUEL on CPAP    HLD (hyperlipidemia)    COPD    HTN (hypertension)    INTERVAL HISTORY: feeling tired and c/o stomach upset /nausea   	  MEDICATIONS:  MEDICATIONS  (STANDING):  albuterol    0.083%. 2.5 milliGRAM(s) Nebulizer once  apixaban 2.5 milliGRAM(s) Oral two times a day  atorvastatin 80 milliGRAM(s) Oral at bedtime  budesonide 160 MICROgram(s)/formoterol 4.5 MICROgram(s) Inhaler 2 Puff(s) Inhalation two times a day  clopidogrel Tablet 75 milliGRAM(s) Oral daily  coronavirus bivalent (EUA) Booster Vaccine (PFIZER) 0.3 milliLiter(s) IntraMuscular once  cyanocobalamin 1000 MICROGram(s) Oral daily  dapagliflozin 10 milliGRAM(s) Oral every 24 hours  folic acid 1 milliGRAM(s) Oral daily  furosemide   Injectable 40 milliGRAM(s) IV Push two times a day  insulin lispro (ADMELOG) corrective regimen sliding scale   SubCutaneous three times a day before meals  insulin lispro (ADMELOG) corrective regimen sliding scale   SubCutaneous at bedtime  metoprolol succinate ER 25 milliGRAM(s) Oral daily  midodrine. 5 milliGRAM(s) Oral three times a day  mirtazapine 30 milliGRAM(s) Oral at bedtime  spironolactone 25 milliGRAM(s) Oral daily  tamsulosin 0.4 milliGRAM(s) Oral at bedtime  tiotropium 2.5 MICROgram(s) Inhaler 2 Puff(s) Inhalation daily    MEDICATIONS  (PRN):  acetaminophen     Tablet .. 650 milliGRAM(s) Oral every 6 hours PRN Temp greater or equal to 38C (100.4F), Mild Pain (1 - 3)  aluminum hydroxide/magnesium hydroxide/simethicone Suspension 30 milliLiter(s) Oral every 4 hours PRN Dyspepsia  dextrose Oral Gel 15 Gram(s) Oral once PRN Blood Glucose LESS THAN 70 milliGRAM(s)/deciliter  melatonin 3 milliGRAM(s) Oral at bedtime PRN Insomnia  ondansetron Injectable 4 milliGRAM(s) IV Push every 8 hours PRN Nausea and/or Vomiting  polyethylene glycol 3350 17 Gram(s) Oral daily PRN Constipation    Vitals:  T(F): 97.5 (03-28-23 @ 11:26), Max: 98 (03-28-23 @ 05:05)  HR: 79 (03-28-23 @ 11:26) (79 - 88)  BP: 94/62 (03-28-23 @ 11:26) (94/62 - 107/69)  RR: 18 (03-28-23 @ 11:26) (18 - 18)  SpO2: 92% (03-28-23 @ 11:26) (92% - 97%)    03-27 @ 07:01  -  03-28 @ 07:00  --------------------------------------------------------  IN:    Oral Fluid: 540 mL  Total IN: 540 mL    OUT:    Voided (mL): 500 mL  Total OUT: 500 mL    Total NET: 40 mL    03-28 @ 07:01  -  03-28 @ 13:50  --------------------------------------------------------  IN:    Oral Fluid: 360 mL  Total IN: 360 mL    OUT:  Total OUT: 0 mL    Total NET: 360 mL    Weight (kg): 101.3 (03-25 @ 22:03)  BMI (kg/m2): 33.1 (03-25 @ 22:03)    PHYSICAL EXAM:  Neuro: Awake, responsive  CV: S1 S2 RRR  Lungs: diminished to bases   GI: Soft, BS +, ND, NT  Extremities: trace LE edema    TELEMETRY: sinus     RADIOLOGY: < from: Xray Chest 1 View AP/PA. (03.25.23 @ 12:53) >    Bibasilar patchy opacities likely atelectasis. Underlying infection is   not excluded.  Small bilateral pleural effusions    < end of copied text >    DIAGNOSTIC TESTING:    [p] Echocardiogram:     LABS:	 	    CARDIAC MARKERS:  Troponin I, High Sensitivity Result: 35.2 ng/L (03-25 @ 20:50)    28 Mar 2023 07:00    141    |  110    |  19     ----------------------------<  93     3.9     |  27     |  1.29   27 Mar 2023 07:38    141    |  108    |  21     ----------------------------<  103    4.1     |  29     |  1.33   26 Mar 2023 07:00    138    |  104    |  24     ----------------------------<  98     3.9     |  29     |  1.23     Ca    8.5        28 Mar 2023 07:00  Phos  3.3       28 Mar 2023 07:00  Mg     2.4       28 Mar 2023 07:00    TPro  6.0    /  Alb  2.2    /  TBili  0.6    /  DBili  x      /  AST  18     /  ALT  26     /  AlkPhos  106    28 Mar 2023 07:00                        8.2    5.94  )-----------( 212      ( 28 Mar 2023 07:00 )             26.7 ,                       7.9    5.64  )-----------( 225      ( 27 Mar 2023 07:38 )             25.3 ,                       8.0    8.00  )-----------( 225      ( 26 Mar 2023 07:00 )             25.7     TSH: Thyroid Stimulating Hormone, Serum: 4.260 uU/mL (03-28 @ 07:00)

## 2023-03-28 NOTE — PROGRESS NOTE ADULT - ASSESSMENT
81 yo male with likely history of ischemic cardiomyopathy. Also chr AF, DM2, COPD.  [facility], [facility].  Here for acute dyspnea x one day and productive cough, likely sec to exacerbation of CHF.  No evidence of acute ischemia, but cath is reportedly pending at [facility].  Is on life vest; this should be removed while on monitor in hospital, patient needs education regarding its use prior to discharge.  Will repeat TTE to evaluate LVEF.    Plan:  Responding to IV Lasix, reassess volume status in AM, daily weights.  Telemetry monitoring. [facility] at [facility] - asked family to get his discharge papers.  Optimize GDMT, added Farxiga, continue Metoprolol, aldactone for now. Doubt any role for Midodrine, baseline BP's low but he appears adequately perfused at present. If BP's stabilize would add low dose Entresto as well.  Symptoms likely worsened by significant anemia, etiology? On Plavix as well as DOAC, this may be chronic. old records? Suggest basic evaluation including iron studies and GI evaluation. 81 yo male with likely history of ischemic cardiomyopathy. Also chr AF, DM2, COPD.  [facility], pneumonia, FTT.  Here for acute dyspnea x one day and productive cough, likely sec to exacerbation of CHF.  No evidence of acute ischemia, but cath is reportedly pending at [facility].  Is on life vest; not wearing at present, patient needs education regarding its use prior to discharge, will reach out to [employer]  Will repeat TTE to evaluate LVEF.    Plan:  Responding to IV Lasix bid, can be decreased to once a day dosing   monitor renal function, electrolytes and daily weights.  Telemetry monitoring   Optimize GDMT, continue Metoprolol, Farxiga, aldactone for now. Doubt any role for Midodrine, baseline BP's low but he appears adequately perfused at present. If BP's stabilize would add low dose Entresto as well.  Symptoms likely worsened by significant anemia, etiology? On Plavix as well as DOAC, this may be chronic. old records? anemia w/u as per primary team

## 2023-03-28 NOTE — PROGRESS NOTE ADULT - SUBJECTIVE AND OBJECTIVE BOX
Patient is a 82y old  Male who presents with a chief complaint of Acute on Chronic CHF (27 Mar 2023 15:25)    INTERVAL HPI/OVERNIGHT EVENTS: no overnight events  SUBJECTIVE: no fever/chills. reports breathing mildly improved    MEDICATIONS  (STANDING):  albuterol    0.083%. 2.5 milliGRAM(s) Nebulizer once  apixaban 2.5 milliGRAM(s) Oral two times a day  atorvastatin 80 milliGRAM(s) Oral at bedtime  budesonide 160 MICROgram(s)/formoterol 4.5 MICROgram(s) Inhaler 2 Puff(s) Inhalation two times a day  clopidogrel Tablet 75 milliGRAM(s) Oral daily  cyanocobalamin 1000 MICROGram(s) Oral daily  dapagliflozin 10 milliGRAM(s) Oral every 24 hours  dextrose 5%. 1000 milliLiter(s) (100 mL/Hr) IV Continuous <Continuous>  dextrose 5%. 1000 milliLiter(s) (50 mL/Hr) IV Continuous <Continuous>  dextrose 50% Injectable 25 Gram(s) IV Push once  dextrose 50% Injectable 12.5 Gram(s) IV Push once  dextrose 50% Injectable 25 Gram(s) IV Push once  folic acid 1 milliGRAM(s) Oral daily  furosemide   Injectable 40 milliGRAM(s) IV Push two times a day  glucagon  Injectable 1 milliGRAM(s) IntraMuscular once  insulin lispro (ADMELOG) corrective regimen sliding scale   SubCutaneous three times a day before meals  insulin lispro (ADMELOG) corrective regimen sliding scale   SubCutaneous at bedtime  metoprolol succinate ER 25 milliGRAM(s) Oral daily  midodrine. 5 milliGRAM(s) Oral three times a day  mirtazapine 30 milliGRAM(s) Oral at bedtime  spironolactone 25 milliGRAM(s) Oral daily  tamsulosin 0.4 milliGRAM(s) Oral at bedtime  tiotropium 2.5 MICROgram(s) Inhaler 2 Puff(s) Inhalation daily    MEDICATIONS  (PRN):  acetaminophen     Tablet .. 650 milliGRAM(s) Oral every 6 hours PRN Temp greater or equal to 38C (100.4F), Mild Pain (1 - 3)  aluminum hydroxide/magnesium hydroxide/simethicone Suspension 30 milliLiter(s) Oral every 4 hours PRN Dyspepsia  dextrose Oral Gel 15 Gram(s) Oral once PRN Blood Glucose LESS THAN 70 milliGRAM(s)/deciliter  melatonin 3 milliGRAM(s) Oral at bedtime PRN Insomnia  ondansetron Injectable 4 milliGRAM(s) IV Push every 8 hours PRN Nausea and/or Vomiting  polyethylene glycol 3350 17 Gram(s) Oral daily PRN Constipation    Allergies    Hytrin (Unknown)  peanuts (Unknown)  rabeprazole (Unknown)    Intolerances      REVIEW OF SYSTEMS:  All other systems reviewed and are negative    Vital Signs Last 24 Hrs  T(C): 36.4 (28 Mar 2023 11:26), Max: 36.7 (28 Mar 2023 05:05)  T(F): 97.5 (28 Mar 2023 11:26), Max: 98 (28 Mar 2023 05:05)  HR: 79 (28 Mar 2023 11:) (79 - 88)  BP: 94/62 (28 Mar 2023 11:26) (94/62 - 107/69)  BP(mean): --  RR: 18 (28 Mar 2023 11:26) (18 - 18)  SpO2: 92% (28 Mar 2023 11:26) (92% - 97%)    Parameters below as of 28 Mar 2023 11:26  Patient On (Oxygen Delivery Method): room air      Daily     Daily Weight in k.7 (28 Mar 2023 06:47)  I&O's Summary    27 Mar 2023 07:01  -  28 Mar 2023 07:00  --------------------------------------------------------  IN: 540 mL / OUT: 500 mL / NET: 40 mL      CAPILLARY BLOOD GLUCOSE      POCT Blood Glucose.: 92 mg/dL (28 Mar 2023 11:28)  POCT Blood Glucose.: 108 mg/dL (28 Mar 2023 07:46)  POCT Blood Glucose.: 90 mg/dL (27 Mar 2023 21:17)  POCT Blood Glucose.: 116 mg/dL (27 Mar 2023 17:13)    PHYSICAL EXAM:  GENERAL: NAD, well-groomed, well-developed  HEAD:  Atraumatic, Normocephalic  EYES: EOMI, PERRLA, conjunctiva and sclera clear  ENMT: No tonsillar erythema, exudates, or enlargement; Moist mucous membranes, Good dentition, No lesions  NECK: Supple, No JVD, Normal thyroid  NERVOUS SYSTEM:  Alert & Oriented X3, Good concentration;  CHEST/LUNG: mild crackles and wheezing  HEART: Regular rate and rhythm; No murmurs, rubs, or gallops  ABDOMEN: Soft, Nontender, Nondistended; Bowel sounds present  EXTREMITIES: pitting edema    Labs                          8.2    5.94  )-----------( 212      ( 28 Mar 2023 07:00 )             26.7     -    141  |  110<H>  |  19  ----------------------------<  93  3.9   |  27  |  1.29    Ca    8.5      28 Mar 2023 07:00  Phos  3.3       Mg     2.4         TPro  6.0  /  Alb  2.2<L>  /  TBili  0.6  /  DBili  x   /  AST  18  /  ALT  26  /  AlkPhos  106

## 2023-03-29 LAB
ALBUMIN SERPL ELPH-MCNC: 2.3 G/DL — LOW (ref 3.3–5)
ALBUMIN SERPL ELPH-MCNC: 2.5 G/DL — LOW (ref 3.3–5)
ALP SERPL-CCNC: 113 U/L — SIGNIFICANT CHANGE UP (ref 40–120)
ALP SERPL-CCNC: 115 U/L — SIGNIFICANT CHANGE UP (ref 40–120)
ALT FLD-CCNC: 23 U/L — SIGNIFICANT CHANGE UP (ref 12–78)
ALT FLD-CCNC: 24 U/L — SIGNIFICANT CHANGE UP (ref 12–78)
ANION GAP SERPL CALC-SCNC: 7 MMOL/L — SIGNIFICANT CHANGE UP (ref 5–17)
ANION GAP SERPL CALC-SCNC: 7 MMOL/L — SIGNIFICANT CHANGE UP (ref 5–17)
ANISOCYTOSIS BLD QL: SLIGHT — SIGNIFICANT CHANGE UP
AST SERPL-CCNC: 14 U/L — LOW (ref 15–37)
AST SERPL-CCNC: 16 U/L — SIGNIFICANT CHANGE UP (ref 15–37)
BASOPHILS # BLD AUTO: 0 K/UL — SIGNIFICANT CHANGE UP (ref 0–0.2)
BASOPHILS NFR BLD AUTO: 0 % — SIGNIFICANT CHANGE UP (ref 0–2)
BILIRUB SERPL-MCNC: 0.6 MG/DL — SIGNIFICANT CHANGE UP (ref 0.2–1.2)
BILIRUB SERPL-MCNC: 0.7 MG/DL — SIGNIFICANT CHANGE UP (ref 0.2–1.2)
BUN SERPL-MCNC: 21 MG/DL — SIGNIFICANT CHANGE UP (ref 7–23)
BUN SERPL-MCNC: 21 MG/DL — SIGNIFICANT CHANGE UP (ref 7–23)
CALCIUM SERPL-MCNC: 8.5 MG/DL — SIGNIFICANT CHANGE UP (ref 8.5–10.1)
CALCIUM SERPL-MCNC: 8.7 MG/DL — SIGNIFICANT CHANGE UP (ref 8.5–10.1)
CHLORIDE SERPL-SCNC: 102 MMOL/L — SIGNIFICANT CHANGE UP (ref 96–108)
CHLORIDE SERPL-SCNC: 106 MMOL/L — SIGNIFICANT CHANGE UP (ref 96–108)
CO2 SERPL-SCNC: 25 MMOL/L — SIGNIFICANT CHANGE UP (ref 22–31)
CO2 SERPL-SCNC: 27 MMOL/L — SIGNIFICANT CHANGE UP (ref 22–31)
CREAT SERPL-MCNC: 1.45 MG/DL — HIGH (ref 0.5–1.3)
CREAT SERPL-MCNC: 1.7 MG/DL — HIGH (ref 0.5–1.3)
EGFR: 40 ML/MIN/1.73M2 — LOW
EGFR: 49 ML/MIN/1.73M2 — LOW
EOSINOPHIL # BLD AUTO: 0 K/UL — SIGNIFICANT CHANGE UP (ref 0–0.5)
EOSINOPHIL NFR BLD AUTO: 0 % — SIGNIFICANT CHANGE UP (ref 0–6)
GLUCOSE BLDC GLUCOMTR-MCNC: 106 MG/DL — HIGH (ref 70–99)
GLUCOSE BLDC GLUCOMTR-MCNC: 113 MG/DL — HIGH (ref 70–99)
GLUCOSE BLDC GLUCOMTR-MCNC: 135 MG/DL — HIGH (ref 70–99)
GLUCOSE BLDC GLUCOMTR-MCNC: 169 MG/DL — HIGH (ref 70–99)
GLUCOSE SERPL-MCNC: 111 MG/DL — HIGH (ref 70–99)
GLUCOSE SERPL-MCNC: 93 MG/DL — SIGNIFICANT CHANGE UP (ref 70–99)
HCT VFR BLD CALC: 26.2 % — LOW (ref 39–50)
HCT VFR BLD CALC: 27.4 % — LOW (ref 39–50)
HGB BLD-MCNC: 8.4 G/DL — LOW (ref 13–17)
HGB BLD-MCNC: 8.4 G/DL — LOW (ref 13–17)
HYPOCHROMIA BLD QL: SLIGHT — SIGNIFICANT CHANGE UP
LACTATE SERPL-SCNC: 1.1 MMOL/L — SIGNIFICANT CHANGE UP (ref 0.7–2)
LG PLATELETS BLD QL AUTO: SLIGHT — SIGNIFICANT CHANGE UP
LYMPHOCYTES # BLD AUTO: 0.25 K/UL — LOW (ref 1–3.3)
LYMPHOCYTES # BLD AUTO: 2 % — LOW (ref 13–44)
MAGNESIUM SERPL-MCNC: 2.2 MG/DL — SIGNIFICANT CHANGE UP (ref 1.6–2.6)
MANUAL SMEAR VERIFICATION: SIGNIFICANT CHANGE UP
MCHC RBC-ENTMCNC: 30.7 G/DL — LOW (ref 32–36)
MCHC RBC-ENTMCNC: 32.1 G/DL — SIGNIFICANT CHANGE UP (ref 32–36)
MCHC RBC-ENTMCNC: 32.3 PG — SIGNIFICANT CHANGE UP (ref 27–34)
MCHC RBC-ENTMCNC: 33.1 PG — SIGNIFICANT CHANGE UP (ref 27–34)
MCV RBC AUTO: 103.1 FL — HIGH (ref 80–100)
MCV RBC AUTO: 105.4 FL — HIGH (ref 80–100)
MONOCYTES # BLD AUTO: 0.89 K/UL — SIGNIFICANT CHANGE UP (ref 0–0.9)
MONOCYTES NFR BLD AUTO: 7 % — SIGNIFICANT CHANGE UP (ref 2–14)
NEUTROPHILS # BLD AUTO: 11.52 K/UL — HIGH (ref 1.8–7.4)
NEUTROPHILS NFR BLD AUTO: 91 % — HIGH (ref 43–77)
NRBC # BLD: 0 /100 WBCS — SIGNIFICANT CHANGE UP (ref 0–0)
NRBC # BLD: 0 /100 — SIGNIFICANT CHANGE UP (ref 0–0)
NRBC # BLD: SIGNIFICANT CHANGE UP /100 WBCS (ref 0–0)
PHOSPHATE SERPL-MCNC: 3.7 MG/DL — SIGNIFICANT CHANGE UP (ref 2.5–4.5)
PLAT MORPH BLD: ABNORMAL
PLATELET # BLD AUTO: 241 K/UL — SIGNIFICANT CHANGE UP (ref 150–400)
PLATELET # BLD AUTO: 246 K/UL — SIGNIFICANT CHANGE UP (ref 150–400)
POTASSIUM SERPL-MCNC: 3.6 MMOL/L — SIGNIFICANT CHANGE UP (ref 3.5–5.3)
POTASSIUM SERPL-MCNC: 3.7 MMOL/L — SIGNIFICANT CHANGE UP (ref 3.5–5.3)
POTASSIUM SERPL-SCNC: 3.6 MMOL/L — SIGNIFICANT CHANGE UP (ref 3.5–5.3)
POTASSIUM SERPL-SCNC: 3.7 MMOL/L — SIGNIFICANT CHANGE UP (ref 3.5–5.3)
PROT SERPL-MCNC: 6.5 GM/DL — SIGNIFICANT CHANGE UP (ref 6–8.3)
PROT SERPL-MCNC: 6.7 GM/DL — SIGNIFICANT CHANGE UP (ref 6–8.3)
RBC # BLD: 2.54 M/UL — LOW (ref 4.2–5.8)
RBC # BLD: 2.6 M/UL — LOW (ref 4.2–5.8)
RBC # FLD: 16.7 % — HIGH (ref 10.3–14.5)
RBC # FLD: 17 % — HIGH (ref 10.3–14.5)
RBC BLD AUTO: ABNORMAL
SODIUM SERPL-SCNC: 136 MMOL/L — SIGNIFICANT CHANGE UP (ref 135–145)
SODIUM SERPL-SCNC: 138 MMOL/L — SIGNIFICANT CHANGE UP (ref 135–145)
WBC # BLD: 12.66 K/UL — HIGH (ref 3.8–10.5)
WBC # BLD: 6.91 K/UL — SIGNIFICANT CHANGE UP (ref 3.8–10.5)
WBC # FLD AUTO: 12.66 K/UL — HIGH (ref 3.8–10.5)
WBC # FLD AUTO: 6.91 K/UL — SIGNIFICANT CHANGE UP (ref 3.8–10.5)

## 2023-03-29 PROCEDURE — 99232 SBSQ HOSP IP/OBS MODERATE 35: CPT

## 2023-03-29 PROCEDURE — 43762 RPLC GTUBE NO REVJ TRC: CPT | Mod: 1L,AS

## 2023-03-29 PROCEDURE — 99233 SBSQ HOSP IP/OBS HIGH 50: CPT

## 2023-03-29 PROCEDURE — 99221 1ST HOSP IP/OBS SF/LOW 40: CPT | Mod: 1L

## 2023-03-29 PROCEDURE — 99223 1ST HOSP IP/OBS HIGH 75: CPT

## 2023-03-29 RX ORDER — IPRATROPIUM/ALBUTEROL SULFATE 18-103MCG
3 AEROSOL WITH ADAPTER (GRAM) INHALATION EVERY 6 HOURS
Refills: 0 | Status: DISCONTINUED | OUTPATIENT
Start: 2023-03-29 | End: 2023-04-06

## 2023-03-29 RX ORDER — VANCOMYCIN HCL 1 G
1000 VIAL (EA) INTRAVENOUS ONCE
Refills: 0 | Status: COMPLETED | OUTPATIENT
Start: 2023-03-29 | End: 2023-03-29

## 2023-03-29 RX ORDER — CEFEPIME 1 G/1
1000 INJECTION, POWDER, FOR SOLUTION INTRAMUSCULAR; INTRAVENOUS ONCE
Refills: 0 | Status: COMPLETED | OUTPATIENT
Start: 2023-03-29 | End: 2023-03-29

## 2023-03-29 RX ORDER — VANCOMYCIN HCL 1 G
VIAL (EA) INTRAVENOUS
Refills: 0 | Status: DISCONTINUED | OUTPATIENT
Start: 2023-03-29 | End: 2023-04-01

## 2023-03-29 RX ORDER — CEFEPIME 1 G/1
INJECTION, POWDER, FOR SOLUTION INTRAMUSCULAR; INTRAVENOUS
Refills: 0 | Status: COMPLETED | OUTPATIENT
Start: 2023-03-29 | End: 2023-04-04

## 2023-03-29 RX ORDER — VANCOMYCIN HCL 1 G
1000 VIAL (EA) INTRAVENOUS EVERY 24 HOURS
Refills: 0 | Status: DISCONTINUED | OUTPATIENT
Start: 2023-03-30 | End: 2023-04-01

## 2023-03-29 RX ORDER — PIPERACILLIN AND TAZOBACTAM 4; .5 G/20ML; G/20ML
3.38 INJECTION, POWDER, LYOPHILIZED, FOR SOLUTION INTRAVENOUS ONCE
Refills: 0 | Status: DISCONTINUED | OUTPATIENT
Start: 2023-03-29 | End: 2023-03-29

## 2023-03-29 RX ORDER — CEFEPIME 1 G/1
1000 INJECTION, POWDER, FOR SOLUTION INTRAMUSCULAR; INTRAVENOUS EVERY 8 HOURS
Refills: 0 | Status: COMPLETED | OUTPATIENT
Start: 2023-03-29 | End: 2023-04-04

## 2023-03-29 RX ORDER — PIPERACILLIN AND TAZOBACTAM 4; .5 G/20ML; G/20ML
3.38 INJECTION, POWDER, LYOPHILIZED, FOR SOLUTION INTRAVENOUS ONCE
Refills: 0 | Status: COMPLETED | OUTPATIENT
Start: 2023-03-29 | End: 2023-03-29

## 2023-03-29 RX ADMIN — MIDODRINE HYDROCHLORIDE 5 MILLIGRAM(S): 2.5 TABLET ORAL at 06:09

## 2023-03-29 RX ADMIN — DAPAGLIFLOZIN 10 MILLIGRAM(S): 10 TABLET, FILM COATED ORAL at 01:48

## 2023-03-29 RX ADMIN — ATORVASTATIN CALCIUM 80 MILLIGRAM(S): 80 TABLET, FILM COATED ORAL at 22:42

## 2023-03-29 RX ADMIN — Medication 650 MILLIGRAM(S): at 14:05

## 2023-03-29 RX ADMIN — BUDESONIDE AND FORMOTEROL FUMARATE DIHYDRATE 2 PUFF(S): 160; 4.5 AEROSOL RESPIRATORY (INHALATION) at 06:18

## 2023-03-29 RX ADMIN — PIPERACILLIN AND TAZOBACTAM 200 GRAM(S): 4; .5 INJECTION, POWDER, LYOPHILIZED, FOR SOLUTION INTRAVENOUS at 13:54

## 2023-03-29 RX ADMIN — MIDODRINE HYDROCHLORIDE 5 MILLIGRAM(S): 2.5 TABLET ORAL at 11:56

## 2023-03-29 RX ADMIN — APIXABAN 2.5 MILLIGRAM(S): 2.5 TABLET, FILM COATED ORAL at 06:11

## 2023-03-29 RX ADMIN — Medication 1000 MILLIGRAM(S): at 16:42

## 2023-03-29 RX ADMIN — TAMSULOSIN HYDROCHLORIDE 0.4 MILLIGRAM(S): 0.4 CAPSULE ORAL at 22:42

## 2023-03-29 RX ADMIN — Medication 40 MILLIGRAM(S): at 06:11

## 2023-03-29 RX ADMIN — MIDODRINE HYDROCHLORIDE 5 MILLIGRAM(S): 2.5 TABLET ORAL at 18:36

## 2023-03-29 RX ADMIN — PREGABALIN 1000 MICROGRAM(S): 225 CAPSULE ORAL at 11:55

## 2023-03-29 RX ADMIN — CEFEPIME 100 MILLIGRAM(S): 1 INJECTION, POWDER, FOR SOLUTION INTRAMUSCULAR; INTRAVENOUS at 18:33

## 2023-03-29 RX ADMIN — BUDESONIDE AND FORMOTEROL FUMARATE DIHYDRATE 2 PUFF(S): 160; 4.5 AEROSOL RESPIRATORY (INHALATION) at 18:38

## 2023-03-29 RX ADMIN — TIOTROPIUM BROMIDE 2 PUFF(S): 18 CAPSULE ORAL; RESPIRATORY (INHALATION) at 11:57

## 2023-03-29 RX ADMIN — Medication 1 MILLIGRAM(S): at 11:56

## 2023-03-29 RX ADMIN — MIRTAZAPINE 30 MILLIGRAM(S): 45 TABLET, ORALLY DISINTEGRATING ORAL at 22:43

## 2023-03-29 RX ADMIN — Medication 650 MILLIGRAM(S): at 13:59

## 2023-03-29 RX ADMIN — APIXABAN 2.5 MILLIGRAM(S): 2.5 TABLET, FILM COATED ORAL at 18:35

## 2023-03-29 RX ADMIN — Medication 3 MILLILITER(S): at 05:34

## 2023-03-29 RX ADMIN — CLOPIDOGREL BISULFATE 75 MILLIGRAM(S): 75 TABLET, FILM COATED ORAL at 11:55

## 2023-03-29 NOTE — DIETITIAN INITIAL EVALUATION ADULT - HEIGHT FOR BMI (CENTIMETERS)
[facility]  [address] [address]  Phone: [phone]             June 29, 2021    Patient: Saeid Hernandez   YOB: 1951   Date of Visit: 6/26/2021       To Whom It May Concern:    Nick Baumann was seen and treated in our facility  beginning 6/26/2021 until 6/29/2021. She may return to work on 7/5/2021.       Sincerely,       Keyla Crowder RN         Signature:__________________________________ 175.26

## 2023-03-29 NOTE — DIETITIAN INITIAL EVALUATION ADULT - ENERGY INTAKE
Per flow sheets pt consuming 51-75% of documented meals. PCA reports pt did not consume breakfast this morning- pt reports he did not feel well enough to eat this morning (03/29). Pt reports however his appetite is "getting better".

## 2023-03-29 NOTE — CONSULT NOTE ADULT - ASSESSMENT
83 y/o M w/ PMHx of CHF, COPD, Prostate Ca, DM type 2, Atrial Flutter on Eliquis, EMANUEL, recently admitted at the [facility] for 4 weeks for PNA, CHF, s/p RT for his Prostate as well, discharged on Life Vest, sent in from  rehab for SOB.   Febrile with leukocytosis.   CXR from 3/25 (I personally reviewed) with bilateral pleural effusions   Now has IRMA with creatinine rising from 1.2->1.7  Exam with crackles on lung exam as well as purulence and tenderness at the peg site   possible sources of fever are pneumonia vs peg site infection vs viral     Plan:  Blood cultures x2 sets  UA with reflex to urine culture if positive   RVP  stop Zosyn   start vancomycin 15mg/kg   send vancomycin trough with target AUC/CHEO 400-600   (therapeutic drug monitoring required with IV vancomycin)   start cefepime based on renal dosing algorithm   Surgical Consult as new PEG   agree with CT c/a/p but cannot get contrast due to IRMA   lactate   monitor saturation, chest PT   physical therapy  for Diabetes mellitus ISS as he has a good HbA1c and glucose levels have been appropriate here->avoid any hypoglycemic episodes     Discussed with Dr. Barahona and medicine NP    Brennen Greer, DO  Infectious Disease Attending  Reachable via Microsoft Teams or [place] office: [phone]  After 5pm/weekends please call [phone] for all inquiries and new consults

## 2023-03-29 NOTE — CONSULT NOTE ADULT - SUBJECTIVE AND OBJECTIVE BOX
Chief Complaint:  Patient is a 82y old  Male who presents with a chief complaint of Acute on Chronic CHF (29 Mar 2023 18:27)      HPI:        83 y/o M w/ PMHx of CHF, COPD, Prostate Ca, DM type 2, Atrial Flutter on Eliquis, EMANUEL, recently admitted at the [facility] [place] for 4 weeks for PNA, CHF, s/p RT for his Prostate as well, discharged on Life Vest, sent in from  rehab for SOB. Pt reports SOB began the day he was discharged and has been getting worse. Pt also reports nonproductive wet cough related to PNA which he was treated for with IV abx. Pt reports mild L sided chest tightness, denies palpitations or dizziness. Pt reports a PEG tube was placed as he was not eating, but pt reports his appetite is back and he is eating.     Pt initially placed on Bi-PAP on arrival in ED, now SPO2 98% on 4L NC (25 Mar 2023 20:08)      PMH/PSH:PAST MEDICAL & SURGICAL HISTORY:  Chronic CHF      Prostate cancer      Type 2 diabetes mellitus      Chronic kidney disease (CKD)      EMANUEL on CPAP      HLD (hyperlipidemia)      History of COPD      HTN (hypertension)      No significant past surgical history          Allergies:  Hytrin (Unknown)  peanuts (Unknown)  rabeprazole (Unknown)  yellow dye (Hives)      Medications:  acetaminophen     Tablet .. 650 milliGRAM(s) Oral every 6 hours PRN  albuterol    0.083%. 2.5 milliGRAM(s) Nebulizer once  albuterol/ipratropium for Nebulization 3 milliLiter(s) Nebulizer every 6 hours  aluminum hydroxide/magnesium hydroxide/simethicone Suspension 30 milliLiter(s) Oral every 4 hours PRN  apixaban 2.5 milliGRAM(s) Oral two times a day  atorvastatin 80 milliGRAM(s) Oral at bedtime  budesonide 160 MICROgram(s)/formoterol 4.5 MICROgram(s) Inhaler 2 Puff(s) Inhalation two times a day  cefepime   IVPB      cefepime   IVPB 1000 milliGRAM(s) IV Intermittent every 8 hours  clopidogrel Tablet 75 milliGRAM(s) Oral daily  cyanocobalamin 1000 MICROGram(s) Oral daily  dapagliflozin 10 milliGRAM(s) Oral every 24 hours  dextrose 5%. 1000 milliLiter(s) IV Continuous <Continuous>  dextrose 5%. 1000 milliLiter(s) IV Continuous <Continuous>  dextrose 50% Injectable 25 Gram(s) IV Push once  dextrose 50% Injectable 12.5 Gram(s) IV Push once  dextrose 50% Injectable 25 Gram(s) IV Push once  dextrose Oral Gel 15 Gram(s) Oral once PRN  folic acid 1 milliGRAM(s) Oral daily  furosemide   Injectable 40 milliGRAM(s) IV Push two times a day  glucagon  Injectable 1 milliGRAM(s) IntraMuscular once  insulin lispro (ADMELOG) corrective regimen sliding scale   SubCutaneous three times a day before meals  insulin lispro (ADMELOG) corrective regimen sliding scale   SubCutaneous at bedtime  melatonin 3 milliGRAM(s) Oral at bedtime PRN  metoprolol succinate ER 25 milliGRAM(s) Oral daily  midodrine. 5 milliGRAM(s) Oral three times a day  mirtazapine 30 milliGRAM(s) Oral at bedtime  ondansetron Injectable 4 milliGRAM(s) IV Push every 8 hours PRN  polyethylene glycol 3350 17 Gram(s) Oral daily PRN  spironolactone 25 milliGRAM(s) Oral daily  tamsulosin 0.4 milliGRAM(s) Oral at bedtime  tiotropium 2.5 MICROgram(s) Inhaler 2 Puff(s) Inhalation daily  vancomycin  IVPB          Review of Systems:  Negative except for HPI    Relevant Family History:   FAMILY HISTORY:  No pertinent family history in first degree relatives        Relevant Social History: Alcohol ( -) , Tobacco ( -) , Illicit drugs (- )     Physical Exam:    Vital Signs:  Vital Signs Last 24 Hrs  T(C): 36.4 (29 Mar 2023 15:51), Max: 39.1 (29 Mar 2023 12:45)  T(F): 97.6 (29 Mar 2023 15:51), Max: 102.4 (29 Mar 2023 12:45)  HR: 92 (29 Mar 2023 17:56) (68 - 96)  BP: 103/65 (29 Mar 2023 15:51) (91/56 - 103/65)  BP(mean): --  RR: 20 (29 Mar 2023 15:51) (18 - 20)  SpO2: 95% (29 Mar 2023 17:56) (90% - 99%)    Parameters below as of 29 Mar 2023 15:51  Patient On (Oxygen Delivery Method): nasal cannula  O2 Flow (L/min): 3    Daily     Daily Weight in k.7 (29 Mar 2023 04:57)    General:  Appears stated age, well-groomed, well-nourished, no distress  HEENT:  NC/AT,  conjunctivae clear and pink, no thyromegaly, nodules, adenopathy, no JVD, anicteric sclera  Chest:  Full & symmetric excursion, no increased effort, breath sounds clear  Cardiovascular:  Regular rhythm, S1, S2, no murmur/rub/S3/S4, no abdominal bruit, no edema  Abdomen:  Soft, insertion site is inflamed, indurated, tender with purulent draining noted  Extremities:  no cyanosis, clubbing or edema  Skin:  No rash/erythema/ecchymoses/petechiae/wounds/abscess/warm/dry  Neuro/Psych:  Alert, oriented, no asterixis, no tremor, no encephalopathy    Laboratory:                          8.4    12.66 )-----------( 246      ( 29 Mar 2023 14:35 )             26.2         136  |  102  |  21  ----------------------------<  111<H>  3.7   |  27  |  1.70<H>    Ca    8.7      29 Mar 2023 14:35  Phos  3.7       Mg     2.2         TPro  6.7  /  Alb  2.5<L>  /  TBili  0.7  /  DBili  x   /  AST  14<L>  /  ALT  24  /  AlkPhos  115      LIVER FUNCTIONS - ( 29 Mar 2023 14:35 )  Alb: 2.5 g/dL / Pro: 6.7 gm/dL / ALK PHOS: 115 U/L / ALT: 24 U/L / AST: 14 U/L / GGT: x                   Intake and Output    23 @ 07:01  -  23 @ 07:00  --------------------------------------------------------  IN: 360 mL / OUT: 0 mL / NET: 360 mL    23 @ 07:01  -  03-29-23 @ 19:28  --------------------------------------------------------  IN: 118 mL / OUT: 500 mL / NET: -38

## 2023-03-29 NOTE — DIETITIAN INITIAL EVALUATION ADULT - OTHER INFO
Denies difficulty chewing/swallowing.   Reports  pounds 2 weeks ago- states he lost weight. Weight loss as noted below.   Pt with T2DM; per hospitalist note 03/26 [facility]; pt reports he takes Metformin and insulin PTA. States he monitors blood glucose levels PTA. HbA1c 5.8% indicates good blood glucose management.    Pt amenable to taking Glucerna x 2/day (provides 440 kcal, 20 g protein); instructed on uise of nutritional supplement. Pt and PCA made aware RD remains available.  Denies difficulty chewing/swallowing- however poor intake noted.   Reports  pounds 2 weeks ago- states he lost weight. Weight loss as noted below.   Pt with T2DM; per hospitalist note 03/26 [facility]; pt reports he takes Metformin and insulin PTA. States he monitors blood glucose levels PTA. HbA1c 5.8% indicates good blood glucose management.    Pt amenable to taking Glucerna x 2/day (provides 440 kcal, 20 g protein); instructed on uise of nutritional supplement. Pt and PCA made aware RD remains available.

## 2023-03-29 NOTE — DIETITIAN INITIAL EVALUATION ADULT - ORAL INTAKE PTA/DIET HISTORY
Pt reports poor-fair appetite PTA.  Pt reports poor-fair appetite PTA. Per chart pt was on mechanical soft, honey thick liquids PTA.

## 2023-03-29 NOTE — DIETITIAN NUTRITION RISK NOTIFICATION - TREATMENT: THE FOLLOWING DIET HAS BEEN RECOMMENDED
Diet, Consistent Carbohydrate w/Evening Snack:   1500mL Fluid Restriction ([id number])  Low Sodium  Supplement Feeding Modality:  Oral  Glucerna Shake Cans or Servings Per Day:  1       Frequency:  Two Times a day (23 @ 13:28) [Pending Verification By Attending]  Diet, DASH/TLC:   Sodium & Cholesterol Restricted  Consistent Carbohydrate {Evening Snack}  1500mL Fluid Restriction ([id number])     Special Instructions for Nursin milliLiter(s) to 2000 milliLiter(s) fluid restriction (23 @ 17:59) [Active]

## 2023-03-29 NOTE — CONSULT NOTE ADULT - ASSESSMENT
83 y/o M with infected G-Tube site  PMHx of CHF, COPD, Prostate Ca, DM type 2, Atrial Flutter on Eliquis, EMANUEL, recently admitted at the [facility] for 4 weeks for PNA, CHF    Resident noted with damaged gtube with anchors in place, likely placed under fluoroscopy. insertion site is inflamed, indurated, tender with purulent draining noted. Tube removed as well as anchors.   site packed with sterile gauze  recommend abx,   Recommend CT of abdomen with GASTROGRAFIN oral contrast.

## 2023-03-29 NOTE — PROGRESS NOTE ADULT - SUBJECTIVE AND OBJECTIVE BOX
Patient is a 82y old  Male who presents with a chief complaint of Acute on Chronic CHF (28 Mar 2023 13:50)    PAST MEDICAL & SURGICAL HISTORY:  Chronic CHF    Prostate cancer    Type 2 diabetes mellitus    Chronic kidney disease (CKD)    EMANUEL on CPAP    HLD (hyperlipidemia)    COPD    CAD s/p PCI    HTN (hypertension)    INTERVAL HISTORY: stating not feeling good, c/o abd discomfort   	  MEDICATIONS:  MEDICATIONS  (STANDING):  apixaban 2.5 milliGRAM(s) Oral two times a day  atorvastatin 80 milliGRAM(s) Oral at bedtime  budesonide 160 MICROgram(s)/formoterol 4.5 MICROgram(s) Inhaler 2 Puff(s) Inhalation two times a day  clopidogrel Tablet 75 milliGRAM(s) Oral daily  cyanocobalamin 1000 MICROGram(s) Oral daily  dapagliflozin 10 milliGRAM(s) Oral every 24 hours  folic acid 1 milliGRAM(s) Oral daily  furosemide   Injectable 40 milliGRAM(s) IV Push two times a day  insulin lispro (ADMELOG) corrective regimen sliding scale   SubCutaneous three times a day before meals  insulin lispro (ADMELOG) corrective regimen sliding scale   SubCutaneous at bedtime  metoprolol succinate ER 25 milliGRAM(s) Oral daily  midodrine. 5 milliGRAM(s) Oral three times a day  mirtazapine 30 milliGRAM(s) Oral at bedtime  spironolactone 25 milliGRAM(s) Oral daily  tamsulosin 0.4 milliGRAM(s) Oral at bedtime  tiotropium 2.5 MICROgram(s) Inhaler 2 Puff(s) Inhalation daily    MEDICATIONS  (PRN):  acetaminophen     Tablet .. 650 milliGRAM(s) Oral every 6 hours PRN Temp greater or equal to 38C (100.4F), Mild Pain (1 - 3)  aluminum hydroxide/magnesium hydroxide/simethicone Suspension 30 milliLiter(s) Oral every 4 hours PRN Dyspepsia  dextrose Oral Gel 15 Gram(s) Oral once PRN Blood Glucose LESS THAN 70 milliGRAM(s)/deciliter  melatonin 3 milliGRAM(s) Oral at bedtime PRN Insomnia  ondansetron Injectable 4 milliGRAM(s) IV Push every 8 hours PRN Nausea and/or Vomiting  polyethylene glycol 3350 17 Gram(s) Oral daily PRN Constipation    Vitals:  T(F): 97.2 (03-29-23 @ 10:58), Max: 98.4 (03-29-23 @ 04:31)  HR: 68 (03-29-23 @ 10:58) (68 - 96)  BP: 91/56 (03-29-23 @ 10:58) (91/56 - 102/66)  RR: 20 (03-29-23 @ 10:58) (18 - 20)  SpO2: 96% (03-29-23 @ 09:38) (90% - 97%)    03-28 @ 07:01  -  03-29 @ 07:00  --------------------------------------------------------  IN:    Oral Fluid: 360 mL  Total IN: 360 mL    OUT:  Total OUT: 0 mL    Total NET: 360 mL    03-29 @ 07:01  -  03-29 @ 12:19  --------------------------------------------------------  IN:    Oral Fluid: 118 mL  Total IN: 118 mL    OUT:  Total OUT: 0 mL    Total NET: 118 mL    PHYSICAL EXAM:  Neuro: Awake, responsive  CV: S1 S2 RRR  Lungs: few rales to bases   GI: Softly distended, BS +, diffused tenderness   Extremities: Trace LE edema    TELEMETRY: sinus    RADIOLOGY: < from: Xray Chest 1 View AP/PA. (03.25.23 @ 12:53) >  Bibasilar patchy opacities likely atelectasis. Underlying infection is   not excluded.  Small bilateral pleural effusions    < end of copied text >    DIAGNOSTIC TESTING:    [x ] Echocardiogram:   < from: TTE Echo Complete w/o Contrast w/ Doppler (03.28.23 @ 13:46) >  Left Ventricle: The left ventricle was not well visualized.  Global LV systolic function was severely decreased, on limited views.   Left ventricular ejection fraction, by visual estimation, is 25 to 30%.   Suboptimal endocardial border definition noted, suggest contrast echo for   further evaluation, if clinically warranted.  Right Ventricle: Normal right ventricular size and function.  Left Atrium: The left atrium is normal in size.  Right Atrium: The right atrium is normal in size.  Pericardium: There is no evidence of pericardial effusion.  Mitral Valve: Structurally normal mitral valve, with normal leaflet   excursion. There is mild mitral annular calcification. Mild mitral valve   regurgitation is seen.  Tricuspid Valve: Structurally normal tricuspid valve, with normal leaflet   excursion. Mild tricuspid regurgitation is visualized.  Aortic Valve: Normal trileaflet aortic valve with normal opening. No   evidence of aortic valve regurgitation is seen.  Pulmonic Valve: Structurally normal pulmonic valve, with normal leaflet   excursion. No indication of pulmonicvalve regurgitation.  Aorta: The aortic root and ascending aorta are structurally normal, with   no evidence of dilitation.  Pulmonary Artery: The main pulmonary artery is normal in size.      Summary:   1. Left ventricular ejection fraction, by visual estimation, is 25 to   30%.   2. Suboptimal endocardial border definition noted, suggest contrast echo   for further evaluation, if clinically warranted.   3. Mild mitral annular calcification.   4. Mild mitral valve regurgitation.   5. Mild tricuspid regurgitation.    < end of copied text >    LABS:	 	    29 Mar 2023 05:32    138    |  106    |  21     ----------------------------<  93     3.6     |  25     |  1.45   28 Mar 2023 07:00    141    |  110    |  19     ----------------------------<  93     3.9     |  27     |  1.29   27 Mar 2023 07:38    141    |  108    |  21     ----------------------------<  103    4.1     |  29     |  1.33     Ca    8.5        29 Mar 2023 05:32  Phos  3.7       29 Mar 2023 05:32  Mg     2.2       29 Mar 2023 05:32    TPro  6.5    /  Alb  2.3    /  TBili  0.6    /  DBili  x      /  AST  16     /  ALT  23     /  AlkPhos  113    29 Mar 2023 05:32                        8.4    6.91  )-----------( 241      ( 29 Mar 2023 05:32 )             27.4 ,                       8.2    5.94  )-----------( 212      ( 28 Mar 2023 07:00 )             26.7 ,                       7.9    5.64  )-----------( 225      ( 27 Mar 2023 07:38 )             25.3     TSH: Thyroid Stimulating Hormone, Serum: 4.260 uU/mL (03-28 @ 07:00)

## 2023-03-29 NOTE — PROGRESS NOTE ADULT - ASSESSMENT
83 y/o male w/ PMHx of CHF w/ ReF, COPD, Prostate Ca, DM type 2, CAD s/p MI Atrial Flutter on Eliquis, [facility]F, s/p RT for his Prostate as well, discharged on Life Vest, sent in from  rehab for SOB. Pt admitted for acute on chronic systolic CHF    CARDIOLOGY  # HF w/ ReF  # Acute on chronic systolic CHF  - start Lasix 40mg IV BID  - ECHO pending   - Strict I/Os, daily wts  - Fluid restriction  - toprol 25 mg po daily, spironolactone 25 mg po daily     # CAD s/p stents  - c/w plavix, statin    # Atrial Flutter  - c/w BB  - c/w Eliquis    HEME/ONC  # Anemia  - Pt w/ hx of Anemia, BL H/H unknown  - no reported BRBPR or melana  - c/w Folic acid, B12    ENDOCRINOLOGY  # DM type 2  - Pt not [facility] any meds [facility]  - FS qAC and HS w/ SSI    PULMNOLOGY  # COPD  - c/w Inhalers    HEME/ONC  # Prostate Ca  - Pt reports he is s/p 28 day RT  - On Orgovux; nonformulary; pls have family bring in    INFECTIOUS DISEASE  # fever  - 102+ rectal  - unclear source  - sepsis work up  - start zosyn    PLAN  - c/w TELE  - consult CARDIOLOGY for evaluation of decompensation  - trend heme  - cw albuterol for wheez     # DVT ppx - c/w Eliquis    Pt is full code    - spoke to pt's son gave him updates on 3/29. informed him that the transfer process has been started but due to new fever of 102+, Dr Rain from [facility] recommended workup and ensuring clinical stability prior to transfer. Dr Rain will reevaluate am on 3/30

## 2023-03-29 NOTE — CONSULT NOTE ADULT - SUBJECTIVE AND OBJECTIVE BOX
Full consult dictated    Plan:  81 yo M with an extensive PMHx including CHF, COPD, Prostate Ca, DM, Atrial Flutter on Eliquis, EMANUEL, recently at [facility] for 4 weeks for PNA, CHF, s/p RT for his Prostate.Now admitted from rehab with SOB. Pt has a peg which is no longer being used. Asked to evaluate for infected peg site. There is some induration but no abscess. The peg is also being fastened to skin by some type of foreign body.  Will ask surgery to look at peg site about possible removal. Continue present antibx for now

## 2023-03-29 NOTE — DIETITIAN INITIAL EVALUATION ADULT - PERTINENT LABORATORY DATA
03-29    138  |  106  |  21  ----------------------------<  93  3.6   |  25  |  1.45<H>    Ca    8.5      29 Mar 2023 05:32  Phos  3.7     03-29  Mg     2.2     03-29    TPro  6.5  /  Alb  2.3<L>  /  TBili  0.6  /  DBili  x   /  AST  16  /  ALT  23  /  AlkPhos  113  03-2    CAPILLARY BLOOD GLUCOSE  POCT Blood Glucose.: 135 mg/dL (29 Mar 2023 11:40)  POCT Blood Glucose.: 106 mg/dL (29 Mar 2023 08:30)  POCT Blood Glucose.: 117 mg/dL (28 Mar 2023 21:32)  POCT Blood Glucose.: 84 mg/dL (28 Mar 2023 16:53)    A1C with Estimated Average Glucose Result: 5.8 % (03-26-23 @ 07:00)

## 2023-03-29 NOTE — DIETITIAN INITIAL EVALUATION ADULT - DIET TYPE
fluid restriction/Add Glucerna x 2/day (provides 440 kcal, 20 g protein)/low sodium/1500ml/consistent carbohydrate (evening snack)

## 2023-03-29 NOTE — PROGRESS NOTE ADULT - SUBJECTIVE AND OBJECTIVE BOX
Patient is a 82y old  Male who presents with a chief complaint of CHF EXACERBATION     (29 Mar 2023 13:12)    INTERVAL HPI/OVERNIGHT EVENTS: no acute events overnight  SUBJECTIVE: no fever/chills. reports not feeling very well    MEDICATIONS  (STANDING):  albuterol    0.083%. 2.5 milliGRAM(s) Nebulizer once  apixaban 2.5 milliGRAM(s) Oral two times a day  atorvastatin 80 milliGRAM(s) Oral at bedtime  budesonide 160 MICROgram(s)/formoterol 4.5 MICROgram(s) Inhaler 2 Puff(s) Inhalation two times a day  clopidogrel Tablet 75 milliGRAM(s) Oral daily  cyanocobalamin 1000 MICROGram(s) Oral daily  dapagliflozin 10 milliGRAM(s) Oral every 24 hours  dextrose 5%. 1000 milliLiter(s) (50 mL/Hr) IV Continuous <Continuous>  dextrose 5%. 1000 milliLiter(s) (100 mL/Hr) IV Continuous <Continuous>  dextrose 50% Injectable 25 Gram(s) IV Push once  dextrose 50% Injectable 12.5 Gram(s) IV Push once  dextrose 50% Injectable 25 Gram(s) IV Push once  folic acid 1 milliGRAM(s) Oral daily  furosemide   Injectable 40 milliGRAM(s) IV Push two times a day  glucagon  Injectable 1 milliGRAM(s) IntraMuscular once  insulin lispro (ADMELOG) corrective regimen sliding scale   SubCutaneous three times a day before meals  insulin lispro (ADMELOG) corrective regimen sliding scale   SubCutaneous at bedtime  metoprolol succinate ER 25 milliGRAM(s) Oral daily  midodrine. 5 milliGRAM(s) Oral three times a day  mirtazapine 30 milliGRAM(s) Oral at bedtime  piperacillin/tazobactam IVPB.- 3.375 Gram(s) IV Intermittent once  spironolactone 25 milliGRAM(s) Oral daily  tamsulosin 0.4 milliGRAM(s) Oral at bedtime  tiotropium 2.5 MICROgram(s) Inhaler 2 Puff(s) Inhalation daily    MEDICATIONS  (PRN):  acetaminophen     Tablet .. 650 milliGRAM(s) Oral every 6 hours PRN Temp greater or equal to 38C (100.4F), Mild Pain (1 - 3)  aluminum hydroxide/magnesium hydroxide/simethicone Suspension 30 milliLiter(s) Oral every 4 hours PRN Dyspepsia  dextrose Oral Gel 15 Gram(s) Oral once PRN Blood Glucose LESS THAN 70 milliGRAM(s)/deciliter  melatonin 3 milliGRAM(s) Oral at bedtime PRN Insomnia  ondansetron Injectable 4 milliGRAM(s) IV Push every 8 hours PRN Nausea and/or Vomiting  polyethylene glycol 3350 17 Gram(s) Oral daily PRN Constipation    Allergies    Hytrin (Unknown)  peanuts (Unknown)  rabeprazole (Unknown)  yellow dye (Hives)    Intolerances      REVIEW OF SYSTEMS:  All other systems reviewed and are negative    Vital Signs Last 24 Hrs  T(C): 39.1 (29 Mar 2023 12:45), Max: 39.1 (29 Mar 2023 12:45)  T(F): 102.4 (29 Mar 2023 12:45), Max: 102.4 (29 Mar 2023 12:45)  HR: 68 (29 Mar 2023 10:58) (68 - 96)  BP: 91/56 (29 Mar 2023 10:58) (91/56 - 102/66)  BP(mean): --  RR: 20 (29 Mar 2023 10:58) (18 - 20)  SpO2: 96% (29 Mar 2023 09:38) (90% - 97%)    Parameters below as of 29 Mar 2023 10:58  Patient On (Oxygen Delivery Method): nasal cannula  O2 Flow (L/min): 3    Daily     Daily Weight in k.7 (29 Mar 2023 04:57)  I&O's Summary    28 Mar 2023 07  -  29 Mar 2023 07:00  --------------------------------------------------------  IN: 360 mL / OUT: 0 mL / NET: 360 mL    29 Mar 2023 07:  -  29 Mar 2023 14:03  --------------------------------------------------------  IN: 118 mL / OUT: 0 mL / NET: 118 mL      CAPILLARY BLOOD GLUCOSE      POCT Blood Glucose.: 135 mg/dL (29 Mar 2023 11:40)  POCT Blood Glucose.: 106 mg/dL (29 Mar 2023 08:30)  POCT Blood Glucose.: 117 mg/dL (28 Mar 2023 21:32)  POCT Blood Glucose.: 84 mg/dL (28 Mar 2023 16:53)    PHYSICAL EXAM:  GENERAL: NAD, well-groomed, well-developed  HEAD:  Atraumatic, Normocephalic  EYES: EOMI, PERRLA, conjunctiva and sclera clear  ENMT: No tonsillar erythema, exudates, or enlargement; Moist mucous membranes, Good dentition, No lesions  NECK: Supple, No JVD, Normal thyroid  NERVOUS SYSTEM:  Alert & Oriented X3, Good concentration; Motor Strength 5/5 B/L upper and lower extremities; DTRs 2+ intact and symmetric  CHEST/LUNG: wheezing  HEART: Regular rate and rhythm; No murmurs, rubs, or gallops  ABDOMEN: Soft, mildy tender in the right lower quadrant  EXTREMITIES:  2+ Peripheral Pulses, No clubbing, cyanosis, or edema  LYMPH: No lymphadenopathy noted  SKIN: No rashes or lesions    Labs                          8.4    6.91  )-----------( 241      ( 29 Mar 2023 05:32 )             27.4     -    138  |  106  |  21  ----------------------------<  93  3.6   |  25  |  1.45<H>    Ca    8.5      29 Mar 2023 05:32  Phos  3.7       Mg     2.2         TPro  6.5  /  Alb  2.3<L>  /  TBili  0.6  /  DBili  x   /  AST  16  /  ALT  23  /  AlkPhos  113

## 2023-03-29 NOTE — PROGRESS NOTE ADULT - ASSESSMENT
81 yo male with likely history of ischemic cardiomyopathy. Also chr AF, CAD s/p PCI 3 years ago, DM2, COPD.  [facility], FTT.  Here for acute dyspnea x one day and productive cough, likely sec to exacerbation of CHF.  No evidence of acute ischemia, but cath [facility].  Is on life vest; not wearing at present, patient needs education regarding its use prior to discharge, Rebecca Vicente was contacted   repeat TTE with EF 25-30%    Pt now with c/o diffused abd discomfort, stating "feeling weak", Temp of 102.6 R    Plan:  Telemetry monitoring  Responding to IV Lasix bid, can be decreased to once a day dosing   monitor renal function, electrolytes and daily weights.  Optimize GDMT, currently low BP precludes adding Entresto, bbl /aldactone held 2/2 low BP, currently on midodrine for BP support   Symptoms likely worsened by significant anemia, etiology? On Plavix as well as DOAC, this may be chronic. anemia w/u as per primary team   sepsis w/u

## 2023-03-29 NOTE — DIETITIAN NUTRITION RISK NOTIFICATION - ADDITIONAL COMMENTS/DIETITIAN RECOMMENDATIONS
swallow evaluation- pt was previously on modified diet as per records (mechanical soft, honey thick liquids)

## 2023-03-29 NOTE — CONSULT NOTE ADULT - SUBJECTIVE AND OBJECTIVE BOX
[facility]  INFECTIOUS DISEASES   [address] [place] [address]  Tel: [phone]     Fax: [phone]  ======================================================  Chavez MD Jonathan Garellek, DO Faina Frey, KANDIS   ======================================================    KENDRICK WEBER  MRN-[id number]  Male    Patient is a 82y old  Male who presents with a chief complaint of Acute on Chronic CHF (29 Mar 2023 14:03)      HPI:        81 y/o M w/ PMHx of CHF, COPD, Prostate Ca, DM type 2, Atrial Flutter on Eliquis, EMANUEL, recently admitted at the [facility] for 4 weeks for PNA, CHF, s/p RT for his Prostate as well, discharged on Life Vest, sent in from  rehab for SOB. Pt reports SOB began the day he was discharged and has been getting worse. Pt also reports nonproductive wet cough related to PNA which he was treated for with IV abx. Pt reports mild L sided chest tightness, denies palpitations or dizziness. Pt reports a PEG tube was placed as he was not eating, but pt reports his appetite is back and he is eating.     Pt initially placed on Bi-PAP on arrival in ED, now SPO2 98% on 4L NC (25 Mar 2023 20:08)    Patient admitted for CHF exacerbation. He had a PEG tube placed around 2-3 weeks ago due to poor appetite. spiked a fever.  ID consulted for workup and antibiotic management     PAST MEDICAL & SURGICAL HISTORY:  Chronic CHF      Prostate cancer      Type 2 diabetes mellitus      Chronic kidney disease (CKD)      EMANUEL on CPAP      HLD (hyperlipidemia)      History of COPD      HTN (hypertension)      No significant past surgical history          Allergies  Hytrin (Unknown)  peanuts (Unknown)  rabeprazole (Unknown)  yellow dye (Hives)        ANTIMICROBIALS:  cefepime   IVPB    vancomycin  IVPB        MEDICATIONS  (STANDING):    piperacillin/tazobactam IVPB.   200 mL/Hr IV Intermittent (03-29-23 @ 13:54)        OTHER MEDS: MEDICATIONS  (STANDING):  acetaminophen     Tablet .. 650 every 6 hours PRN  albuterol    0.083%. 2.5 once  albuterol/ipratropium for Nebulization 3 every 6 hours  aluminum hydroxide/magnesium hydroxide/simethicone Suspension 30 every 4 hours PRN  apixaban 2.5 two times a day  atorvastatin 80 at bedtime  budesonide 160 MICROgram(s)/formoterol 4.5 MICROgram(s) Inhaler 2 two times a day  clopidogrel Tablet 75 daily  dapagliflozin 10 every 24 hours  dextrose 50% Injectable 25 once  dextrose 50% Injectable 12.5 once  dextrose 50% Injectable 25 once  dextrose Oral Gel 15 once PRN  furosemide   Injectable 40 two times a day  glucagon  Injectable 1 once  insulin lispro (ADMELOG) corrective regimen sliding scale  three times a day before meals  insulin lispro (ADMELOG) corrective regimen sliding scale  at bedtime  melatonin 3 at bedtime PRN  metoprolol succinate ER 25 daily  midodrine. 5 three times a day  mirtazapine 30 at bedtime  ondansetron Injectable 4 every 8 hours PRN  polyethylene glycol 3350 17 daily PRN  spironolactone 25 daily  tamsulosin 0.4 at bedtime  tiotropium 2.5 MICROgram(s) Inhaler 2 daily      SOCIAL HISTORY:     Smoking Cigarettes [ ]Active [ X] Former [ ]Denies   ETOH [ ]denies [X ]Former [ ]Current Use denies   Drug Use [ X]Never [ ] Former [ ] Active     FAMILY HISTORY:  mother had colon cancer        REVIEW OF SYSTEMS  [  ] ROS unobtainable because:    [ X] All other systems negative except as noted below:	    Constitutional:  [x ] fever [x ] chills  [ ] weight loss  [X ] weakness  Skin:  [ ] rash [ ] phlebitis	  Eyes: [ ] icterus [ ] pain  [ ] discharge	  ENMT: [ ] sore throat  [ ] thrush [ ] ulcers [ ] exudates  Respiratory: [ ] dyspnea [ ] hemoptysis [ ] cough [ ] sputum	  Cardiovascular:  [ ] chest pain [ ] palpitations [ ] edema	  Gastrointestinal:  [ ] nausea [ ] vomiting [ ] diarrhea [ ] constipation [ ] pain	  Genitourinary:  [ ] dysuria [ ] frequency [ ] hematuria [ ] discharge [ ] flank pain  [ ] incontinence  Musculoskeletal:  [ ] myalgias [ ] arthralgias [ ] arthritis  [ ] back pain  Neurological:  [ ] headache [ ] seizures  [ ] confusion/altered mental status  Psychiatric:  [ ] anxiety [ ] depression	  Hematology/Lymphatics:  [ ] lymphadenopathy  Endocrine:  [ ] adrenal [ ] thyroid  Allergic/Immunologic:	 [ ] transplant [ ] seasonal    Vital Signs Last 24 Hrs  T(F): 102.4 (03-29-23 @ 12:45), Max: 102.4 (03-29-23 @ 12:45)    Vital Signs Last 24 Hrs  HR: 68 (03-29-23 @ 10:58) (68 - 96)  BP: 91/56 (03-29-23 @ 10:58) (91/56 - 102/66)  RR: 20 (03-29-23 @ 10:58)  SpO2: 96% (03-29-23 @ 09:38) (90% - 97%)  Wt(kg): --    PHYSICAL EXAM:  Constitutional: non-toxic, no distress, on nasal cannula   HEAD/EYES: anicteric, no conjunctival injection  ENT:  supple, no thrush  Cardiovascular:   normal S1, S2, no murmur, +edema  Respiratory:  crackles and rales bilaterally,  GI:  soft, normal bowel sounds, obese, +PEG with drainage/purulence, mild erythema around the peg site and tenderness,rest of the quadrants are nontender  :  no leigh, no CVA tenderness  Musculoskeletal:  no synovitis, difficulty raising legs off the bed  Neurologic: awake and alert to person place time and situation, 3/5 strength in lower extremity bilateral , no focal findings  Skin:  no rash, no erythema, no phlebitis  Heme/Onc: no lymphadenopathy   Psychiatric:  awake, alert, appropriate mood      WBC Count: 12.66 K/uL (03-29 @ 14:35)  WBC Count: 6.91 K/uL (03-29 @ 05:32)  WBC Count: 5.94 K/uL (03-28 @ 07:00)  WBC Count: 5.64 K/uL (03-27 @ 07:38)  WBC Count: 8.00 K/uL (03-26 @ 07:00)  WBC Count: 11.72 K/uL (03-25 @ 12:15)      Auto Neutrophil %: 73.7 % (03-28-23 @ 07:00)  Auto Neutrophil #: 4.38 K/uL (03-28-23 @ 07:00)  Auto Neutrophil %: 91.0 % *H* (03-25-23 @ 12:15)  Auto Neutrophil #: 10.67 K/uL *H* (03-25-23 @ 12:15)                            8.4    12.66 )-----------( 246      ( 29 Mar 2023 14:35 )             26.2       03-29    138  |  106  |  21  ----------------------------<  93  3.6   |  25  |  1.45<H>    Ca    8.5      29 Mar 2023 05:32  Phos  3.7     03-29  Mg     2.2     03-29    TPro  6.5  /  Alb  2.3<L>  /  TBili  0.6  /  DBili  x   /  AST  16  /  ALT  23  /  AlkPhos  113  03-29      Creatinine Trend: 1.45<--, 1.29<--, 1.33<--, 1.23<--, 1.32<--      MICROBIOLOGY:  SARS-CoV-2 Result: NotDete: EUA/IVD  This Respiratory Panel uses polymerase chain reaction (PCR) to detect for  influenza A; influenza B; and SARS-CoV-2.  This test was validated by [employer] includes the [facility] and is in use under the FDA  Emergency Use Authorization (EUA) for clinical labs CLIA-certified to  perform high complexity testing. Test results should be correlated with  clinical presentation, patient history, and epidemiology. (03.25.23 @ 23:10)      SARS-CoV-2 Result: NotDete (03-25-23 @ 23:10)      RADIOLOGY:  < from: Xray Chest 1 View AP/PA. (03.25.23 @ 12:53) >  IMPRESSION:    Bibasilar patchy opacities likely atelectasis. Underlying infection is   not excluded.  Small bilateral pleural effusions    < end of copied text >    I have personally reviewed the above imaging

## 2023-03-29 NOTE — DIETITIAN INITIAL EVALUATION ADULT - REASON INDICATOR FOR ASSESSMENT
Pt seen for nutrition consult for assessment and tube feed- pt noted with PEG however currently eating by mouth  Also seen for CHF dx

## 2023-03-29 NOTE — DIETITIAN INITIAL EVALUATION ADULT - PERTINENT MEDS FT
MEDICATIONS  (STANDING):  albuterol    0.083%. 2.5 milliGRAM(s) Nebulizer once  apixaban 2.5 milliGRAM(s) Oral two times a day  atorvastatin 80 milliGRAM(s) Oral at bedtime  budesonide 160 MICROgram(s)/formoterol 4.5 MICROgram(s) Inhaler 2 Puff(s) Inhalation two times a day  clopidogrel Tablet 75 milliGRAM(s) Oral daily  cyanocobalamin 1000 MICROGram(s) Oral daily  dapagliflozin 10 milliGRAM(s) Oral every 24 hours  dextrose 5%. 1000 milliLiter(s) (50 mL/Hr) IV Continuous <Continuous>  dextrose 5%. 1000 milliLiter(s) (100 mL/Hr) IV Continuous <Continuous>  dextrose 50% Injectable 25 Gram(s) IV Push once  dextrose 50% Injectable 12.5 Gram(s) IV Push once  dextrose 50% Injectable 25 Gram(s) IV Push once  folic acid 1 milliGRAM(s) Oral daily  furosemide   Injectable 40 milliGRAM(s) IV Push two times a day  glucagon  Injectable 1 milliGRAM(s) IntraMuscular once  insulin lispro (ADMELOG) corrective regimen sliding scale   SubCutaneous three times a day before meals  insulin lispro (ADMELOG) corrective regimen sliding scale   SubCutaneous at bedtime  metoprolol succinate ER 25 milliGRAM(s) Oral daily  midodrine. 5 milliGRAM(s) Oral three times a day  mirtazapine 30 milliGRAM(s) Oral at bedtime  piperacillin/tazobactam IVPB. 3.375 Gram(s) IV Intermittent once  piperacillin/tazobactam IVPB.- 3.375 Gram(s) IV Intermittent once  spironolactone 25 milliGRAM(s) Oral daily  tamsulosin 0.4 milliGRAM(s) Oral at bedtime  tiotropium 2.5 MICROgram(s) Inhaler 2 Puff(s) Inhalation daily    MEDICATIONS  (PRN):  acetaminophen     Tablet .. 650 milliGRAM(s) Oral every 6 hours PRN Temp greater or equal to 38C (100.4F), Mild Pain (1 - 3)  aluminum hydroxide/magnesium hydroxide/simethicone Suspension 30 milliLiter(s) Oral every 4 hours PRN Dyspepsia  dextrose Oral Gel 15 Gram(s) Oral once PRN Blood Glucose LESS THAN 70 milliGRAM(s)/deciliter  melatonin 3 milliGRAM(s) Oral at bedtime PRN Insomnia  ondansetron Injectable 4 milliGRAM(s) IV Push every 8 hours PRN Nausea and/or Vomiting  polyethylene glycol 3350 17 Gram(s) Oral daily PRN Constipation

## 2023-03-29 NOTE — DIETITIAN INITIAL EVALUATION ADULT - NS FNS DIET ORDER
Diet, DASH/TLC:   Sodium & Cholesterol Restricted  Consistent Carbohydrate {Evening Snack}  1500mL Fluid Restriction ([id number])     Special Instructions for Nursin milliLiter(s) to 2000 milliLiter(s) fluid restriction (23 @ 17:59) [Active]

## 2023-03-30 LAB
ALBUMIN SERPL ELPH-MCNC: 2.3 G/DL — LOW (ref 3.3–5)
ALP SERPL-CCNC: 105 U/L — SIGNIFICANT CHANGE UP (ref 40–120)
ALT FLD-CCNC: 18 U/L — SIGNIFICANT CHANGE UP (ref 12–78)
ANION GAP SERPL CALC-SCNC: 8 MMOL/L — SIGNIFICANT CHANGE UP (ref 5–17)
APPEARANCE UR: CLEAR — SIGNIFICANT CHANGE UP
AST SERPL-CCNC: 15 U/L — SIGNIFICANT CHANGE UP (ref 15–37)
BACTERIA # UR AUTO: ABNORMAL
BILIRUB SERPL-MCNC: 0.7 MG/DL — SIGNIFICANT CHANGE UP (ref 0.2–1.2)
BILIRUB UR-MCNC: NEGATIVE — SIGNIFICANT CHANGE UP
BUN SERPL-MCNC: 26 MG/DL — HIGH (ref 7–23)
CALCIUM SERPL-MCNC: 8.8 MG/DL — SIGNIFICANT CHANGE UP (ref 8.5–10.1)
CHLORIDE SERPL-SCNC: 104 MMOL/L — SIGNIFICANT CHANGE UP (ref 96–108)
CO2 SERPL-SCNC: 26 MMOL/L — SIGNIFICANT CHANGE UP (ref 22–31)
COLOR SPEC: YELLOW — SIGNIFICANT CHANGE UP
COMMENT - URINE: SIGNIFICANT CHANGE UP
CREAT SERPL-MCNC: 1.59 MG/DL — HIGH (ref 0.5–1.3)
DIFF PNL FLD: ABNORMAL
EGFR: 44 ML/MIN/1.73M2 — LOW
EPI CELLS # UR: SIGNIFICANT CHANGE UP
GLUCOSE BLDC GLUCOMTR-MCNC: 71 MG/DL — SIGNIFICANT CHANGE UP (ref 70–99)
GLUCOSE BLDC GLUCOMTR-MCNC: 84 MG/DL — SIGNIFICANT CHANGE UP (ref 70–99)
GLUCOSE BLDC GLUCOMTR-MCNC: 85 MG/DL — SIGNIFICANT CHANGE UP (ref 70–99)
GLUCOSE BLDC GLUCOMTR-MCNC: 93 MG/DL — SIGNIFICANT CHANGE UP (ref 70–99)
GLUCOSE SERPL-MCNC: 80 MG/DL — SIGNIFICANT CHANGE UP (ref 70–99)
GLUCOSE UR QL: 1000 MG/DL
HCT VFR BLD CALC: 25.7 % — LOW (ref 39–50)
HGB BLD-MCNC: 7.9 G/DL — LOW (ref 13–17)
KETONES UR-MCNC: ABNORMAL
LEUKOCYTE ESTERASE UR-ACNC: ABNORMAL
MAGNESIUM SERPL-MCNC: 2.2 MG/DL — SIGNIFICANT CHANGE UP (ref 1.6–2.6)
MCHC RBC-ENTMCNC: 30.7 G/DL — LOW (ref 32–36)
MCHC RBC-ENTMCNC: 32 PG — SIGNIFICANT CHANGE UP (ref 27–34)
MCV RBC AUTO: 104 FL — HIGH (ref 80–100)
NITRITE UR-MCNC: POSITIVE
NRBC # BLD: 0 /100 WBCS — SIGNIFICANT CHANGE UP (ref 0–0)
PH UR: 5 — SIGNIFICANT CHANGE UP (ref 5–8)
PHOSPHATE SERPL-MCNC: 4.1 MG/DL — SIGNIFICANT CHANGE UP (ref 2.5–4.5)
PLATELET # BLD AUTO: 199 K/UL — SIGNIFICANT CHANGE UP (ref 150–400)
POTASSIUM SERPL-MCNC: 3.5 MMOL/L — SIGNIFICANT CHANGE UP (ref 3.5–5.3)
POTASSIUM SERPL-SCNC: 3.5 MMOL/L — SIGNIFICANT CHANGE UP (ref 3.5–5.3)
PROCALCITONIN SERPL-MCNC: 2.42 NG/ML — HIGH (ref 0.02–0.1)
PROT SERPL-MCNC: 6.4 GM/DL — SIGNIFICANT CHANGE UP (ref 6–8.3)
PROT UR-MCNC: 30 MG/DL
RAPID RVP RESULT: SIGNIFICANT CHANGE UP
RBC # BLD: 2.47 M/UL — LOW (ref 4.2–5.8)
RBC # FLD: 16.6 % — HIGH (ref 10.3–14.5)
RBC CASTS # UR COMP ASSIST: SIGNIFICANT CHANGE UP /HPF (ref 0–4)
SARS-COV-2 RNA SPEC QL NAA+PROBE: SIGNIFICANT CHANGE UP
SODIUM SERPL-SCNC: 138 MMOL/L — SIGNIFICANT CHANGE UP (ref 135–145)
SP GR SPEC: 1.02 — SIGNIFICANT CHANGE UP (ref 1.01–1.02)
UROBILINOGEN FLD QL: NEGATIVE MG/DL — SIGNIFICANT CHANGE UP
WBC # BLD: 7.05 K/UL — SIGNIFICANT CHANGE UP (ref 3.8–10.5)
WBC # FLD AUTO: 7.05 K/UL — SIGNIFICANT CHANGE UP (ref 3.8–10.5)
WBC UR QL: ABNORMAL

## 2023-03-30 PROCEDURE — 74176 CT ABD & PELVIS W/O CONTRAST: CPT | Mod: 26

## 2023-03-30 PROCEDURE — 99232 SBSQ HOSP IP/OBS MODERATE 35: CPT

## 2023-03-30 PROCEDURE — 71250 CT THORAX DX C-: CPT | Mod: 26

## 2023-03-30 PROCEDURE — 99233 SBSQ HOSP IP/OBS HIGH 50: CPT

## 2023-03-30 RX ORDER — POLYETHYLENE GLYCOL 3350 17 G/17G
17 POWDER, FOR SOLUTION ORAL DAILY
Refills: 0 | Status: DISCONTINUED | OUTPATIENT
Start: 2023-03-30 | End: 2023-04-06

## 2023-03-30 RX ORDER — FUROSEMIDE 40 MG
40 TABLET ORAL DAILY
Refills: 0 | Status: DISCONTINUED | OUTPATIENT
Start: 2023-03-30 | End: 2023-03-31

## 2023-03-30 RX ORDER — DIATRIZOATE MEGLUMINE 180 MG/ML
240 INJECTION, SOLUTION INTRAVESICAL ONCE
Refills: 0 | Status: DISCONTINUED | OUTPATIENT
Start: 2023-03-30 | End: 2023-03-30

## 2023-03-30 RX ORDER — DIATRIZOATE MEGLUMINE 180 MG/ML
30 INJECTION, SOLUTION INTRAVESICAL ONCE
Refills: 0 | Status: COMPLETED | OUTPATIENT
Start: 2023-03-30 | End: 2023-03-30

## 2023-03-30 RX ADMIN — MIDODRINE HYDROCHLORIDE 5 MILLIGRAM(S): 2.5 TABLET ORAL at 17:57

## 2023-03-30 RX ADMIN — Medication 3 MILLILITER(S): at 05:34

## 2023-03-30 RX ADMIN — DIATRIZOATE MEGLUMINE 30 MILLILITER(S): 180 INJECTION, SOLUTION INTRAVESICAL at 16:30

## 2023-03-30 RX ADMIN — MIDODRINE HYDROCHLORIDE 5 MILLIGRAM(S): 2.5 TABLET ORAL at 05:20

## 2023-03-30 RX ADMIN — CEFEPIME 100 MILLIGRAM(S): 1 INJECTION, POWDER, FOR SOLUTION INTRAMUSCULAR; INTRAVENOUS at 00:45

## 2023-03-30 RX ADMIN — CEFEPIME 100 MILLIGRAM(S): 1 INJECTION, POWDER, FOR SOLUTION INTRAMUSCULAR; INTRAVENOUS at 22:13

## 2023-03-30 RX ADMIN — PREGABALIN 1000 MICROGRAM(S): 225 CAPSULE ORAL at 12:14

## 2023-03-30 RX ADMIN — Medication 3 MILLILITER(S): at 00:17

## 2023-03-30 RX ADMIN — Medication 3 MILLILITER(S): at 11:51

## 2023-03-30 RX ADMIN — Medication 250 MILLIGRAM(S): at 16:40

## 2023-03-30 RX ADMIN — CEFEPIME 100 MILLIGRAM(S): 1 INJECTION, POWDER, FOR SOLUTION INTRAMUSCULAR; INTRAVENOUS at 17:57

## 2023-03-30 RX ADMIN — Medication 3 MILLILITER(S): at 23:37

## 2023-03-30 RX ADMIN — BUDESONIDE AND FORMOTEROL FUMARATE DIHYDRATE 2 PUFF(S): 160; 4.5 AEROSOL RESPIRATORY (INHALATION) at 05:20

## 2023-03-30 RX ADMIN — TIOTROPIUM BROMIDE 2 PUFF(S): 18 CAPSULE ORAL; RESPIRATORY (INHALATION) at 12:15

## 2023-03-30 RX ADMIN — ATORVASTATIN CALCIUM 80 MILLIGRAM(S): 80 TABLET, FILM COATED ORAL at 22:14

## 2023-03-30 RX ADMIN — Medication 3 MILLILITER(S): at 17:14

## 2023-03-30 RX ADMIN — CEFEPIME 100 MILLIGRAM(S): 1 INJECTION, POWDER, FOR SOLUTION INTRAMUSCULAR; INTRAVENOUS at 11:00

## 2023-03-30 RX ADMIN — MIDODRINE HYDROCHLORIDE 5 MILLIGRAM(S): 2.5 TABLET ORAL at 12:14

## 2023-03-30 RX ADMIN — APIXABAN 2.5 MILLIGRAM(S): 2.5 TABLET, FILM COATED ORAL at 17:57

## 2023-03-30 RX ADMIN — TAMSULOSIN HYDROCHLORIDE 0.4 MILLIGRAM(S): 0.4 CAPSULE ORAL at 22:13

## 2023-03-30 RX ADMIN — MIRTAZAPINE 30 MILLIGRAM(S): 45 TABLET, ORALLY DISINTEGRATING ORAL at 22:14

## 2023-03-30 RX ADMIN — CLOPIDOGREL BISULFATE 75 MILLIGRAM(S): 75 TABLET, FILM COATED ORAL at 12:14

## 2023-03-30 RX ADMIN — APIXABAN 2.5 MILLIGRAM(S): 2.5 TABLET, FILM COATED ORAL at 05:20

## 2023-03-30 RX ADMIN — DAPAGLIFLOZIN 10 MILLIGRAM(S): 10 TABLET, FILM COATED ORAL at 00:45

## 2023-03-30 RX ADMIN — Medication 1 MILLIGRAM(S): at 12:14

## 2023-03-30 NOTE — PROGRESS NOTE ADULT - ASSESSMENT
81 yo male with likely history of ischemic cardiomyopathy. Also chr AF, CAD s/p PCI 3 years ago, DM2, COPD.  [facility], FTT.  Here for acute dyspnea x one day and productive cough, likely sec to exacerbation of CHF.  No evidence of acute ischemia, but cath [facility] pending at [facility].  Is on life vest; not wearing at present, patient needs education regarding its use prior to discharge, Rebecca Vicente was informed and will contact the Rep prior to discharge  repeat TTE with EF 25-30%    Pt with c/o diffused abd discomfort with Temp of 102.6 R on 3/29 , PEG site found to be inflamed, indurated, Tube removed, started on ABx     Plan:  Telemetry monitoring  Responding to IV Lasix bid, can be decreased to once a day dosing   monitor renal function, electrolytes and daily weights.  Optimize GDMT, currently low BP precludes adding Entresto, bbl /aldactone doses held 2/2 low BP, currently on midodrine for BP support   Symptoms likely worsened by significant anemia, etiology? On Plavix as well as DOAC, this may be chronic. anemia w/u as per primary team   sepsis w/u, PEG tube removed, ABx as per ID  [facility] [place], transfer process has been started by medicine team but due to new fever, Dr Rain from [facility] recommended workup and ensuring clinical stability prior to transfer.   If no transfer occurs and pt is getting discharged from here, pt and family should be educated on use of Life Vest prior to discharge, Rebecca Hernandez can be reached at  for retraining.

## 2023-03-30 NOTE — PROGRESS NOTE ADULT - SUBJECTIVE AND OBJECTIVE BOX
KENDRICK WEBER  MRN-[id number]    Follow Up:  Fever, ?infected peg site    Interval History: the pt was seen and examined earlier, no acute distress, complains of constipation, did not have a bowel movement for three days, thinks his appetite is diminished because of it. Pt had a fever yesterday 102.4, none today, PEG was removed yesterday.     PAST MEDICAL & SURGICAL HISTORY:  Chronic CHF      Prostate cancer      Type 2 diabetes mellitus      Chronic kidney disease (CKD)      EMANUEL on CPAP      HLD (hyperlipidemia)      History of COPD      HTN (hypertension)      No significant past surgical history          ROS:    [ ] Unobtainable because:  [x ] All other systems negative    Constitutional: no fever, no chills  Head: no trauma  Eyes: no vision changes, no eye pain  ENT:  no sore throat, no rhinorrhea  Cardiovascular:  no chest pain, no palpitation  Respiratory:  no SOB, + cough  GI:  no abd pain, no vomiting, + constipation   urinary: no dysuria, no hematuria, no flank pain  musculoskeletal:  no joint pain, no joint swelling  skin:  no rash  neurology:  no headache, no seizure, no change in mental status  psych: no anxiety, no depression         Allergies  Hytrin (Unknown)  peanuts (Unknown)  rabeprazole (Unknown)  yellow dye (Hives)        ANTIMICROBIALS:  cefepime   IVPB    cefepime   IVPB 1000 every 8 hours  vancomycin  IVPB 1000 every 24 hours  vancomycin  IVPB        OTHER MEDS:  acetaminophen     Tablet .. 650 milliGRAM(s) Oral every 6 hours PRN  albuterol    0.083%. 2.5 milliGRAM(s) Nebulizer once  albuterol/ipratropium for Nebulization 3 milliLiter(s) Nebulizer every 6 hours  aluminum hydroxide/magnesium hydroxide/simethicone Suspension 30 milliLiter(s) Oral every 4 hours PRN  apixaban 2.5 milliGRAM(s) Oral two times a day  atorvastatin 80 milliGRAM(s) Oral at bedtime  budesonide 160 MICROgram(s)/formoterol 4.5 MICROgram(s) Inhaler 2 Puff(s) Inhalation two times a day  clopidogrel Tablet 75 milliGRAM(s) Oral daily  cyanocobalamin 1000 MICROGram(s) Oral daily  dapagliflozin 10 milliGRAM(s) Oral every 24 hours  dextrose 5%. 1000 milliLiter(s) IV Continuous <Continuous>  dextrose 5%. 1000 milliLiter(s) IV Continuous <Continuous>  dextrose 50% Injectable 25 Gram(s) IV Push once  dextrose 50% Injectable 12.5 Gram(s) IV Push once  dextrose 50% Injectable 25 Gram(s) IV Push once  dextrose Oral Gel 15 Gram(s) Oral once PRN  diatrizoate meglumine/diatrizoate sodium. 30 milliLiter(s) Oral once  folic acid 1 milliGRAM(s) Oral daily  furosemide   Injectable 40 milliGRAM(s) IV Push daily  glucagon  Injectable 1 milliGRAM(s) IntraMuscular once  insulin lispro (ADMELOG) corrective regimen sliding scale   SubCutaneous three times a day before meals  insulin lispro (ADMELOG) corrective regimen sliding scale   SubCutaneous at bedtime  melatonin 3 milliGRAM(s) Oral at bedtime PRN  metoprolol succinate ER 25 milliGRAM(s) Oral daily  midodrine. 5 milliGRAM(s) Oral three times a day  mirtazapine 30 milliGRAM(s) Oral at bedtime  ondansetron Injectable 4 milliGRAM(s) IV Push every 8 hours PRN  polyethylene glycol 3350 17 Gram(s) Oral daily  spironolactone 25 milliGRAM(s) Oral daily  tamsulosin 0.4 milliGRAM(s) Oral at bedtime  tiotropium 2.5 MICROgram(s) Inhaler 2 Puff(s) Inhalation daily      Vital Signs Last 24 Hrs  T(C): 36.2 (30 Mar 2023 10:51), Max: 36.7 (30 Mar 2023 04:23)  T(F): 97.1 (30 Mar 2023 10:51), Max: 98 (30 Mar 2023 04:23)  HR: 79 (30 Mar 2023 12:01) (79 - 92)  BP: 97/61 (30 Mar 2023 10:51) (87/51 - 98/66)  BP(mean): --  RR: 17 (30 Mar 2023 10:51) (17 - 18)  SpO2: 96% (30 Mar 2023 12:01) (93% - 97%)    Parameters below as of 30 Mar 2023 12:01  Patient On (Oxygen Delivery Method): nasal cannula,2        Physical Exam:  Constitutional: non-toxic, no distress, on nasal cannula   HEAD/EYES: anicteric, no conjunctival injection  ENT:  supple, no thrush  Cardiovascular:   normal S1, S2, no murmur, +edema  Respiratory:  somewhat coarse breath sounds bilaterally, no wheezes   GI:  soft, normal bowel sounds, obese, PEG was removed, site is dressed c/d/i, not tender   :  no leigh, no CVA tenderness  Musculoskeletal:  no synovitis, difficulty raising legs off the bed  Neurologic: awake and alert to person place time and situation, 3/5 strength in lower extremity bilateral , no focal findings  Skin:  no rash, no erythema, no phlebitis  Heme/Onc: no lymphadenopathy   Psychiatric:  awake, alert, appropriate mood    WBC Count: 7.05 K/uL ( @ 06:52)  WBC Count: 12.66 K/uL ( @ 14:35)  WBC Count: 6.91 K/uL ( @ 05:32)  WBC Count: 5.94 K/uL ( @ 07:00)  WBC Count: 5.64 K/uL ( @ 07:38)  WBC Count: 8.00 K/uL ( @ 07:00)  WBC Count: 11.72 K/uL ( @ 12:15)                            7.9    7.05  )-----------( 199      ( 30 Mar 2023 06:52 )             25.7       0330    138  |  104  |  26<H>  ----------------------------<  80  3.5   |  26  |  1.59<H>    Ca    8.8      30 Mar 2023 06:52  Phos  4.1       Mg     2.2         TPro  6.4  /  Alb  2.3<L>  /  TBili  0.7  /  DBili  x   /  AST  15  /  ALT  18  /  AlkPhos  105  03-30      Urinalysis Basic - ( 30 Mar 2023 11:30 )    Color: Yellow / Appearance: Clear / S.020 / pH: x  Gluc: x / Ketone: Small  / Bili: Negative / Urobili: Negative mg/dL   Blood: x / Protein: 30 mg/dL / Nitrite: Positive   Leuk Esterase: Moderate / RBC: 0-2 /HPF / WBC 11-25   Sq Epi: x / Non Sq Epi: Few / Bacteria: Many        Creatinine Trend: 1.59<--, 1.70<--, 1.45<--, 1.29<--, 1.33<--, 1.23<--  Lactate, Blood: 1.1 mmol/L (23 @ 14:35)      MICROBIOLOGY:  v      Rapid RVP Result: NotDetec ( @ 11:30)      Rapid RVP Result: NotDetec (23 @ 11:30)  SARS-CoV-2: NotDetec (23 @ 11:30)  SARS-CoV-2 Result: NotDetec (23 @ 23:10)    SARS-CoV-2: NotDetec (30 Mar 2023 11:30)    RADIOLOGY:

## 2023-03-30 NOTE — PROGRESS NOTE ADULT - ASSESSMENT
83 y/o male w/ PMHx of CHF w/ ReF, COPD, Prostate Ca, DM type 2, CAD s/p MI Atrial Flutter on Eliquis, [facility] CHF, s/p RT for his Prostate as well, discharged on Life Vest, sent in from  rehab for SOB. Pt admitted for acute on chronic systolic CHF    CARDIOLOGY  # HF w/ ReF  # Acute on chronic systolic CHF  - start Lasix 40mg IV BID  - ECHO with EF 25-30%, cards following, brianne recs  - Strict I/Os, daily wts  - Fluid restriction  - toprol 25 mg po daily, spironolactone 25 mg po daily     # CAD s/p stents  - c/w plavix, statin    # Atrial Flutter  - c/w BB  - c/w Eliquis    HEME/ONC  # Anemia  - Pt w/ hx of Anemia, BL H/H unknown  - no reported BRBPR or melana  - c/w Folic acid, B12    ENDOCRINOLOGY  # DM type 2  - Pt not [facility] med[facility]  - FS qAC and HS w/ SSI    PULMNOLOGY  # COPD  - c/w Inhalers    HEME/ONC  # Prostate Ca  - Pt reports he is s/p 28 day RT  - On Orgovux; nonformulary; pls have family bring in    INFECTIOUS DISEASE  # fever  - 102+ rectal  - suspect 2/2 PEG site infection, s/p PEG removal by surgery   - ID called, c/w Vanc and Cefepime  - f/u CT A/P with gastrografin   - f/u BCx    # DVT ppx - c/w Eliquis    Pt is full code    Dispo: family wants to pt to be transferred [place] [facility] Dr. Rain ([phone]), however transfer on hold due to recurrent fever and hypotension, infectious workup underway. Call Dr. Rain to resume transfer when pt more stable.

## 2023-03-30 NOTE — PROGRESS NOTE ADULT - ASSESSMENT
83 y/o M w/ PMHx of CHF, COPD, Prostate Ca, DM type 2, Atrial Flutter on Eliquis, EMANUEL, recently admitted at the [facility] for 4 weeks for PNA, CHF, s/p RT for his Prostate as well, discharged on Life Vest, sent in from  rehab for SOB.   Febrile with leukocytosis.   CXR from 3/25 (I personally reviewed) with bilateral pleural effusions   Now has IRMA with creatinine rising from 1.2->1.7  Exam with crackles on lung exam as well as purulence and tenderness at the peg site   possible sources of fever are pneumonia vs peg site infection vs viral     3/30: Lung sounds are coarse, no wheezes, has cough, PEG removed yesterday, no fevers within last 24 hrs, NC, WBC normalized, Cr better 1.59, LFTs ok, BCs are pending, CT c/a/p is pending. Will continue with abx, Vancomycin IV and Cefepime IV. RVP negative.   UA with nitrates, many bacteria, WBC 11-25K, will order UC, pt denied any urinary symptoms during today's exam, already on abx.     Plan:  continue vancomycin 15mg/kg   send vancomycin trough with target AUC/CHEO 400-600 - obtain level prior tomorrow's dose (timed order is in)  (therapeutic drug monitoring required with IV vancomycin)   continue cefepime based on renal dosing algorithm   awaiting for Blood cultures x2 - pending   obtain urine culture  RVP negative   Surgical Consult appreciated, PEG removed   awaiting for  CT c/a/p w/o contrast   lactate 1.1  monitor saturation, chest PT   physical therapy  for Diabetes mellitus ISS as he has a good HbA1c and glucose levels have been appropriate here->avoid any hypoglycemic episodes     Discussed with Dr. Greer  Discussed with nursing   Discussed with Dr. Frazier

## 2023-03-30 NOTE — PROGRESS NOTE ADULT - SUBJECTIVE AND OBJECTIVE BOX
Viola Frazier M.D.    Patient is a 82y old  Male who presents with a chief complaint of Acute on Chronic CHF (29 Mar 2023 19:27)      SUBJECTIVE / OVERNIGHT EVENTS: no event overnight.     Patient denies chest pain, SOB, abd pain, N/V, fever, chills, dysuria or any other complaints. All remainder ROS negative.     MEDICATIONS  (STANDING):  albuterol    0.083%. 2.5 milliGRAM(s) Nebulizer once  albuterol/ipratropium for Nebulization 3 milliLiter(s) Nebulizer every 6 hours  apixaban 2.5 milliGRAM(s) Oral two times a day  atorvastatin 80 milliGRAM(s) Oral at bedtime  budesonide 160 MICROgram(s)/formoterol 4.5 MICROgram(s) Inhaler 2 Puff(s) Inhalation two times a day  cefepime   IVPB      cefepime   IVPB 1000 milliGRAM(s) IV Intermittent every 8 hours  clopidogrel Tablet 75 milliGRAM(s) Oral daily  cyanocobalamin 1000 MICROGram(s) Oral daily  dapagliflozin 10 milliGRAM(s) Oral every 24 hours  dextrose 5%. 1000 milliLiter(s) (50 mL/Hr) IV Continuous <Continuous>  dextrose 5%. 1000 milliLiter(s) (100 mL/Hr) IV Continuous <Continuous>  dextrose 50% Injectable 25 Gram(s) IV Push once  dextrose 50% Injectable 12.5 Gram(s) IV Push once  dextrose 50% Injectable 25 Gram(s) IV Push once  diatrizoate meglumine/diatrizoate sodium. 30 milliLiter(s) Oral once  folic acid 1 milliGRAM(s) Oral daily  glucagon  Injectable 1 milliGRAM(s) IntraMuscular once  insulin lispro (ADMELOG) corrective regimen sliding scale   SubCutaneous three times a day before meals  insulin lispro (ADMELOG) corrective regimen sliding scale   SubCutaneous at bedtime  metoprolol succinate ER 25 milliGRAM(s) Oral daily  midodrine. 5 milliGRAM(s) Oral three times a day  mirtazapine 30 milliGRAM(s) Oral at bedtime  polyethylene glycol 3350 17 Gram(s) Oral daily  spironolactone 25 milliGRAM(s) Oral daily  tamsulosin 0.4 milliGRAM(s) Oral at bedtime  tiotropium 2.5 MICROgram(s) Inhaler 2 Puff(s) Inhalation daily  vancomycin  IVPB 1000 milliGRAM(s) IV Intermittent every 24 hours  vancomycin  IVPB        MEDICATIONS  (PRN):  acetaminophen     Tablet .. 650 milliGRAM(s) Oral every 6 hours PRN Temp greater or equal to 38C (100.4F), Mild Pain (1 - 3)  aluminum hydroxide/magnesium hydroxide/simethicone Suspension 30 milliLiter(s) Oral every 4 hours PRN Dyspepsia  dextrose Oral Gel 15 Gram(s) Oral once PRN Blood Glucose LESS THAN 70 milliGRAM(s)/deciliter  melatonin 3 milliGRAM(s) Oral at bedtime PRN Insomnia  ondansetron Injectable 4 milliGRAM(s) IV Push every 8 hours PRN Nausea and/or Vomiting      I&O's Summary    29 Mar 2023 07:01  -  30 Mar 2023 07:00  --------------------------------------------------------  IN: 118 mL / OUT: 950 mL / NET: -832 mL    30 Mar 2023 07:01  -  30 Mar 2023 13:59  --------------------------------------------------------  IN: 220 mL / OUT: 0 mL / NET: 220 mL        PHYSICAL EXAM:  Vital Signs Last 24 Hrs  T(C): 36.2 (30 Mar 2023 10:51), Max: 36.7 (30 Mar 2023 04:23)  T(F): 97.1 (30 Mar 2023 10:51), Max: 98 (30 Mar 2023 04:23)  HR: 79 (30 Mar 2023 12:01) (79 - 92)  BP: 97/61 (30 Mar 2023 10:51) (87/51 - 103/65)  BP(mean): --  RR: 17 (30 Mar 2023 10:51) (17 - 20)  SpO2: 96% (30 Mar 2023 12:01) (93% - 99%)    Parameters below as of 30 Mar 2023 12:01  Patient On (Oxygen Delivery Method): nasal cannula,2    CONSTITUTIONAL: NAD, well-groomed  RESPIRATORY: Normal respiratory effort; lungs are clear to auscultation bilaterally  CARDIOVASCULAR: Regular rate and rhythm; No lower extremity edema  ABDOMEN: Nontender to palpation, normoactive bowel sounds, PEG removed  PSYCH: A+O x3; affect appropriate  NEUROLOGY: CN 2-12 are intact and symmetric; no gross sensory deficits;   SKIN: No rashes; no palpable lesions    LABS:                        7.9    7.05  )-----------( 199      ( 30 Mar 2023 06:52 )             25.7     03-30    138  |  104  |  26<H>  ----------------------------<  80  3.5   |  26  |  1.59<H>    Ca    8.8      30 Mar 2023 06:52  Phos  4.1     03-30  Mg     2.2     03-30    TPro  6.4  /  Alb  2.3<L>  /  TBili  0.7  /  DBili  x   /  AST  15  /  ALT  18  /  AlkPhos  105  03-30          Urinalysis Basic - ( 30 Mar 2023 11:30 )    Color: Yellow / Appearance: Clear / S.020 / pH: x  Gluc: x / Ketone: Small  / Bili: Negative / Urobili: Negative mg/dL   Blood: x / Protein: 30 mg/dL / Nitrite: Positive   Leuk Esterase: Moderate / RBC: 0-2 /HPF / WBC 11-25   Sq Epi: x / Non Sq Epi: Few / Bacteria: Many        CAPILLARY BLOOD GLUCOSE      POCT Blood Glucose.: 85 mg/dL (30 Mar 2023 11:43)  POCT Blood Glucose.: 84 mg/dL (30 Mar 2023 08:22)  POCT Blood Glucose.: 113 mg/dL (29 Mar 2023 22:08)  POCT Blood Glucose.: 169 mg/dL (29 Mar 2023 17:52)      RADIOLOGY & ADDITIONAL TESTS:  Results Reviewed:   Imaging Personally Reviewed:  Electrocardiogram Personally Reviewed:

## 2023-03-30 NOTE — PROGRESS NOTE ADULT - SUBJECTIVE AND OBJECTIVE BOX
Appreciate surgical consult regarding infected PEG site  Peg appears to be anchored in place  CT abd pending      MEDICATIONS  (STANDING):  albuterol    0.083%. 2.5 milliGRAM(s) Nebulizer once  albuterol/ipratropium for Nebulization 3 milliLiter(s) Nebulizer every 6 hours  apixaban 2.5 milliGRAM(s) Oral two times a day  atorvastatin 80 milliGRAM(s) Oral at bedtime  budesonide 160 MICROgram(s)/formoterol 4.5 MICROgram(s) Inhaler 2 Puff(s) Inhalation two times a day  cefepime   IVPB      cefepime   IVPB 1000 milliGRAM(s) IV Intermittent every 8 hours  clopidogrel Tablet 75 milliGRAM(s) Oral daily  cyanocobalamin 1000 MICROGram(s) Oral daily  dapagliflozin 10 milliGRAM(s) Oral every 24 hours  dextrose 5%. 1000 milliLiter(s) (50 mL/Hr) IV Continuous <Continuous>  dextrose 5%. 1000 milliLiter(s) (100 mL/Hr) IV Continuous <Continuous>  dextrose 50% Injectable 25 Gram(s) IV Push once  dextrose 50% Injectable 12.5 Gram(s) IV Push once  dextrose 50% Injectable 25 Gram(s) IV Push once  diatrizoate meglumine/diatrizoate sodium. 30 milliLiter(s) Oral once  folic acid 1 milliGRAM(s) Oral daily  furosemide   Injectable 40 milliGRAM(s) IV Push daily  glucagon  Injectable 1 milliGRAM(s) IntraMuscular once  insulin lispro (ADMELOG) corrective regimen sliding scale   SubCutaneous three times a day before meals  insulin lispro (ADMELOG) corrective regimen sliding scale   SubCutaneous at bedtime  metoprolol succinate ER 25 milliGRAM(s) Oral daily  midodrine. 5 milliGRAM(s) Oral three times a day  mirtazapine 30 milliGRAM(s) Oral at bedtime  polyethylene glycol 3350 17 Gram(s) Oral daily  spironolactone 25 milliGRAM(s) Oral daily  tamsulosin 0.4 milliGRAM(s) Oral at bedtime  tiotropium 2.5 MICROgram(s) Inhaler 2 Puff(s) Inhalation daily  vancomycin  IVPB 1000 milliGRAM(s) IV Intermittent every 24 hours  vancomycin  IVPB        MEDICATIONS  (PRN):  acetaminophen     Tablet .. 650 milliGRAM(s) Oral every 6 hours PRN Temp greater or equal to 38C (100.4F), Mild Pain (1 - 3)  aluminum hydroxide/magnesium hydroxide/simethicone Suspension 30 milliLiter(s) Oral every 4 hours PRN Dyspepsia  dextrose Oral Gel 15 Gram(s) Oral once PRN Blood Glucose LESS THAN 70 milliGRAM(s)/deciliter  melatonin 3 milliGRAM(s) Oral at bedtime PRN Insomnia  ondansetron Injectable 4 milliGRAM(s) IV Push every 8 hours PRN Nausea and/or Vomiting      Allergies    Hytrin (Unknown)  peanuts (Unknown)  rabeprazole (Unknown)  yellow dye (Hives)    Intolerances        Vital Signs Last 24 Hrs  T(C): 36.2 (30 Mar 2023 10:51), Max: 36.7 (30 Mar 2023 04:23)  T(F): 97.1 (30 Mar 2023 10:51), Max: 98 (30 Mar 2023 04:23)  HR: 79 (30 Mar 2023 12:01) (79 - 92)  BP: 97/61 (30 Mar 2023 10:51) (87/51 - 98/66)  BP(mean): --  RR: 17 (30 Mar 2023 10:51) (17 - 18)  SpO2: 96% (30 Mar 2023 12:01) (93% - 97%)    Parameters below as of 30 Mar 2023 12:01  Patient On (Oxygen Delivery Method): nasal cannula,2        PHYSICAL EXAM:  General: NAD.  CVS: S1, S2  Chest: air entry bilaterally present  Abd: BS present, soft, non-tender      LABS:                        7.9    7.05  )-----------( 199      ( 30 Mar 2023 06:52 )             25.7     03-30    138  |  104  |  26<H>  ----------------------------<  80  3.5   |  26  |  1.59<H>    Ca    8.8      30 Mar 2023 06:52  Phos  4.1     03-30  Mg     2.2     03-30    TPro  6.4  /  Alb  2.3<L>  /  TBili  0.7  /  DBili  x   /  AST  15  /  ALT  18  /  AlkPhos  105  03-30        await CT abdomen  will decide about peg removal after CT done

## 2023-03-30 NOTE — PROGRESS NOTE ADULT - SUBJECTIVE AND OBJECTIVE BOX
Patient is a 82y old  Male who presents with a chief complaint of sob    PAST MEDICAL & SURGICAL HISTORY:  Chronic CHF    Prostate cancer s/p radiation     Type 2 diabetes mellitus    Chronic kidney disease (CKD)    EMANUEL on CPAP    HLD (hyperlipidemia)    COPD    CAD s/p PCI    HTN (hypertension)    INTERVAL HISTORY: resting in bed in no acute distress, states he is feeling better compare to yesterday   	  MEDICATIONS:  MEDICATIONS  (STANDING):  albuterol    0.083%. 2.5 milliGRAM(s) Nebulizer once  albuterol/ipratropium for Nebulization 3 milliLiter(s) Nebulizer every 6 hours  apixaban 2.5 milliGRAM(s) Oral two times a day  atorvastatin 80 milliGRAM(s) Oral at bedtime  budesonide 160 MICROgram(s)/formoterol 4.5 MICROgram(s) Inhaler 2 Puff(s) Inhalation two times a day  cefepime   IVPB      cefepime   IVPB 1000 milliGRAM(s) IV Intermittent every 8 hours  clopidogrel Tablet 75 milliGRAM(s) Oral daily  cyanocobalamin 1000 MICROGram(s) Oral daily  dapagliflozin 10 milliGRAM(s) Oral every 24 hours  diatrizoate meglumine/diatrizoate sodium. 30 milliLiter(s) Oral once  folic acid 1 milliGRAM(s) Oral daily  insulin lispro (ADMELOG) corrective regimen sliding scale   SubCutaneous three times a day before meals  insulin lispro (ADMELOG) corrective regimen sliding scale   SubCutaneous at bedtime  metoprolol succinate ER 25 milliGRAM(s) Oral daily  midodrine. 5 milliGRAM(s) Oral three times a day  mirtazapine 30 milliGRAM(s) Oral at bedtime  polyethylene glycol 3350 17 Gram(s) Oral daily  spironolactone 25 milliGRAM(s) Oral daily  tamsulosin 0.4 milliGRAM(s) Oral at bedtime  tiotropium 2.5 MICROgram(s) Inhaler 2 Puff(s) Inhalation daily  vancomycin  IVPB 1000 milliGRAM(s) IV Intermittent every 24 hours  vancomycin  IVPB        MEDICATIONS  (PRN):  acetaminophen     Tablet .. 650 milliGRAM(s) Oral every 6 hours PRN Temp greater or equal to 38C (100.4F), Mild Pain (1 - 3)  aluminum hydroxide/magnesium hydroxide/simethicone Suspension 30 milliLiter(s) Oral every 4 hours PRN Dyspepsia  dextrose Oral Gel 15 Gram(s) Oral once PRN Blood Glucose LESS THAN 70 milliGRAM(s)/deciliter  melatonin 3 milliGRAM(s) Oral at bedtime PRN Insomnia  ondansetron Injectable 4 milliGRAM(s) IV Push every 8 hours PRN Nausea and/or Vomiting    Vitals:  T(F): 97.1 (03-30-23 @ 10:51), Max: 98 (03-30-23 @ 04:23)  HR: 79 (03-30-23 @ 12:01) (79 - 92)  BP: 97/61 (03-30-23 @ 10:51) (87/51 - 103/65)  RR: 17 (03-30-23 @ 10:51) (17 - 20)  SpO2: 96% (03-30-23 @ 12:01) (93% - 99%)    03-29 @ 07:01  -  03-30 @ 07:00  --------------------------------------------------------  IN:    Oral Fluid: 118 mL  Total IN: 118 mL    OUT:    Voided (mL): 950 mL  Total OUT: 950 mL    Total NET: -832 mL    03-30 @ 07:01  -  03-30 @ 13:46  --------------------------------------------------------  IN:    Oral Fluid: 220 mL  Total IN: 220 mL    OUT:  Total OUT: 0 mL    Total NET: 220 mL    PHYSICAL EXAM:  Neuro: Awake, responsive  CV: S1 S2 few rales to Lt base  Lungs: CTABL  GI: Soft, BS +, ND, NT  Extremities: Trace LE edema    TELEMETRY: sinus    RADIOLOGY: < from: Xray Chest 1 View AP/PA. (03.25.23 @ 12:53) >  Bibasilar patchy opacities likely atelectasis. Underlying infection is   not excluded.  Small bilateral pleural effusions    < end of copied text >    DIAGNOSTIC TESTING:    [x ] Echocardiogram:   < from: TTE Echo Complete w/o Contrast w/ Doppler (03.28.23 @ 13:46) >  Left Ventricle: The left ventricle was not well visualized.  Global LV systolic function was severely decreased, on limited views.   Left ventricular ejection fraction, by visual estimation, is 25 to 30%.   Suboptimal endocardial border definition noted, suggest contrast echo for   further evaluation, if clinically warranted.  Right Ventricle: Normal right ventricular size and function.  Left Atrium: The left atrium is normal in size.  Right Atrium: The right atrium is normal in size.  Pericardium: There is no evidence of pericardial effusion.  Mitral Valve: Structurally normal mitral valve, with normal leaflet   excursion. There is mild mitral annular calcification. Mild mitral valve   regurgitation is seen.  Tricuspid Valve: Structurally normal tricuspid valve, with normal leaflet   excursion. Mild tricuspid regurgitation is visualized.  Aortic Valve: Normal trileaflet aortic valve with normal opening. No   evidence of aortic valve regurgitation is seen.  Pulmonic Valve: Structurally normal pulmonic valve, with normal leaflet   excursion. No indication of pulmonicvalve regurgitation.  Aorta: The aortic root and ascending aorta are structurally normal, with   no evidence of dilitation.  Pulmonary Artery: The main pulmonary artery is normal in size.      Summary:   1. Left ventricular ejection fraction, by visual estimation, is 25 to   30%.   2. Suboptimal endocardial border definition noted, suggest contrast echo   for further evaluation, if clinically warranted.   3. Mild mitral annular calcification.   4. Mild mitral valve regurgitation.   5. Mild tricuspid regurgitation.    < end of copied text >    LABS:	 	    30 Mar 2023 06:52    138    |  104    |  26     ----------------------------<  80     3.5     |  26     |  1.59   29 Mar 2023 14:35    136    |  102    |  21     ----------------------------<  111    3.7     |  27     |  1.70   29 Mar 2023 05:32    138    |  106    |  21     ----------------------------<  93     3.6     |  25     |  1.45     Ca    8.8        30 Mar 2023 06:52  Phos  4.1       30 Mar 2023 06:52  Mg     2.2       30 Mar 2023 06:52    TPro  6.4    /  Alb  2.3    /  TBili  0.7    /  DBili  x      /  AST  15     /  ALT  18     /  AlkPhos  105    30 Mar 2023 06:52                        7.9    7.05  )-----------( 199      ( 30 Mar 2023 06:52 )             25.7 ,                       8.4    12.66 )-----------( 246      ( 29 Mar 2023 14:35 )             26.2 ,                       8.4    6.91  )-----------( 241      ( 29 Mar 2023 05:32 )             27.4 ,                       8.2    5.94  )-----------( 212      ( 28 Mar 2023 07:00 )             26.7     TSH: Thyroid Stimulating Hormone, Serum: 4.260 uU/mL (03-28 @ 07:00)

## 2023-03-30 NOTE — CHART NOTE - NSCHARTNOTEFT_GEN_A_CORE
No acute findings on CT. Will sign off.   Recommend daily packing until the skin closes.  Discussed with Dr. Murillo.     < from: CT Abdomen and Pelvis No Cont (03.30.23 @ 19:05) >    FINDINGS:  LOWER CHEST: Please see separate report for CT chest.    LIVER: Within normal limits.  BILE DUCTS: Normal caliber.  GALLBLADDER: Cholecystectomy.  SPLEEN: Within normal limits.  PANCREAS: Marked fatty atrophy.  ADRENALS: Within normal limits.  KIDNEYS/URETERS: Multiple bilateral renal cysts.    BLADDER: Within normal limits.  REPRODUCTIVE ORGANS: Fiducial markers noted in the prostate.    BOWEL: No bowel obstruction. Appendix is normal. Colonic diverticulosis.  PERITONEUM: No ascites.  VESSELS: Lobulated abdominal aortic aneurysm with juxtarenal and   infrarenal components, measuring 3.5 cm. Atherosclerotic changes.  RETROPERITONEUM/LYMPH NODES: No lymphadenopathy.  ABDOMINAL WALL: Within normal limits.  BONES: Bilateral hip and spinal degenerative changes.    IMPRESSION:  No acute findings.  3.5 cm abdominal aortic aneurysm with juxta and infrarenal components.    < end of copied text >

## 2023-03-31 LAB
GLUCOSE BLDC GLUCOMTR-MCNC: 101 MG/DL — HIGH (ref 70–99)
GLUCOSE BLDC GLUCOMTR-MCNC: 101 MG/DL — HIGH (ref 70–99)
GLUCOSE BLDC GLUCOMTR-MCNC: 79 MG/DL — SIGNIFICANT CHANGE UP (ref 70–99)
GLUCOSE BLDC GLUCOMTR-MCNC: 83 MG/DL — SIGNIFICANT CHANGE UP (ref 70–99)
MAGNESIUM SERPL-MCNC: 2.4 MG/DL — SIGNIFICANT CHANGE UP (ref 1.6–2.6)
MRSA PCR RESULT.: SIGNIFICANT CHANGE UP
S AUREUS DNA NOSE QL NAA+PROBE: DETECTED
VANCOMYCIN TROUGH SERPL-MCNC: 12.2 UG/ML — SIGNIFICANT CHANGE UP (ref 10–20)

## 2023-03-31 PROCEDURE — 99233 SBSQ HOSP IP/OBS HIGH 50: CPT

## 2023-03-31 PROCEDURE — 99232 SBSQ HOSP IP/OBS MODERATE 35: CPT | Mod: FS

## 2023-03-31 RX ORDER — MIDODRINE HYDROCHLORIDE 2.5 MG/1
10 TABLET ORAL THREE TIMES A DAY
Refills: 0 | Status: DISCONTINUED | OUTPATIENT
Start: 2023-03-31 | End: 2023-04-04

## 2023-03-31 RX ORDER — SODIUM CHLORIDE 9 MG/ML
1500 INJECTION INTRAMUSCULAR; INTRAVENOUS; SUBCUTANEOUS ONCE
Refills: 0 | Status: COMPLETED | OUTPATIENT
Start: 2023-03-31 | End: 2023-03-31

## 2023-03-31 RX ORDER — FUROSEMIDE 40 MG
40 TABLET ORAL DAILY
Refills: 0 | Status: DISCONTINUED | OUTPATIENT
Start: 2023-03-31 | End: 2023-04-06

## 2023-03-31 RX ADMIN — BUDESONIDE AND FORMOTEROL FUMARATE DIHYDRATE 2 PUFF(S): 160; 4.5 AEROSOL RESPIRATORY (INHALATION) at 05:08

## 2023-03-31 RX ADMIN — ATORVASTATIN CALCIUM 80 MILLIGRAM(S): 80 TABLET, FILM COATED ORAL at 21:45

## 2023-03-31 RX ADMIN — PREGABALIN 1000 MICROGRAM(S): 225 CAPSULE ORAL at 11:47

## 2023-03-31 RX ADMIN — CEFEPIME 100 MILLIGRAM(S): 1 INJECTION, POWDER, FOR SOLUTION INTRAMUSCULAR; INTRAVENOUS at 05:16

## 2023-03-31 RX ADMIN — SODIUM CHLORIDE 100 MILLILITER(S): 9 INJECTION INTRAMUSCULAR; INTRAVENOUS; SUBCUTANEOUS at 11:46

## 2023-03-31 RX ADMIN — Medication 3 MILLILITER(S): at 05:01

## 2023-03-31 RX ADMIN — TAMSULOSIN HYDROCHLORIDE 0.4 MILLIGRAM(S): 0.4 CAPSULE ORAL at 21:45

## 2023-03-31 RX ADMIN — Medication 1 MILLIGRAM(S): at 11:53

## 2023-03-31 RX ADMIN — MIDODRINE HYDROCHLORIDE 5 MILLIGRAM(S): 2.5 TABLET ORAL at 11:48

## 2023-03-31 RX ADMIN — CEFEPIME 100 MILLIGRAM(S): 1 INJECTION, POWDER, FOR SOLUTION INTRAMUSCULAR; INTRAVENOUS at 21:47

## 2023-03-31 RX ADMIN — BUDESONIDE AND FORMOTEROL FUMARATE DIHYDRATE 2 PUFF(S): 160; 4.5 AEROSOL RESPIRATORY (INHALATION) at 17:04

## 2023-03-31 RX ADMIN — Medication 3 MILLILITER(S): at 23:19

## 2023-03-31 RX ADMIN — CLOPIDOGREL BISULFATE 75 MILLIGRAM(S): 75 TABLET, FILM COATED ORAL at 11:48

## 2023-03-31 RX ADMIN — MIRTAZAPINE 30 MILLIGRAM(S): 45 TABLET, ORALLY DISINTEGRATING ORAL at 21:45

## 2023-03-31 RX ADMIN — Medication 3 MILLILITER(S): at 12:44

## 2023-03-31 RX ADMIN — TIOTROPIUM BROMIDE 2 PUFF(S): 18 CAPSULE ORAL; RESPIRATORY (INHALATION) at 11:52

## 2023-03-31 RX ADMIN — MIDODRINE HYDROCHLORIDE 10 MILLIGRAM(S): 2.5 TABLET ORAL at 17:04

## 2023-03-31 RX ADMIN — MIDODRINE HYDROCHLORIDE 5 MILLIGRAM(S): 2.5 TABLET ORAL at 05:07

## 2023-03-31 RX ADMIN — POLYETHYLENE GLYCOL 3350 17 GRAM(S): 17 POWDER, FOR SOLUTION ORAL at 11:48

## 2023-03-31 RX ADMIN — APIXABAN 2.5 MILLIGRAM(S): 2.5 TABLET, FILM COATED ORAL at 05:07

## 2023-03-31 RX ADMIN — CEFEPIME 100 MILLIGRAM(S): 1 INJECTION, POWDER, FOR SOLUTION INTRAMUSCULAR; INTRAVENOUS at 13:15

## 2023-03-31 RX ADMIN — APIXABAN 2.5 MILLIGRAM(S): 2.5 TABLET, FILM COATED ORAL at 17:04

## 2023-03-31 RX ADMIN — Medication 3 MILLILITER(S): at 18:40

## 2023-03-31 RX ADMIN — Medication 250 MILLIGRAM(S): at 16:36

## 2023-03-31 NOTE — PROGRESS NOTE ADULT - ASSESSMENT
81 y/o M w/ PMHx of CHF, COPD, Prostate Ca, DM type 2, Atrial Flutter on Eliquis, EMANUEL, recently admitted at the [facility] for 4 weeks for PNA, CHF, s/p RT for his Prostate as well, discharged on Life Vest, sent in from  rehab for SOB.   Febrile with leukocytosis.   CXR from 3/25 (I personally reviewed) with bilateral pleural effusions   Now has IRMA with creatinine rising from 1.2->1.7  Exam with crackles on lung exam as well as purulence and tenderness at the peg site   possible sources of fever are pneumonia vs peg site infection vs viral     3/30: Lung sounds are coarse, no wheezes, has cough, PEG removed yesterday, no fevers within last 24 hrs, NC, WBC normalized, Cr better 1.59, LFTs ok, BCs are pending, CT c/a/p is pending. Will continue with abx, Vancomycin IV and Cefepime IV. RVP negative.   UA with nitrates, many bacteria, WBC 11-25K, will order UC, pt denied any urinary symptoms during today's exam, already on abx.   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 Vancomycin level was collected, pending result, MRSA PCR performed and pending. If MRSA PCR is negative, ok to stop vancomycin. Cefepime IV continued. Today is day #3 of abx and if BCs will remain with no growth, will limit abx to 7 days total.     Plan:  continue vancomycin 15mg/kg, awaiting for MRSA PCR and if negative, ok to stop vancomycin   send vancomycin trough with target AUC/CHEO 400-600 - collected, pending result   (therapeutic drug monitoring required with IV vancomycin)   continue cefepime based on renal dosing algorithm   follow Blood cultures - NGTD  urine culture - not collected   RVP negative   monitor saturation, chest PT   physical therapy  for Diabetes mellitus ISS as he has a good HbA1c and glucose levels have been appropriate here->avoid any hypoglycemic episodes     Discussed with Dr. Greer  Discussed with nursing

## 2023-03-31 NOTE — PROGRESS NOTE ADULT - SUBJECTIVE AND OBJECTIVE BOX
Viola Frazier M.D.    Patient is a 82y old  Male who presents with a chief complaint of Acute on Chronic CHF (29 Mar 2023 19:27)      SUBJECTIVE / OVERNIGHT EVENTS: no event overnight.     Patient denies chest pain, SOB, abd pain, N/V, fever, chills, dysuria or any other complaints. All remainder ROS negative.     MEDICATIONS  (STANDING):  albuterol    0.083%. 2.5 milliGRAM(s) Nebulizer once  albuterol/ipratropium for Nebulization 3 milliLiter(s) Nebulizer every 6 hours  apixaban 2.5 milliGRAM(s) Oral two times a day  atorvastatin 80 milliGRAM(s) Oral at bedtime  budesonide 160 MICROgram(s)/formoterol 4.5 MICROgram(s) Inhaler 2 Puff(s) Inhalation two times a day  cefepime   IVPB      cefepime   IVPB 1000 milliGRAM(s) IV Intermittent every 8 hours  clopidogrel Tablet 75 milliGRAM(s) Oral daily  cyanocobalamin 1000 MICROGram(s) Oral daily  dapagliflozin 10 milliGRAM(s) Oral every 24 hours  dextrose 5%. 1000 milliLiter(s) (50 mL/Hr) IV Continuous <Continuous>  dextrose 5%. 1000 milliLiter(s) (100 mL/Hr) IV Continuous <Continuous>  dextrose 50% Injectable 25 Gram(s) IV Push once  dextrose 50% Injectable 12.5 Gram(s) IV Push once  dextrose 50% Injectable 25 Gram(s) IV Push once  diatrizoate meglumine/diatrizoate sodium. 30 milliLiter(s) Oral once  folic acid 1 milliGRAM(s) Oral daily  glucagon  Injectable 1 milliGRAM(s) IntraMuscular once  insulin lispro (ADMELOG) corrective regimen sliding scale   SubCutaneous three times a day before meals  insulin lispro (ADMELOG) corrective regimen sliding scale   SubCutaneous at bedtime  metoprolol succinate ER 25 milliGRAM(s) Oral daily  midodrine. 5 milliGRAM(s) Oral three times a day  mirtazapine 30 milliGRAM(s) Oral at bedtime  polyethylene glycol 3350 17 Gram(s) Oral daily  spironolactone 25 milliGRAM(s) Oral daily  tamsulosin 0.4 milliGRAM(s) Oral at bedtime  tiotropium 2.5 MICROgram(s) Inhaler 2 Puff(s) Inhalation daily  vancomycin  IVPB 1000 milliGRAM(s) IV Intermittent every 24 hours  vancomycin  IVPB        MEDICATIONS  (PRN):  acetaminophen     Tablet .. 650 milliGRAM(s) Oral every 6 hours PRN Temp greater or equal to 38C (100.4F), Mild Pain (1 - 3)  aluminum hydroxide/magnesium hydroxide/simethicone Suspension 30 milliLiter(s) Oral every 4 hours PRN Dyspepsia  dextrose Oral Gel 15 Gram(s) Oral once PRN Blood Glucose LESS THAN 70 milliGRAM(s)/deciliter  melatonin 3 milliGRAM(s) Oral at bedtime PRN Insomnia  ondansetron Injectable 4 milliGRAM(s) IV Push every 8 hours PRN Nausea and/or Vomiting      I&O's Summary    29 Mar 2023 07:01  -  30 Mar 2023 07:00  --------------------------------------------------------  IN: 118 mL / OUT: 950 mL / NET: -832 mL    30 Mar 2023 07:01  -  30 Mar 2023 13:59  --------------------------------------------------------  IN: 220 mL / OUT: 0 mL / NET: 220 mL        PHYSICAL EXAM:  Vital Signs Last 24 Hrs  T(C): 36.8 (31 Mar 2023 11:17), Max: 37.2 (30 Mar 2023 23:49)  T(F): 98.2 (31 Mar 2023 11:17), Max: 99 (30 Mar 2023 23:49)  HR: 89 (31 Mar 2023 11:17) (80 - 102)  BP: 85/53 (31 Mar 2023 11:17) (85/53 - 105/67)  BP(mean): 70 (30 Mar 2023 22:06) (70 - 70)  RR: 18 (31 Mar 2023 11:17) (16 - 18)  SpO2: 96% (31 Mar 2023 11:17) (93% - 97%)    Parameters below as of 31 Mar 2023 11:17  Patient On (Oxygen Delivery Method): nasal cannula        CONSTITUTIONAL: NAD, well-groomed  RESPIRATORY: Normal respiratory effort; lungs are clear to auscultation bilaterally  CARDIOVASCULAR: Regular rate and rhythm; No lower extremity edema  ABDOMEN: Nontender to palpation, normoactive bowel sounds, PEG removed  PSYCH: A+O x3; affect appropriate  NEUROLOGY: CN 2-12 are intact and symmetric; no gross sensory deficits;   SKIN: No rashes; no palpable lesions    LABS:                              7.9    7.05  )-----------( 199      ( 30 Mar 2023 06:52 )             25.7     03-30    138  |  104  |  26<H>  ----------------------------<  80  3.5   |  26  |  1.59<H>    Ca    8.8      30 Mar 2023 06:52  Phos  4.1     -  Mg     2.4     -    TPro  6.4  /  Alb  2.3<L>  /  TBili  0.7  /  DBili  x   /  AST  15  /  ALT  18  /  AlkPhos  105  03-30      Urinalysis Basic - ( 30 Mar 2023 11:30 )    Color: Yellow / Appearance: Clear / S.020 / pH: x  Gluc: x / Ketone: Small  / Bili: Negative / Urobili: Negative mg/dL   Blood: x / Protein: 30 mg/dL / Nitrite: Positive   Leuk Esterase: Moderate / RBC: 0-2 /HPF / WBC 11-25   Sq Epi: x / Non Sq Epi: Few / Bacteria: Many        RADIOLOGY & ADDITIONAL TESTS:  Results Reviewed:   Imaging Personally Reviewed:  Electrocardiogram Personally Reviewed: Ear Star Wedge Flap Text: The defect edges were debeveled with a #15 blade scalpel.  Given the location of the defect and the proximity to free margins (helical rim) an ear star wedge flap was deemed most appropriate.  Using a sterile surgical marker, the appropriate flap was drawn incorporating the defect and placing the expected incisions between the helical rim and antihelix where possible.  The area thus outlined was incised through and through with a #15 scalpel blade.

## 2023-03-31 NOTE — PROGRESS NOTE ADULT - ASSESSMENT
83 yo male with likely history of ischemic cardiomyopathy. Also chr AF, CAD s/p PCI 3 years ago, DM2, COPD.  [facility], FTT.  Here for acute dyspnea x one day and productive cough, likely sec to exacerbation of CHF.  No evidence of acute ischemia, but cath is reportedly pending at [facility].  Is on life vest; not wearing at present, patient needs education regarding its use prior to discharge, Rebecca Vicente was informed and will contact the Rep prior to discharge  repeat TTE with EF 25-30%    Pt with c/o diffused abd discomfort with Temp of 102.6 R on 3/29 , PEG site found to be inflamed, indurated, Tube removed, started on ABx     Plan:  Telemetry monitoring  Continue to IV Lasix daily   monitor renal function, electrolytes and daily weights.  Optimize GDMT, currently low BP precludes adding Entresto, bbl /aldactone doses held 2/2 low BP, currently on midodrine for BP support   Symptoms likely worsened by significant anemia, etiology? On Plavix as well as DOAC, this may be chronic anemia w/u as per primary team   sepsis w/u, PEG tube removed, ABx as per ID  [facility] [place], transfer process has been started by medicine team but due to new fever, Dr Rain from [facility] recommended workup and ensuring clinical stability prior to transfer.   If no transfer occurs and pt is getting discharged from here, pt and family should be educated on use of Life Vest prior to discharge, Rebecca Hernandez can be reached at  for retraining.

## 2023-03-31 NOTE — PROGRESS NOTE ADULT - ASSESSMENT
83 y/o male w/ PMHx of CHF w/ ReF, COPD, Prostate Ca, DM type 2, CAD s/p MI Atrial Flutter on Eliquis, [facility], CHF, s/p RT for his Prostate as well, discharged on Life Vest, sent in from  rehab for SOB. Pt admitted for acute on chronic systolic CHF      # HF w/ ReF  # Acute on chronic systolic CHF  - start Lasix 40mg IV BID  - ECHO with EF 25-30%, cards following, brianne recs  - Strict I/Os, daily wts  - Fluid restriction  - toprol 25 mg po daily, spironolactone 25 mg po daily     # CAD s/p stents  - c/w plavix, statin    # Atrial Flutter  - c/w BB  - c/w Eliquis      # Anemia  - Pt w/ hx of Anemia, BL H/H unknown  - no reported BRBPR or melana  - c/w Folic acid, B12    # DM type 2  - Pt not on any meds per [facility]  - FS qAC and HS w/ SSI      # COPD  - c/w Inhalers      # Prostate Ca  - Pt reports he is s/p 28 day RT  - On Orgovux; nonformulary; pls have family bring in      # fever with hypotension   - 102+ rectal  - suspect 2/2 PEG site infection, s/p PEG removal by surgery   - ID called, c/w Vanc and Cefepime  - f/u BCx  - judicious fluids   - increase  midodrine     # DVT ppx - c/w Eliquis    Pt is full code    Dispo: family wants to pt to be transferred to [facility] Dr. Rain ([phone]), however transfer on hold due to recurrent fever and hypotension, infectious workup underway. Call Dr. Rain to resume transfer when pt more stable.

## 2023-03-31 NOTE — GOALS OF CARE CONVERSATION - ADVANCED CARE PLANNING - CONVERSATION DETAILS
I spoke with Mr Yu at bedside he is a pleasant male 88 yo pmh of hfref, ,copd, prostate cancer s/p RT which finished recently.  new onset a flutter diabetes mellitus  ,   Pt Is alert and orientated  x 4  Mr Chavis is alert and has good knowledge of his present medical conmdition  he states his son Pedrito is his HCP  he has been intubated X2 in the last year and if needed he would request he be intubated again  he expressed wish to have CPR if needed  He would like feeding tube if required and safe to place I spoke with Mr Yu at bedside he is a pleasant male 86 yo pmh of hfref, ,copd, prostate cancer s/p RT which finished recently.  new onset a flutter diabetes mellitus  ,   Pt Is alert and orientated  x 4  Mr Chavis is alert and has good knowledge of his present medical conmdition  he states his son Pedrito is his [phone]  he has been intubated X2 in the last year and if needed he would request he be intubated again  he expressed wish to have CPR if needed  He would like feeding tube if required and safe to place

## 2023-03-31 NOTE — PROGRESS NOTE ADULT - SUBJECTIVE AND OBJECTIVE BOX
Pt feeling better - peg tube removed yesterday  No abd pain  on IV antibx  Tm 99 today    MEDICATIONS  (STANDING):  albuterol    0.083%. 2.5 milliGRAM(s) Nebulizer once  albuterol/ipratropium for Nebulization 3 milliLiter(s) Nebulizer every 6 hours  apixaban 2.5 milliGRAM(s) Oral two times a day  atorvastatin 80 milliGRAM(s) Oral at bedtime  budesonide 160 MICROgram(s)/formoterol 4.5 MICROgram(s) Inhaler 2 Puff(s) Inhalation two times a day  cefepime   IVPB 1000 milliGRAM(s) IV Intermittent every 8 hours  cefepime   IVPB      clopidogrel Tablet 75 milliGRAM(s) Oral daily  cyanocobalamin 1000 MICROGram(s) Oral daily  dapagliflozin 10 milliGRAM(s) Oral every 24 hours  dextrose 5%. 1000 milliLiter(s) (50 mL/Hr) IV Continuous <Continuous>  dextrose 5%. 1000 milliLiter(s) (100 mL/Hr) IV Continuous <Continuous>  dextrose 50% Injectable 25 Gram(s) IV Push once  dextrose 50% Injectable 12.5 Gram(s) IV Push once  dextrose 50% Injectable 25 Gram(s) IV Push once  folic acid 1 milliGRAM(s) Oral daily  furosemide   Injectable 40 milliGRAM(s) IV Push daily  glucagon  Injectable 1 milliGRAM(s) IntraMuscular once  insulin lispro (ADMELOG) corrective regimen sliding scale   SubCutaneous three times a day before meals  insulin lispro (ADMELOG) corrective regimen sliding scale   SubCutaneous at bedtime  metoprolol succinate ER 25 milliGRAM(s) Oral daily  midodrine. 5 milliGRAM(s) Oral three times a day  mirtazapine 30 milliGRAM(s) Oral at bedtime  polyethylene glycol 3350 17 Gram(s) Oral daily  spironolactone 25 milliGRAM(s) Oral daily  tamsulosin 0.4 milliGRAM(s) Oral at bedtime  tiotropium 2.5 MICROgram(s) Inhaler 2 Puff(s) Inhalation daily  vancomycin  IVPB      vancomycin  IVPB 1000 milliGRAM(s) IV Intermittent every 24 hours    MEDICATIONS  (PRN):  acetaminophen     Tablet .. 650 milliGRAM(s) Oral every 6 hours PRN Temp greater or equal to 38C (100.4F), Mild Pain (1 - 3)  aluminum hydroxide/magnesium hydroxide/simethicone Suspension 30 milliLiter(s) Oral every 4 hours PRN Dyspepsia  dextrose Oral Gel 15 Gram(s) Oral once PRN Blood Glucose LESS THAN 70 milliGRAM(s)/deciliter  melatonin 3 milliGRAM(s) Oral at bedtime PRN Insomnia  ondansetron Injectable 4 milliGRAM(s) IV Push every 8 hours PRN Nausea and/or Vomiting      Allergies    Hytrin (Unknown)  peanuts (Unknown)  rabeprazole (Unknown)  yellow dye (Hives)    Intolerances        Vital Signs Last 24 Hrs  T(C): 36.3 (31 Mar 2023 04:57), Max: 37.2 (30 Mar 2023 23:49)  T(F): 97.3 (31 Mar 2023 04:57), Max: 99 (30 Mar 2023 23:49)  HR: 86 (31 Mar 2023 09:49) (79 - 102)  BP: 94/59 (31 Mar 2023 04:57) (91/60 - 105/67)  BP(mean): 70 (30 Mar 2023 22:06) (70 - 70)  RR: 18 (31 Mar 2023 04:57) (16 - 18)  SpO2: 96% (31 Mar 2023 09:49) (93% - 97%)    Parameters below as of 31 Mar 2023 05:01  Patient On (Oxygen Delivery Method): nasal cannula        PHYSICAL EXAM:  General: NAD.  CVS: S1, S2  Chest: air entry bilaterally present  Abd: BS present, soft, non-tender      LABS:                        7.9    7.05  )-----------( 199      ( 30 Mar 2023 06:52 )             25.7     03-30    138  |  104  |  26<H>  ----------------------------<  80  3.5   |  26  |  1.59<H>    Ca    8.8      30 Mar 2023 06:52  Phos  4.1     03-30  Mg     2.4     03-31    TPro  6.4  /  Alb  2.3<L>  /  TBili  0.7  /  DBili  x   /  AST  15  /  ALT  18  /  AlkPhos  105  03-30      continue antibx  diet as tolerated

## 2023-03-31 NOTE — PROGRESS NOTE ADULT - SUBJECTIVE AND OBJECTIVE BOX
Patient is a 82y old  Male who presents with a chief complaint of Acute on Chronic CHF (31 Mar 2023 10:15)      PAST MEDICAL & SURGICAL HISTORY:  Chronic CHF      Prostate cancer      Type 2 diabetes mellitus      Chronic kidney disease (CKD)      EMANUEL on CPAP      HLD (hyperlipidemia)      History of COPD      HTN (hypertension)      No significant past surgical history      INTERVAL HISTORY: Patient in bed, no c/o chest pain or SOB. Patient requesting to go the [facility].  	  MEDICATIONS:  MEDICATIONS  (STANDING):  albuterol    0.083%. 2.5 milliGRAM(s) Nebulizer once  albuterol/ipratropium for Nebulization 3 milliLiter(s) Nebulizer every 6 hours  apixaban 2.5 milliGRAM(s) Oral two times a day  atorvastatin 80 milliGRAM(s) Oral at bedtime  budesonide 160 MICROgram(s)/formoterol 4.5 MICROgram(s) Inhaler 2 Puff(s) Inhalation two times a day  cefepime   IVPB 1000 milliGRAM(s) IV Intermittent every 8 hours  cefepime   IVPB      clopidogrel Tablet 75 milliGRAM(s) Oral daily  cyanocobalamin 1000 MICROGram(s) Oral daily  dapagliflozin 10 milliGRAM(s) Oral every 24 hours  dextrose 5%. 1000 milliLiter(s) (50 mL/Hr) IV Continuous <Continuous>  dextrose 5%. 1000 milliLiter(s) (100 mL/Hr) IV Continuous <Continuous>  dextrose 50% Injectable 25 Gram(s) IV Push once  dextrose 50% Injectable 12.5 Gram(s) IV Push once  dextrose 50% Injectable 25 Gram(s) IV Push once  folic acid 1 milliGRAM(s) Oral daily  furosemide   Injectable 40 milliGRAM(s) IV Push daily  glucagon  Injectable 1 milliGRAM(s) IntraMuscular once  insulin lispro (ADMELOG) corrective regimen sliding scale   SubCutaneous three times a day before meals  insulin lispro (ADMELOG) corrective regimen sliding scale   SubCutaneous at bedtime  metoprolol succinate ER 25 milliGRAM(s) Oral daily  midodrine. 5 milliGRAM(s) Oral three times a day  mirtazapine 30 milliGRAM(s) Oral at bedtime  polyethylene glycol 3350 17 Gram(s) Oral daily  sodium chloride 0.9% Bolus 1500 milliLiter(s) IV Bolus once  spironolactone 25 milliGRAM(s) Oral daily  tamsulosin 0.4 milliGRAM(s) Oral at bedtime  tiotropium 2.5 MICROgram(s) Inhaler 2 Puff(s) Inhalation daily  vancomycin  IVPB      vancomycin  IVPB 1000 milliGRAM(s) IV Intermittent every 24 hours    MEDICATIONS  (PRN):  acetaminophen     Tablet .. 650 milliGRAM(s) Oral every 6 hours PRN Temp greater or equal to 38C (100.4F), Mild Pain (1 - 3)  aluminum hydroxide/magnesium hydroxide/simethicone Suspension 30 milliLiter(s) Oral every 4 hours PRN Dyspepsia  dextrose Oral Gel 15 Gram(s) Oral once PRN Blood Glucose LESS THAN 70 milliGRAM(s)/deciliter  melatonin 3 milliGRAM(s) Oral at bedtime PRN Insomnia  ondansetron Injectable 4 milliGRAM(s) IV Push every 8 hours PRN Nausea and/or Vomiting      Vitals:  T(F): 98.2 (03-31-23 @ 11:17), Max: 99 (03-30-23 @ 23:49)  HR: 89 (03-31-23 @ 11:17) (79 - 102)  BP: 94/59 (03-31-23 @ 04:57) (91/60 - 105/67)  RR: 18 (03-31-23 @ 11:17) (16 - 18)  SpO2: 96% (03-31-23 @ 11:17) (93% - 97%)  Wt(kg): --    03-30 @ 07:01  -  03-31 @ 07:00  --------------------------------------------------------  IN:    Oral Fluid: 220 mL  Total IN: 220 mL    OUT:  Total OUT: 0 mL    Total NET: 220 mL      03-31 @ 07:01  -  03-31 @ 11:46  --------------------------------------------------------  IN:    Oral Fluid: 300 mL  Total IN: 300 mL    OUT:  Total OUT: 0 mL    Total NET: 300 mL      PHYSICAL EXAM:  Neuro: Awake, responsive  CV: S1 S2 RRR  Lungs: CTABL  GI: Peg site dressing, Soft, BS +, ND, NT  Extremities: No edema    TELEMETRY: SR  	    RADIOLOGY:   < from: CT Chest No Cont (03.30.23 @ 19:05) >  FINDINGS:    LUNGS/AIRWAYS/PLEURA: Occluded left lower lobar bronchus, likely by   mucus. Emphysema. Small branching and clustered nodules in the left upper   lobe compatible with bronchiolar impaction. Left lower lobe   consolidation. Small pleural effusions and associated passive atelectasis   of the left lower lobe.    LYMPH NODES/MEDIASTINUM: No enlarged lymph nodes.    HEART/VASCULATURE: Normal heart size. Small pericardial effusion.   Coronary artery calcifications. 4 cm ascending aorta.    UPPER ABDOMEN: Cholecystectomy. Right renal cysts and lesions in both   kidneys that are incompletely characterized.    BONES/SOFT TISSUES: Unremarkable.      IMPRESSION:    Left lower lobe consolidation, likely pneumonia. Recommend follow-up in   6-8 weeks to assess for improvement.    Small pleural effusions.      < end of copied text >    DIAGNOSTIC TESTING:    [x ] Echocardiogram:   < from: TTE Echo Complete w/o Contrast w/ Doppler (03.28.23 @ 13:46) >  Summary:   1. Left ventricular ejection fraction, by visual estimation, is 25 to   30%.   2. Suboptimal endocardial border definition noted, suggest contrast echo   for further evaluation, if clinically warranted.   3. Mild mitral annular calcification.   4. Mild mitral valve regurgitation.   5. Mild tricuspid regurgitation.    < end of copied text >    [ ] Cardiac Catheterization:  [ ] Stress Test:      LABS:	 	    CARDIAC MARKERS:      30 Mar 2023 06:52    138    |  104    |  26     ----------------------------<  80     3.5     |  26     |  1.59   29 Mar 2023 14:35    136    |  102    |  21     ----------------------------<  111    3.7     |  27     |  1.70   29 Mar 2023 05:32    138    |  106    |  21     ----------------------------<  93     3.6     |  25     |  1.45     Ca    8.8        30 Mar 2023 06:52  Phos  4.1       30 Mar 2023 06:52  Mg     2.4       31 Mar 2023 07:20    TPro  6.4    /  Alb  2.3    /  TBili  0.7    /  DBili  x      /  AST  15     /  ALT  18     /  AlkPhos  105    30 Mar 2023 06:52                          7.9    7.05  )-----------( 199      ( 30 Mar 2023 06:52 )             25.7 ,                       8.4    12.66 )-----------( 246      ( 29 Mar 2023 14:35 )             26.2 ,                       8.4    6.91  )-----------( 241      ( 29 Mar 2023 05:32 )             27.4   proBNP:   Lipid Profile:   HgA1c:   TSH: Thyroid Stimulating Hormone, Serum: 4.260 uU/mL (03-28 @ 07:00)

## 2023-03-31 NOTE — PROGRESS NOTE ADULT - SUBJECTIVE AND OBJECTIVE BOX
KENDRICK WEBER  MRN-[id number]    Follow Up:  pna    Interval History: the pt was seen and examined earlier, no acute distress, still with poor appetite. Pt is afebrile, on RA during my visit, comfortable.     PAST MEDICAL & SURGICAL HISTORY:  Chronic CHF      Prostate cancer      Type 2 diabetes mellitus      Chronic kidney disease (CKD)      EMANUEL on CPAP      HLD (hyperlipidemia)      History of COPD      HTN (hypertension)      No significant past surgical history          ROS:    [ ] Unobtainable because:  [x ] All other systems negative    Constitutional: no fever, no chills  Head: no trauma  Eyes: no vision changes, no eye pain  ENT:  no sore throat, no rhinorrhea  Cardiovascular:  no chest pain, no palpitation  Respiratory:  no SOB, + cough  GI:  no abd pain, no vomiting, no diarrhea  urinary: no dysuria, no hematuria, no flank pain  musculoskeletal:  no joint pain, no joint swelling  skin:  no rash  neurology:  no headache, no seizure, no change in mental status  psych: no anxiety, no depression         Allergies  Hytrin (Unknown)  peanuts (Unknown)  rabeprazole (Unknown)  yellow dye (Hives)        ANTIMICROBIALS:  cefepime   IVPB 1000 every 8 hours  cefepime   IVPB    vancomycin  IVPB    vancomycin  IVPB 1000 every 24 hours      OTHER MEDS:  acetaminophen     Tablet .. 650 milliGRAM(s) Oral every 6 hours PRN  albuterol    0.083%. 2.5 milliGRAM(s) Nebulizer once  albuterol/ipratropium for Nebulization 3 milliLiter(s) Nebulizer every 6 hours  aluminum hydroxide/magnesium hydroxide/simethicone Suspension 30 milliLiter(s) Oral every 4 hours PRN  apixaban 2.5 milliGRAM(s) Oral two times a day  atorvastatin 80 milliGRAM(s) Oral at bedtime  budesonide 160 MICROgram(s)/formoterol 4.5 MICROgram(s) Inhaler 2 Puff(s) Inhalation two times a day  clopidogrel Tablet 75 milliGRAM(s) Oral daily  cyanocobalamin 1000 MICROGram(s) Oral daily  dapagliflozin 10 milliGRAM(s) Oral every 24 hours  dextrose 5%. 1000 milliLiter(s) IV Continuous <Continuous>  dextrose 5%. 1000 milliLiter(s) IV Continuous <Continuous>  dextrose 50% Injectable 25 Gram(s) IV Push once  dextrose 50% Injectable 12.5 Gram(s) IV Push once  dextrose 50% Injectable 25 Gram(s) IV Push once  dextrose Oral Gel 15 Gram(s) Oral once PRN  folic acid 1 milliGRAM(s) Oral daily  furosemide    Tablet 40 milliGRAM(s) Oral daily  glucagon  Injectable 1 milliGRAM(s) IntraMuscular once  insulin lispro (ADMELOG) corrective regimen sliding scale   SubCutaneous three times a day before meals  insulin lispro (ADMELOG) corrective regimen sliding scale   SubCutaneous at bedtime  melatonin 3 milliGRAM(s) Oral at bedtime PRN  metoprolol succinate ER 25 milliGRAM(s) Oral daily  midodrine. 10 milliGRAM(s) Oral three times a day  mirtazapine 30 milliGRAM(s) Oral at bedtime  ondansetron Injectable 4 milliGRAM(s) IV Push every 8 hours PRN  polyethylene glycol 3350 17 Gram(s) Oral daily  spironolactone 25 milliGRAM(s) Oral daily  tamsulosin 0.4 milliGRAM(s) Oral at bedtime  tiotropium 2.5 MICROgram(s) Inhaler 2 Puff(s) Inhalation daily      Vital Signs Last 24 Hrs  T(C): 36.8 (31 Mar 2023 11:17), Max: 37.2 (30 Mar 2023 23:49)  T(F): 98.2 (31 Mar 2023 11:17), Max: 99 (30 Mar 2023 23:49)  HR: 89 (31 Mar 2023 11:17) (80 - 102)  BP: 89/56 (31 Mar 2023 13:45) (85/53 - 105/67)  BP(mean): 70 (30 Mar 2023 22:06) (70 - 70)  RR: 18 (31 Mar 2023 11:17) (16 - 18)  SpO2: 96% (31 Mar 2023 11:17) (93% - 97%)    Parameters below as of 31 Mar 2023 11:17  Patient On (Oxygen Delivery Method): nasal cannula        Physical Exam:  Constitutional: non-toxic, no distress, on RA and comfortable   HEAD/EYES: anicteric, no conjunctival injection  ENT:  supple, no thrush  Cardiovascular:   normal S1, S2, no murmur, +edema  Respiratory: diminished breath sounds bilaterally, no wheezes   GI:  soft, normal bowel sounds, obese, PEG was removed, site is clean, no noted erythema today, no drainage from the opening.   :  no leigh, no CVA tenderness  Musculoskeletal:  no synovitis, difficulty raising legs off the bed  Neurologic: awake and alert to person place time and situation, 3/5 strength in lower extremity bilateral , no focal findings  Skin:  no rash, no erythema, no phlebitis  Heme/Onc: no lymphadenopathy   Psychiatric:  awake, alert, appropriate mood    WBC Count: 7.05 K/uL ( @ 06:52)  WBC Count: 12.66 K/uL ( @ 14:35)  WBC Count: 6.91 K/uL ( @ 05:32)  WBC Count: 5.94 K/uL ( @ 07:00)  WBC Count: 5.64 K/uL ( @ 07:38)  WBC Count: 8.00 K/uL ( @ 07:00)  WBC Count: 11.72 K/uL ( @ 12:15)                            7.9    7.05  )-----------( 199      ( 30 Mar 2023 06:52 )             25.7           138  |  104  |  26<H>  ----------------------------<  80  3.5   |  26  |  1.59<H>    Ca    8.8      30 Mar 2023 06:52  Phos  4.1       Mg     2.4         TPro  6.4  /  Alb  2.3<L>  /  TBili  0.7  /  DBili  x   /  AST  15  /  ALT  18  /  AlkPhos  105        Urinalysis Basic - ( 30 Mar 2023 11:30 )    Color: Yellow / Appearance: Clear / S.020 / pH: x  Gluc: x / Ketone: Small  / Bili: Negative / Urobili: Negative mg/dL   Blood: x / Protein: 30 mg/dL / Nitrite: Positive   Leuk Esterase: Moderate / RBC: 0-2 /HPF / WBC 11-25   Sq Epi: x / Non Sq Epi: Few / Bacteria: Many        Creatinine Trend: 1.59<--, 1.70<--, 1.45<--, 1.29<--, 1.33<--, 1.23<--      MICROBIOLOGY:  v  .Blood Blood-Peripheral  23   No growth to date.  --  --      .Blood Blood-Peripheral  23   No growth to date.  --  --    Rapid RVP Result: NotDetec ( @ 11:30)    Procalcitonin, Serum: 2.42 (23 @ 06:52)    Rapid RVP Result: NotDetec (23 @ 11:30)  SARS-CoV-2: NotDetec (23 @ 11:30)    SARS-CoV-2: NotDetec (30 Mar 2023 11:30)    RADIOLOGY:

## 2023-04-01 LAB
ANION GAP SERPL CALC-SCNC: 6 MMOL/L — SIGNIFICANT CHANGE UP (ref 5–17)
BUN SERPL-MCNC: 17 MG/DL — SIGNIFICANT CHANGE UP (ref 7–23)
CALCIUM SERPL-MCNC: 8.3 MG/DL — LOW (ref 8.5–10.1)
CHLORIDE SERPL-SCNC: 107 MMOL/L — SIGNIFICANT CHANGE UP (ref 96–108)
CO2 SERPL-SCNC: 25 MMOL/L — SIGNIFICANT CHANGE UP (ref 22–31)
CREAT SERPL-MCNC: 1.16 MG/DL — SIGNIFICANT CHANGE UP (ref 0.5–1.3)
EGFR: 64 ML/MIN/1.73M2 — SIGNIFICANT CHANGE UP
GLUCOSE BLDC GLUCOMTR-MCNC: 116 MG/DL — HIGH (ref 70–99)
GLUCOSE BLDC GLUCOMTR-MCNC: 138 MG/DL — HIGH (ref 70–99)
GLUCOSE BLDC GLUCOMTR-MCNC: 80 MG/DL — SIGNIFICANT CHANGE UP (ref 70–99)
GLUCOSE BLDC GLUCOMTR-MCNC: 95 MG/DL — SIGNIFICANT CHANGE UP (ref 70–99)
GLUCOSE SERPL-MCNC: 76 MG/DL — SIGNIFICANT CHANGE UP (ref 70–99)
HCT VFR BLD CALC: 24.5 % — LOW (ref 39–50)
HGB BLD-MCNC: 7.5 G/DL — LOW (ref 13–17)
MCHC RBC-ENTMCNC: 30.6 G/DL — LOW (ref 32–36)
MCHC RBC-ENTMCNC: 32.6 PG — SIGNIFICANT CHANGE UP (ref 27–34)
MCV RBC AUTO: 106.5 FL — HIGH (ref 80–100)
NRBC # BLD: 0 /100 WBCS — SIGNIFICANT CHANGE UP (ref 0–0)
PLATELET # BLD AUTO: 183 K/UL — SIGNIFICANT CHANGE UP (ref 150–400)
POTASSIUM SERPL-MCNC: 3.3 MMOL/L — LOW (ref 3.5–5.3)
POTASSIUM SERPL-SCNC: 3.3 MMOL/L — LOW (ref 3.5–5.3)
RBC # BLD: 2.3 M/UL — LOW (ref 4.2–5.8)
RBC # FLD: 16.7 % — HIGH (ref 10.3–14.5)
SODIUM SERPL-SCNC: 138 MMOL/L — SIGNIFICANT CHANGE UP (ref 135–145)
VANCOMYCIN TROUGH SERPL-MCNC: 7.4 UG/ML — LOW (ref 10–20)
WBC # BLD: 5.26 K/UL — SIGNIFICANT CHANGE UP (ref 3.8–10.5)
WBC # FLD AUTO: 5.26 K/UL — SIGNIFICANT CHANGE UP (ref 3.8–10.5)

## 2023-04-01 PROCEDURE — 76937 US GUIDE VASCULAR ACCESS: CPT | Mod: 26,59

## 2023-04-01 PROCEDURE — 99232 SBSQ HOSP IP/OBS MODERATE 35: CPT

## 2023-04-01 PROCEDURE — 36569 INSJ PICC 5 YR+ W/O IMAGING: CPT

## 2023-04-01 RX ORDER — POTASSIUM CHLORIDE 20 MEQ
40 PACKET (EA) ORAL ONCE
Refills: 0 | Status: COMPLETED | OUTPATIENT
Start: 2023-04-01 | End: 2023-04-01

## 2023-04-01 RX ADMIN — BUDESONIDE AND FORMOTEROL FUMARATE DIHYDRATE 2 PUFF(S): 160; 4.5 AEROSOL RESPIRATORY (INHALATION) at 17:40

## 2023-04-01 RX ADMIN — CLOPIDOGREL BISULFATE 75 MILLIGRAM(S): 75 TABLET, FILM COATED ORAL at 12:54

## 2023-04-01 RX ADMIN — CEFEPIME 100 MILLIGRAM(S): 1 INJECTION, POWDER, FOR SOLUTION INTRAMUSCULAR; INTRAVENOUS at 14:07

## 2023-04-01 RX ADMIN — Medication 40 MILLIEQUIVALENT(S): at 17:48

## 2023-04-01 RX ADMIN — DAPAGLIFLOZIN 10 MILLIGRAM(S): 10 TABLET, FILM COATED ORAL at 00:05

## 2023-04-01 RX ADMIN — MIRTAZAPINE 30 MILLIGRAM(S): 45 TABLET, ORALLY DISINTEGRATING ORAL at 22:15

## 2023-04-01 RX ADMIN — PREGABALIN 1000 MICROGRAM(S): 225 CAPSULE ORAL at 12:53

## 2023-04-01 RX ADMIN — TAMSULOSIN HYDROCHLORIDE 0.4 MILLIGRAM(S): 0.4 CAPSULE ORAL at 22:15

## 2023-04-01 RX ADMIN — Medication 3 MILLILITER(S): at 05:59

## 2023-04-01 RX ADMIN — MIDODRINE HYDROCHLORIDE 10 MILLIGRAM(S): 2.5 TABLET ORAL at 17:41

## 2023-04-01 RX ADMIN — MIDODRINE HYDROCHLORIDE 10 MILLIGRAM(S): 2.5 TABLET ORAL at 12:54

## 2023-04-01 RX ADMIN — TIOTROPIUM BROMIDE 2 PUFF(S): 18 CAPSULE ORAL; RESPIRATORY (INHALATION) at 12:53

## 2023-04-01 RX ADMIN — Medication 1 MILLIGRAM(S): at 12:53

## 2023-04-01 RX ADMIN — MIDODRINE HYDROCHLORIDE 10 MILLIGRAM(S): 2.5 TABLET ORAL at 05:47

## 2023-04-01 RX ADMIN — BUDESONIDE AND FORMOTEROL FUMARATE DIHYDRATE 2 PUFF(S): 160; 4.5 AEROSOL RESPIRATORY (INHALATION) at 05:47

## 2023-04-01 RX ADMIN — POLYETHYLENE GLYCOL 3350 17 GRAM(S): 17 POWDER, FOR SOLUTION ORAL at 12:54

## 2023-04-01 RX ADMIN — ATORVASTATIN CALCIUM 80 MILLIGRAM(S): 80 TABLET, FILM COATED ORAL at 22:15

## 2023-04-01 RX ADMIN — Medication 3 MILLILITER(S): at 17:21

## 2023-04-01 RX ADMIN — Medication 3 MILLILITER(S): at 23:50

## 2023-04-01 RX ADMIN — CEFEPIME 100 MILLIGRAM(S): 1 INJECTION, POWDER, FOR SOLUTION INTRAMUSCULAR; INTRAVENOUS at 05:47

## 2023-04-01 RX ADMIN — APIXABAN 2.5 MILLIGRAM(S): 2.5 TABLET, FILM COATED ORAL at 17:40

## 2023-04-01 RX ADMIN — APIXABAN 2.5 MILLIGRAM(S): 2.5 TABLET, FILM COATED ORAL at 05:45

## 2023-04-01 RX ADMIN — Medication 3 MILLILITER(S): at 12:50

## 2023-04-01 RX ADMIN — CEFEPIME 100 MILLIGRAM(S): 1 INJECTION, POWDER, FOR SOLUTION INTRAMUSCULAR; INTRAVENOUS at 22:15

## 2023-04-01 NOTE — PROCEDURE NOTE - NSPROCDETAILS_GEN_ALL_CORE
US guided, confirmed on short axis/blood seen on insertion/dressing applied/flushes easily/secured in place/sterile technique, catheter placed

## 2023-04-01 NOTE — PROGRESS NOTE ADULT - SUBJECTIVE AND OBJECTIVE BOX
Pt continues on IV antibx  no c/o abd pain      MEDICATIONS  (STANDING):  albuterol    0.083%. 2.5 milliGRAM(s) Nebulizer once  albuterol/ipratropium for Nebulization 3 milliLiter(s) Nebulizer every 6 hours  apixaban 2.5 milliGRAM(s) Oral two times a day  atorvastatin 80 milliGRAM(s) Oral at bedtime  budesonide 160 MICROgram(s)/formoterol 4.5 MICROgram(s) Inhaler 2 Puff(s) Inhalation two times a day  cefepime   IVPB 1000 milliGRAM(s) IV Intermittent every 8 hours  cefepime   IVPB      clopidogrel Tablet 75 milliGRAM(s) Oral daily  cyanocobalamin 1000 MICROGram(s) Oral daily  dextrose 5%. 1000 milliLiter(s) (50 mL/Hr) IV Continuous <Continuous>  dextrose 5%. 1000 milliLiter(s) (100 mL/Hr) IV Continuous <Continuous>  dextrose 50% Injectable 25 Gram(s) IV Push once  dextrose 50% Injectable 12.5 Gram(s) IV Push once  dextrose 50% Injectable 25 Gram(s) IV Push once  folic acid 1 milliGRAM(s) Oral daily  furosemide    Tablet 40 milliGRAM(s) Oral daily  glucagon  Injectable 1 milliGRAM(s) IntraMuscular once  insulin lispro (ADMELOG) corrective regimen sliding scale   SubCutaneous three times a day before meals  insulin lispro (ADMELOG) corrective regimen sliding scale   SubCutaneous at bedtime  metoprolol succinate ER 25 milliGRAM(s) Oral daily  midodrine. 10 milliGRAM(s) Oral three times a day  mirtazapine 30 milliGRAM(s) Oral at bedtime  polyethylene glycol 3350 17 Gram(s) Oral daily  spironolactone 25 milliGRAM(s) Oral daily  tamsulosin 0.4 milliGRAM(s) Oral at bedtime  tiotropium 2.5 MICROgram(s) Inhaler 2 Puff(s) Inhalation daily    MEDICATIONS  (PRN):  acetaminophen     Tablet .. 650 milliGRAM(s) Oral every 6 hours PRN Temp greater or equal to 38C (100.4F), Mild Pain (1 - 3)  aluminum hydroxide/magnesium hydroxide/simethicone Suspension 30 milliLiter(s) Oral every 4 hours PRN Dyspepsia  dextrose Oral Gel 15 Gram(s) Oral once PRN Blood Glucose LESS THAN 70 milliGRAM(s)/deciliter  melatonin 3 milliGRAM(s) Oral at bedtime PRN Insomnia  ondansetron Injectable 4 milliGRAM(s) IV Push every 8 hours PRN Nausea and/or Vomiting      Allergies    Hytrin (Unknown)  peanuts (Unknown)  rabeprazole (Unknown)  yellow dye (Hives)    Intolerances        Vital Signs Last 24 Hrs  T(C): 36.4 (01 Apr 2023 22:03), Max: 37 (31 Mar 2023 23:33)  T(F): 97.6 (01 Apr 2023 22:03), Max: 98.6 (31 Mar 2023 23:33)  HR: 76 (01 Apr 2023 22:03) (76 - 85)  BP: 88/55 (01 Apr 2023 22:03) (88/55 - 113/67)  BP(mean): 66 (01 Apr 2023 22:03) (66 - 69)  RR: 16 (01 Apr 2023 22:03) (16 - 19)  SpO2: 100% (01 Apr 2023 22:03) (94% - 100%)    Parameters below as of 01 Apr 2023 22:03  Patient On (Oxygen Delivery Method): nasal cannula  O2 Flow (L/min): 2      PHYSICAL EXAM:  General: NAD.  CVS: S1, S2  Chest: air entry bilaterally present  Abd: BS present, soft, non-tender, old peg site healing      LABS:                        7.5    5.26  )-----------( 183      ( 01 Apr 2023 05:48 )             24.5     04-01    138  |  107  |  17  ----------------------------<  76  3.3<L>   |  25  |  1.16    Ca    8.3<L>      01 Apr 2023 05:48  Mg     2.4     03-31        follow CBC  continue antibx

## 2023-04-01 NOTE — PROGRESS NOTE ADULT - ASSESSMENT
83 y/o male w/ PMHx of CHF w/ ReF, COPD, Prostate Ca, DM type 2, CAD s/p MI Atrial Flutter on Eliquis, [facility], s/p RT for his Prostate as well, discharged on Life Vest, sent in from  rehab for SOB. Pt admitted for acute on chronic systolic CHF      # Fever with hypotension   - 102+ rectal  - suspect 2/2 PEG site infection, s/p PEG removal by surgery   - ID called, c/w Vanc and Cefepime  - f/u [id number]  - judicious fluids   - increase  midodrine , BP stable, afebrile       # HF w/ ReF  # Acute on chronic systolic CHF  - start Lasix 40mg IV BID  - ECHO with EF 25-30%, cards following, brianne recs  - Strict I/Os, daily wts  - Fluid restriction  - toprol 25 mg po daily spironolactone 25 mg po daily (on hold due to hypotesnion)   -Farxiga on hold (will reconsider until discharge)     # CAD s/p stents  - c/w plavix, statin    # Atrial Flutter  - c/w BB  - c/w Eliquis      # Anemia  - Pt w/ hx of Anemia, BL H/H unknown  - no reported BRBPR or melana  - c/w Folic acid, B12    # DM type 2  - Pt not on any meds [facility]  - FS qAC and HS w/ SSI      # COPD  - c/w Inhalers      # Prostate Ca  - Pt reports he is s/p 28 day RT  - On Orgovux; nonformulary; pls have family bring in        # DVT ppx - c/w Eliquis    Pt is full code    Dispo: family wants to pt to be transferred to [facility] Dr. Rain ([phone]), however transfer on hold due to recurrent fever and hypotension, infectious workup underway. Call Dr. Rain to resume transfer when pt more stable.

## 2023-04-01 NOTE — PROGRESS NOTE ADULT - SUBJECTIVE AND OBJECTIVE BOX
Cardiology Progress Note    Interval Events:  No significant events      MEDICATIONS:  apixaban 2.5 milliGRAM(s) Oral two times a day  clopidogrel Tablet 75 milliGRAM(s) Oral daily  furosemide    Tablet 40 milliGRAM(s) Oral daily  metoprolol succinate ER 25 milliGRAM(s) Oral daily  midodrine. 10 milliGRAM(s) Oral three times a day  spironolactone 25 milliGRAM(s) Oral daily  cefepime   IVPB      cefepime   IVPB 1000 milliGRAM(s) IV Intermittent every 8 hours  vancomycin  IVPB      vancomycin  IVPB 1000 milliGRAM(s) IV Intermittent every 24 hours  albuterol    0.083%. 2.5 milliGRAM(s) Nebulizer once  albuterol/ipratropium for Nebulization 3 milliLiter(s) Nebulizer every 6 hours  budesonide 160 MICROgram(s)/formoterol 4.5 MICROgram(s) Inhaler 2 Puff(s) Inhalation two times a day  tiotropium 2.5 MICROgram(s) Inhaler 2 Puff(s) Inhalation daily    acetaminophen     Tablet .. 650 milliGRAM(s) Oral every 6 hours PRN  melatonin 3 milliGRAM(s) Oral at bedtime PRN  mirtazapine 30 milliGRAM(s) Oral at bedtime  ondansetron Injectable 4 milliGRAM(s) IV Push every 8 hours PRN    aluminum hydroxide/magnesium hydroxide/simethicone Suspension 30 milliLiter(s) Oral every 4 hours PRN  polyethylene glycol 3350 17 Gram(s) Oral daily    atorvastatin 80 milliGRAM(s) Oral at bedtime  dapagliflozin 10 milliGRAM(s) Oral every 24 hours  dextrose 50% Injectable 25 Gram(s) IV Push once  dextrose 50% Injectable 12.5 Gram(s) IV Push once  dextrose 50% Injectable 25 Gram(s) IV Push once  dextrose Oral Gel 15 Gram(s) Oral once PRN  glucagon  Injectable 1 milliGRAM(s) IntraMuscular once  insulin lispro (ADMELOG) corrective regimen sliding scale   SubCutaneous three times a day before meals  insulin lispro (ADMELOG) corrective regimen sliding scale   SubCutaneous at bedtime    cyanocobalamin 1000 MICROGram(s) Oral daily  dextrose 5%. 1000 milliLiter(s) IV Continuous <Continuous>  dextrose 5%. 1000 milliLiter(s) IV Continuous <Continuous>  folic acid 1 milliGRAM(s) Oral daily  tamsulosin 0.4 milliGRAM(s) Oral at bedtime      PHYSICAL EXAM:  T(C): 36.8 (04-01-23 @ 10:53), Max: 37 (03-31-23 @ 23:33)  HR: 82 (04-01-23 @ 10:53) (76 - 90)  BP: 103/67 (04-01-23 @ 10:53) (85/55 - 113/67)  RR: 19 (04-01-23 @ 10:53) (16 - 19)  SpO2: 96% (04-01-23 @ 10:53) (93% - 97%)  Wt(kg): --  I&O's Summary    31 Mar 2023 07:01  -  01 Apr 2023 07:00  --------------------------------------------------------  IN: 840 mL / OUT: 800 mL / NET: 40 mL    Appearance: No acute distress  HEENT:   Normal oral mucosa, PERRL  Cardiovascular: Normal S1 S2,   Respiratory: Lungs clear to auscultation, good air movement  Psychiatry: mood & affect appropriate  Gastrointestinal:  soft nt	  Skin: no cyanosis	  Neurologic: grossly non-focal    LABS:	 	  CBC Full  -  ( 01 Apr 2023 05:48 )  WBC Count : 5.26 K/uL  Hemoglobin : 7.5 g/dL  Hematocrit : 24.5 %  Platelet Count - Automated : 183 K/uL    04-01    138  |  107  |  17  ----------------------------<  76  3.3<L>   |  25  |  1.16    Ca    8.3<L>      01 Apr 2023 05:48  Mg     2.4     03-31      TELEMETRY: 	 SR, PVC's   ECG:  	  RADIOLOGY:  OTHER: 	    PREVIOUS DIAGNOSTIC TESTING:    [ ] Echocardiogram:   [ ] Catheterization:  [ ] Stress Test:

## 2023-04-01 NOTE — PROGRESS NOTE ADULT - SUBJECTIVE AND OBJECTIVE BOX
Patient is a 82y old  Male who presents with a chief complaint of Acute on Chronic CHF (01 Apr 2023 12:42)      INTERVAL HPI/OVERNIGHT EVENTS: pt feeling well, denies any sob, chest pain or any other symptoms.     MEDICATIONS  (STANDING):  albuterol    0.083%. 2.5 milliGRAM(s) Nebulizer once  albuterol/ipratropium for Nebulization 3 milliLiter(s) Nebulizer every 6 hours  apixaban 2.5 milliGRAM(s) Oral two times a day  atorvastatin 80 milliGRAM(s) Oral at bedtime  budesonide 160 MICROgram(s)/formoterol 4.5 MICROgram(s) Inhaler 2 Puff(s) Inhalation two times a day  cefepime   IVPB      cefepime   IVPB 1000 milliGRAM(s) IV Intermittent every 8 hours  clopidogrel Tablet 75 milliGRAM(s) Oral daily  cyanocobalamin 1000 MICROGram(s) Oral daily  dextrose 5%. 1000 milliLiter(s) (50 mL/Hr) IV Continuous <Continuous>  dextrose 5%. 1000 milliLiter(s) (100 mL/Hr) IV Continuous <Continuous>  dextrose 50% Injectable 25 Gram(s) IV Push once  dextrose 50% Injectable 12.5 Gram(s) IV Push once  dextrose 50% Injectable 25 Gram(s) IV Push once  folic acid 1 milliGRAM(s) Oral daily  furosemide    Tablet 40 milliGRAM(s) Oral daily  glucagon  Injectable 1 milliGRAM(s) IntraMuscular once  insulin lispro (ADMELOG) corrective regimen sliding scale   SubCutaneous three times a day before meals  insulin lispro (ADMELOG) corrective regimen sliding scale   SubCutaneous at bedtime  metoprolol succinate ER 25 milliGRAM(s) Oral daily  midodrine. 10 milliGRAM(s) Oral three times a day  mirtazapine 30 milliGRAM(s) Oral at bedtime  polyethylene glycol 3350 17 Gram(s) Oral daily  spironolactone 25 milliGRAM(s) Oral daily  tamsulosin 0.4 milliGRAM(s) Oral at bedtime  tiotropium 2.5 MICROgram(s) Inhaler 2 Puff(s) Inhalation daily  vancomycin  IVPB      vancomycin  IVPB 1000 milliGRAM(s) IV Intermittent every 24 hours    MEDICATIONS  (PRN):  acetaminophen     Tablet .. 650 milliGRAM(s) Oral every 6 hours PRN Temp greater or equal to 38C (100.4F), Mild Pain (1 - 3)  aluminum hydroxide/magnesium hydroxide/simethicone Suspension 30 milliLiter(s) Oral every 4 hours PRN Dyspepsia  dextrose Oral Gel 15 Gram(s) Oral once PRN Blood Glucose LESS THAN 70 milliGRAM(s)/deciliter  melatonin 3 milliGRAM(s) Oral at bedtime PRN Insomnia  ondansetron Injectable 4 milliGRAM(s) IV Push every 8 hours PRN Nausea and/or Vomiting      Allergies    Hytrin (Unknown)  peanuts (Unknown)  rabeprazole (Unknown)  yellow dye (Hives)    Intolerances        REVIEW OF SYSTEMS:  CONSTITUTIONAL: No fever, weight loss, or fatigue  EYES: No eye pain, visual disturbances, or discharge  ENMT:  No difficulty hearing, tinnitus, vertigo; No sinus or throat pain  NECK: No pain or stiffness  BREASTS: No pain, masses, or nipple discharge  RESPIRATORY: No cough, wheezing, chills or hemoptysis; No shortness of breath  CARDIOVASCULAR: No chest pain, palpitations, dizziness, or leg swelling  GASTROINTESTINAL: No abdominal or epigastric pain. No nausea, vomiting, or hematemesis; No diarrhea or constipation. No melena or hematochezia.  GENITOURINARY: No dysuria, frequency, hematuria, or incontinence  NEUROLOGICAL: No headaches, memory loss, loss of strength, numbness, or tremors  SKIN: No itching, burning, rashes, or lesions   LYMPH NODES: No enlarged glands  ENDOCRINE: No heat or cold intolerance; No hair loss  MUSCULOSKELETAL: No joint pain or swelling; No muscle, back, or extremity pain  PSYCHIATRIC: No depression, anxiety, mood swings, or difficulty sleeping  HEME/LYMPH: No easy bruising, or bleeding gums  ALLERGY AND IMMUNOLOGIC: No hives or eczema    Vital Signs Last 24 Hrs  T(C): 36.8 (01 Apr 2023 10:53), Max: 37 (31 Mar 2023 23:33)  T(F): 98.3 (01 Apr 2023 10:53), Max: 98.6 (31 Mar 2023 23:33)  HR: 78 (01 Apr 2023 16:13) (76 - 89)  BP: 103/67 (01 Apr 2023 10:53) (91/58 - 113/67)  BP(mean): 69 (01 Apr 2023 00:49) (68 - 69)  RR: 19 (01 Apr 2023 10:53) (16 - 19)  SpO2: 97% (01 Apr 2023 16:13) (93% - 97%)    Parameters below as of 01 Apr 2023 10:53  Patient On (Oxygen Delivery Method): nasal cannula        PHYSICAL EXAM:  GENERAL: NAD, well-groomed, well-developed  HEAD:  Atraumatic, Normocephalic  EYES: EOMI, PERRLA, conjunctiva and sclera clear  ENMT: No tonsillar erythema, exudates, or enlargement; Moist mucous membranes, Good dentition, No lesions  NECK: Supple, No JVD, Normal thyroid  NERVOUS SYSTEM:  Alert & Oriented X3, Good concentration; Motor Strength 5/5 B/L upper and lower extremities; DTRs 2+ intact and symmetric  CHEST/LUNG: Clear to percussion bilaterally; No rales, rhonchi, wheezing, or rubs  HEART: Regular rate and rhythm; No murmurs, rubs, or gallops  ABDOMEN: Soft, Nontender, Nondistended; Bowel sounds present  EXTREMITIES:  2+ Peripheral Pulses, No clubbing, cyanosis, or edema  LYMPH: No lymphadenopathy noted  SKIN: No rashes or lesions    LABS:                        7.5    5.26  )-----------( 183      ( 01 Apr 2023 05:48 )             24.5     04-01    138  |  107  |  17  ----------------------------<  76  3.3<L>   |  25  |  1.16    Ca    8.3<L>      01 Apr 2023 05:48  Mg     2.4     03-31          CAPILLARY BLOOD GLUCOSE      POCT Blood Glucose.: 138 mg/dL (01 Apr 2023 17:22)  POCT Blood Glucose.: 95 mg/dL (01 Apr 2023 12:14)  POCT Blood Glucose.: 80 mg/dL (01 Apr 2023 08:27)  POCT Blood Glucose.: 101 mg/dL (31 Mar 2023 21:35)      RADIOLOGY & ADDITIONAL TESTS:    Imaging Personally Reviewed:  [ ] YES  [ ] NO    Consultant(s) Notes Reviewed:  [ ] YES  [ ] NO    Care Discussed with Consultants/Other Providers [ ] YES  [ ] NO

## 2023-04-01 NOTE — PHARMACOTHERAPY INTERVENTION NOTE - COMMENTS
Recommended to discontinue dapagliflozin as patient does not meet criteria for initiation inpatient.

## 2023-04-01 NOTE — PROGRESS NOTE ADULT - ASSESSMENT
83 yo male with likely history of ischemic cardiomyopathy. Also chr AF, CAD s/p PCI 3 years ago, DM2, COPD.  [facility], [facility].  Here for acute dyspnea x one day and productive cough, likely sec to exacerbation of CHF.  No evidence of acute ischemia, but cath [facility] pending at [facility].  Is on life vest; not wearing at present, patient needs education regarding its use prior to discharge, Rebecca Vicente was informed and will contact the Rep prior to discharge  repeat TTE with EF 25-30%    Pt with c/o diffused abd discomfort with Temp of 102.6 R on 3/29 , PEG site found to be inflamed, indurated, Tube removed, started on ABx     Plan:  Telemetry monitoring  Continue to IV Lasix daily   monitor renal function, electrolytes and daily weights.  Optimize GDMT, currently low BP precludes adding Entresto, bbl /aldactone doses held 2/2 low BP, currently on midodrine for BP support   Symptoms likely worsened by significant anemia, etiology? On Plavix as well as DOAC, this may be chronic anemia w/u as per primary team   sepsis w/u, PEG tube removed, ABx as per ID  [facility] [place], transfer process has been started by medicine team but due to new fever, Dr Rain from [facility] recommended workup and ensuring clinical stability prior to transfer.   If no transfer occurs and pt is getting discharged from here, pt and family should be educated on use of Life Vest prior to discharge, Rebecca Hernandez can be reached at  for retraining.

## 2023-04-02 LAB
ALBUMIN SERPL ELPH-MCNC: 2 G/DL — LOW (ref 3.3–5)
ALP SERPL-CCNC: 100 U/L — SIGNIFICANT CHANGE UP (ref 40–120)
ALT FLD-CCNC: 15 U/L — SIGNIFICANT CHANGE UP (ref 12–78)
ANION GAP SERPL CALC-SCNC: 3 MMOL/L — LOW (ref 5–17)
AST SERPL-CCNC: 17 U/L — SIGNIFICANT CHANGE UP (ref 15–37)
BILIRUB SERPL-MCNC: 0.5 MG/DL — SIGNIFICANT CHANGE UP (ref 0.2–1.2)
BUN SERPL-MCNC: 14 MG/DL — SIGNIFICANT CHANGE UP (ref 7–23)
CALCIUM SERPL-MCNC: 9.1 MG/DL — SIGNIFICANT CHANGE UP (ref 8.5–10.1)
CHLORIDE SERPL-SCNC: 114 MMOL/L — HIGH (ref 96–108)
CO2 SERPL-SCNC: 24 MMOL/L — SIGNIFICANT CHANGE UP (ref 22–31)
CREAT SERPL-MCNC: 1.16 MG/DL — SIGNIFICANT CHANGE UP (ref 0.5–1.3)
EGFR: 64 ML/MIN/1.73M2 — SIGNIFICANT CHANGE UP
GLUCOSE BLDC GLUCOMTR-MCNC: 102 MG/DL — HIGH (ref 70–99)
GLUCOSE BLDC GLUCOMTR-MCNC: 105 MG/DL — HIGH (ref 70–99)
GLUCOSE BLDC GLUCOMTR-MCNC: 107 MG/DL — HIGH (ref 70–99)
GLUCOSE BLDC GLUCOMTR-MCNC: 109 MG/DL — HIGH (ref 70–99)
GLUCOSE SERPL-MCNC: 107 MG/DL — HIGH (ref 70–99)
HCT VFR BLD CALC: 25.7 % — LOW (ref 39–50)
HGB BLD-MCNC: 8 G/DL — LOW (ref 13–17)
MAGNESIUM SERPL-MCNC: 2.2 MG/DL — SIGNIFICANT CHANGE UP (ref 1.6–2.6)
MCHC RBC-ENTMCNC: 31.1 G/DL — LOW (ref 32–36)
MCHC RBC-ENTMCNC: 33.2 PG — SIGNIFICANT CHANGE UP (ref 27–34)
MCV RBC AUTO: 106.6 FL — HIGH (ref 80–100)
NRBC # BLD: 0 /100 WBCS — SIGNIFICANT CHANGE UP (ref 0–0)
PLATELET # BLD AUTO: 203 K/UL — SIGNIFICANT CHANGE UP (ref 150–400)
POTASSIUM SERPL-MCNC: 3.8 MMOL/L — SIGNIFICANT CHANGE UP (ref 3.5–5.3)
POTASSIUM SERPL-SCNC: 3.8 MMOL/L — SIGNIFICANT CHANGE UP (ref 3.5–5.3)
PROT SERPL-MCNC: 6 GM/DL — SIGNIFICANT CHANGE UP (ref 6–8.3)
RBC # BLD: 2.41 M/UL — LOW (ref 4.2–5.8)
RBC # FLD: 16.5 % — HIGH (ref 10.3–14.5)
SODIUM SERPL-SCNC: 141 MMOL/L — SIGNIFICANT CHANGE UP (ref 135–145)
WBC # BLD: 5.29 K/UL — SIGNIFICANT CHANGE UP (ref 3.8–10.5)
WBC # FLD AUTO: 5.29 K/UL — SIGNIFICANT CHANGE UP (ref 3.8–10.5)

## 2023-04-02 PROCEDURE — 99232 SBSQ HOSP IP/OBS MODERATE 35: CPT

## 2023-04-02 RX ADMIN — MIDODRINE HYDROCHLORIDE 10 MILLIGRAM(S): 2.5 TABLET ORAL at 05:18

## 2023-04-02 RX ADMIN — BUDESONIDE AND FORMOTEROL FUMARATE DIHYDRATE 2 PUFF(S): 160; 4.5 AEROSOL RESPIRATORY (INHALATION) at 05:23

## 2023-04-02 RX ADMIN — Medication 3 MILLILITER(S): at 23:29

## 2023-04-02 RX ADMIN — PREGABALIN 1000 MICROGRAM(S): 225 CAPSULE ORAL at 11:54

## 2023-04-02 RX ADMIN — APIXABAN 2.5 MILLIGRAM(S): 2.5 TABLET, FILM COATED ORAL at 05:17

## 2023-04-02 RX ADMIN — Medication 3 MILLILITER(S): at 12:11

## 2023-04-02 RX ADMIN — MIDODRINE HYDROCHLORIDE 10 MILLIGRAM(S): 2.5 TABLET ORAL at 17:36

## 2023-04-02 RX ADMIN — TIOTROPIUM BROMIDE 2 PUFF(S): 18 CAPSULE ORAL; RESPIRATORY (INHALATION) at 11:56

## 2023-04-02 RX ADMIN — CEFEPIME 100 MILLIGRAM(S): 1 INJECTION, POWDER, FOR SOLUTION INTRAMUSCULAR; INTRAVENOUS at 14:14

## 2023-04-02 RX ADMIN — TAMSULOSIN HYDROCHLORIDE 0.4 MILLIGRAM(S): 0.4 CAPSULE ORAL at 22:32

## 2023-04-02 RX ADMIN — MIRTAZAPINE 30 MILLIGRAM(S): 45 TABLET, ORALLY DISINTEGRATING ORAL at 22:33

## 2023-04-02 RX ADMIN — CEFEPIME 100 MILLIGRAM(S): 1 INJECTION, POWDER, FOR SOLUTION INTRAMUSCULAR; INTRAVENOUS at 22:44

## 2023-04-02 RX ADMIN — APIXABAN 2.5 MILLIGRAM(S): 2.5 TABLET, FILM COATED ORAL at 18:37

## 2023-04-02 RX ADMIN — Medication 1 MILLIGRAM(S): at 11:56

## 2023-04-02 RX ADMIN — BUDESONIDE AND FORMOTEROL FUMARATE DIHYDRATE 2 PUFF(S): 160; 4.5 AEROSOL RESPIRATORY (INHALATION) at 18:37

## 2023-04-02 RX ADMIN — ATORVASTATIN CALCIUM 80 MILLIGRAM(S): 80 TABLET, FILM COATED ORAL at 22:31

## 2023-04-02 RX ADMIN — CEFEPIME 100 MILLIGRAM(S): 1 INJECTION, POWDER, FOR SOLUTION INTRAMUSCULAR; INTRAVENOUS at 05:19

## 2023-04-02 RX ADMIN — POLYETHYLENE GLYCOL 3350 17 GRAM(S): 17 POWDER, FOR SOLUTION ORAL at 11:54

## 2023-04-02 RX ADMIN — Medication 3 MILLILITER(S): at 17:03

## 2023-04-02 RX ADMIN — MIDODRINE HYDROCHLORIDE 10 MILLIGRAM(S): 2.5 TABLET ORAL at 11:53

## 2023-04-02 RX ADMIN — CLOPIDOGREL BISULFATE 75 MILLIGRAM(S): 75 TABLET, FILM COATED ORAL at 11:55

## 2023-04-02 RX ADMIN — Medication 3 MILLILITER(S): at 05:19

## 2023-04-02 NOTE — PROGRESS NOTE ADULT - ASSESSMENT
81 y/o male w/ PMHx of CHF w/ ReF, COPD, Prostate Ca, DM type 2, CAD s/p MI Atrial Flutter on Eliquis, [facility] for [facility], CHF, s/p RT for his Prostate as well, discharged on Life Vest, sent in from  rehab for SOB. Pt admitted for acute on chronic systolic CHF      # Fever with hypotension   - 102+ rectal---now afebrile but still hypotensive   - suspect 2/2 PEG site infection, s/p PEG removal by surgery   - ID called, c/w Vanc and Cefepime--off Vancomycin since MRSA negative, continue only Cefepime   - f/u [id number]  - judicious fluids   - continue midodrine 10mg, BP on lower , pt asymptomatic----will continue to monitor for now, bp might still be low from infection? pt might need bit of fluid (since more on a dry side)       # HF w/ ReF  # Acute on chronic systolic CHF  - start Lasix 40mg IV BID (on hold due to bp)  - toprol 25 mg po daily spironolactone 25 mg po daily (on hold due to hypotesnion)   -Farxiga on hold (will reconsider until discharge)   - ECHO with EF 25-30%, cards following, brianne recs  - Strict I/Os, daily wts    # CAD s/p stents  - c/w plavix, statin    # Atrial Flutter  - c/w BB  - c/w Eliquis      # Anemia  - Pt w/ hx of Anemia, BL H/H unknown  - no reported BRBPR or melana  - c/w Folic acid, B12    # DM type 2  - Pt not on any meds per [facility]  - FS qAC and HS w/ SSI      # COPD  - c/w Inhalers      # Prostate Ca  - Pt reports he is s/p 28 day RT  - On Orgovux; nonformulary; pls have family bring in        # DVT ppx - c/w Eliquis    Pt is full code    Dispo: family wants to pt to be transferred to [facility] Dr. Rain ([phone]), however transfer on hold due to recurrent fever and hypotension, infectious workup underway. Call Dr. Rain to resume transfer when pt more stable.

## 2023-04-02 NOTE — PHARMACOTHERAPY INTERVENTION NOTE - COMMENTS
Recommended to increase apixaban dose from 2.5 mG by mouth twice a day for a-fib to 5 mG as patient does not meet criteria for reduced dose. Provider decided to continue therapy as prescribed and will monitor and reassess.

## 2023-04-02 NOTE — PROGRESS NOTE ADULT - SUBJECTIVE AND OBJECTIVE BOX
Patient is a 82y old  Male who presents with a chief complaint of Acute on Chronic CHF (01 Apr 2023 12:42)      INTERVAL HPI/OVERNIGHT EVENTS: pt's BP is on low side all day---but patient feels perfectly well- denies any dizziness, lightheadedness, having chest pain or any other associated symptoms.     MEDICATIONS  (STANDING):  albuterol    0.083%. 2.5 milliGRAM(s) Nebulizer once  albuterol/ipratropium for Nebulization 3 milliLiter(s) Nebulizer every 6 hours  apixaban 2.5 milliGRAM(s) Oral two times a day  atorvastatin 80 milliGRAM(s) Oral at bedtime  budesonide 160 MICROgram(s)/formoterol 4.5 MICROgram(s) Inhaler 2 Puff(s) Inhalation two times a day  cefepime   IVPB      cefepime   IVPB 1000 milliGRAM(s) IV Intermittent every 8 hours  clopidogrel Tablet 75 milliGRAM(s) Oral daily  cyanocobalamin 1000 MICROGram(s) Oral daily  dextrose 5%. 1000 milliLiter(s) (50 mL/Hr) IV Continuous <Continuous>  dextrose 5%. 1000 milliLiter(s) (100 mL/Hr) IV Continuous <Continuous>  dextrose 50% Injectable 25 Gram(s) IV Push once  dextrose 50% Injectable 12.5 Gram(s) IV Push once  dextrose 50% Injectable 25 Gram(s) IV Push once  folic acid 1 milliGRAM(s) Oral daily  furosemide    Tablet 40 milliGRAM(s) Oral daily  glucagon  Injectable 1 milliGRAM(s) IntraMuscular once  insulin lispro (ADMELOG) corrective regimen sliding scale   SubCutaneous three times a day before meals  insulin lispro (ADMELOG) corrective regimen sliding scale   SubCutaneous at bedtime  metoprolol succinate ER 25 milliGRAM(s) Oral daily  midodrine. 10 milliGRAM(s) Oral three times a day  mirtazapine 30 milliGRAM(s) Oral at bedtime  polyethylene glycol 3350 17 Gram(s) Oral daily  spironolactone 25 milliGRAM(s) Oral daily  tamsulosin 0.4 milliGRAM(s) Oral at bedtime  tiotropium 2.5 MICROgram(s) Inhaler 2 Puff(s) Inhalation daily  vancomycin  IVPB      vancomycin  IVPB 1000 milliGRAM(s) IV Intermittent every 24 hours    MEDICATIONS  (PRN):  acetaminophen     Tablet .. 650 milliGRAM(s) Oral every 6 hours PRN Temp greater or equal to 38C (100.4F), Mild Pain (1 - 3)  aluminum hydroxide/magnesium hydroxide/simethicone Suspension 30 milliLiter(s) Oral every 4 hours PRN Dyspepsia  dextrose Oral Gel 15 Gram(s) Oral once PRN Blood Glucose LESS THAN 70 milliGRAM(s)/deciliter  melatonin 3 milliGRAM(s) Oral at bedtime PRN Insomnia  ondansetron Injectable 4 milliGRAM(s) IV Push every 8 hours PRN Nausea and/or Vomiting      Allergies    Hytrin (Unknown)  peanuts (Unknown)  rabeprazole (Unknown)  yellow dye (Hives)    Intolerances        REVIEW OF SYSTEMS:  CONSTITUTIONAL: No fever, weight loss, or fatigue  EYES: No eye pain, visual disturbances, or discharge  ENMT:  No difficulty hearing, tinnitus, vertigo; No sinus or throat pain  NECK: No pain or stiffness  BREASTS: No pain, masses, or nipple discharge  RESPIRATORY: No cough, wheezing, chills or hemoptysis; No shortness of breath  CARDIOVASCULAR: No chest pain, palpitations, dizziness, or leg swelling  GASTROINTESTINAL: No abdominal or epigastric pain. No nausea, vomiting, or hematemesis; No diarrhea or constipation. No melena or hematochezia.  GENITOURINARY: No dysuria, frequency, hematuria, or incontinence  NEUROLOGICAL: No headaches, memory loss, loss of strength, numbness, or tremors  SKIN: No itching, burning, rashes, or lesions   LYMPH NODES: No enlarged glands  ENDOCRINE: No heat or cold intolerance; No hair loss  MUSCULOSKELETAL: No joint pain or swelling; No muscle, back, or extremity pain  PSYCHIATRIC: No depression, anxiety, mood swings, or difficulty sleeping  HEME/LYMPH: No easy bruising, or bleeding gums  ALLERGY AND IMMUNOLOGIC: No hives or eczema    Vital Signs Last 24 Hrs  T(C): 36.8 (01 Apr 2023 10:53), Max: 37 (31 Mar 2023 23:33)  T(F): 98.3 (01 Apr 2023 10:53), Max: 98.6 (31 Mar 2023 23:33)  HR: 78 (01 Apr 2023 16:13) (76 - 89)  BP: 103/67 (01 Apr 2023 10:53) (91/58 - 113/67)  BP(mean): 69 (01 Apr 2023 00:49) (68 - 69)  RR: 19 (01 Apr 2023 10:53) (16 - 19)  SpO2: 97% (01 Apr 2023 16:13) (93% - 97%)    Parameters below as of 01 Apr 2023 10:53  Patient On (Oxygen Delivery Method): nasal cannula        PHYSICAL EXAM:  GENERAL: NAD, well-groomed, well-developed  HEAD:  Atraumatic, Normocephalic  EYES: EOMI, PERRLA, conjunctiva and sclera clear  ENMT: No tonsillar erythema, exudates, or enlargement; Moist mucous membranes, Good dentition, No lesions  NECK: Supple, No JVD,   NERVOUS SYSTEM:  Alert & Oriented X3, Good concentration; Motor Strength 5/5 B/L upper and lower extremities; DTRs 2+ intact and symmetric  CHEST/LUNG: Clear to percussion bilaterally; No rales, rhonchi, wheezing, or rubs  HEART: Regular rate and rhythm; No murmurs, rubs, or gallops  ABDOMEN: Soft, Nontender, Nondistended; Bowel sounds present  EXTREMITIES:  2+ Peripheral Pulses, No clubbing, cyanosis, or no edema  SKIN: No rashes or lesions    LABS:                                     8.0    5.29  )-----------( 203      ( 02 Apr 2023 07:05 )             25.7     04-02    141  |  114<H>  |  14  ----------------------------<  107<H>  3.8   |  24  |  1.16    Ca    9.1      02 Apr 2023 07:05  Mg     2.2     04-02    TPro  6.0  /  Alb  2.0<L>  /  TBili  0.5  /  DBili  x   /  AST  17  /  ALT  15  /  AlkPhos  100  04-02

## 2023-04-02 NOTE — PROGRESS NOTE ADULT - ASSESSMENT
81 yo male with likely history of ischemic cardiomyopathy. Also chr AF, CAD s/p PCI 3 years ago, DM2, COPD.  [facility], FTT.  Here for acute dyspnea x one day and productive cough, likely sec to exacerbation of CHF.  No evidence of acute ischemia, but cath is reportedly pending at [facility].  Is on life vest; not wearing at present, patient needs education regarding its use prior to discharge, Rebecca Vicente was informed and will contact the Rep prior to discharge  repeat TTE with EF 25-30%    Pt with c/o diffused abd discomfort with Temp of 102.6 R on 3/29 , PEG site found to be inflamed, indurated, Tube removed, started on ABx     Plan:  Telemetry monitoring  Continue  Lasix daily   monitor renal function, electrolytes and daily weights.  Optimize GDMT, currently low BP precludes adding Entresto, bbl /aldactone doses held 2/2 low BP, currently on midodrine for BP support   Symptoms likely worsened by significant anemia, etiology? On Plavix as well as DOAC, this may be chronic anemia w/u as per primary team   sepsis w/u, PEG tube removed, ABx as per ID  [facility] transfer to [facility] in [place], transfer process has been started by medicine team but due to new fever, Dr Rain from [facility] recommended workup and ensuring clinical stability prior to transfer.   If no transfer occurs and pt is getting discharged from here, pt and family should be educated on use of Life Vest prior to discharge, Rebecca Hernandez can be reached at  for retraining.

## 2023-04-02 NOTE — PROGRESS NOTE ADULT - SUBJECTIVE AND OBJECTIVE BOX
Cardiology Progress Note    Interval Events:  No significant events      MEDICATIONS:  apixaban 2.5 milliGRAM(s) Oral two times a day  clopidogrel Tablet 75 milliGRAM(s) Oral daily  furosemide    Tablet 40 milliGRAM(s) Oral daily  metoprolol succinate ER 25 milliGRAM(s) Oral daily  midodrine. 10 milliGRAM(s) Oral three times a day  spironolactone 25 milliGRAM(s) Oral daily  cefepime   IVPB 1000 milliGRAM(s) IV Intermittent every 8 hours  cefepime   IVPB      albuterol    0.083%. 2.5 milliGRAM(s) Nebulizer once  albuterol/ipratropium for Nebulization 3 milliLiter(s) Nebulizer every 6 hours  budesonide 160 MICROgram(s)/formoterol 4.5 MICROgram(s) Inhaler 2 Puff(s) Inhalation two times a day  tiotropium 2.5 MICROgram(s) Inhaler 2 Puff(s) Inhalation daily  acetaminophen     Tablet .. 650 milliGRAM(s) Oral every 6 hours PRN  melatonin 3 milliGRAM(s) Oral at bedtime PRN  mirtazapine 30 milliGRAM(s) Oral at bedtime  ondansetron Injectable 4 milliGRAM(s) IV Push every 8 hours PRN  aluminum hydroxide/magnesium hydroxide/simethicone Suspension 30 milliLiter(s) Oral every 4 hours PRN  polyethylene glycol 3350 17 Gram(s) Oral daily  atorvastatin 80 milliGRAM(s) Oral at bedtime  dextrose 50% Injectable 25 Gram(s) IV Push once  dextrose 50% Injectable 12.5 Gram(s) IV Push once  dextrose 50% Injectable 25 Gram(s) IV Push once  dextrose Oral Gel 15 Gram(s) Oral once PRN  glucagon  Injectable 1 milliGRAM(s) IntraMuscular once  insulin lispro (ADMELOG) corrective regimen sliding scale   SubCutaneous three times a day before meals  insulin lispro (ADMELOG) corrective regimen sliding scale   SubCutaneous at bedtime  cyanocobalamin 1000 MICROGram(s) Oral daily  dextrose 5%. 1000 milliLiter(s) IV Continuous <Continuous>  dextrose 5%. 1000 milliLiter(s) IV Continuous <Continuous>  folic acid 1 milliGRAM(s) Oral daily  tamsulosin 0.4 milliGRAM(s) Oral at bedtime      PHYSICAL EXAM:  T(C): 36.4 (04-02-23 @ 10:14), Max: 36.9 (04-02-23 @ 00:20)  HR: 79 (04-02-23 @ 10:14) (70 - 85)  BP: 89/54 (04-02-23 @ 10:14) (85/55 - 99/63)  RR: 18 (04-02-23 @ 10:14) (16 - 18)  SpO2: 96% (04-02-23 @ 10:14) (94% - 100%)  Wt(kg): --  I&O's Summary    01 Apr 2023 07:01  -  02 Apr 2023 07:00  --------------------------------------------------------  IN: 590 mL / OUT: 1000 mL / NET: -410 mL    Appearance: No acute distress  HEENT:   Normal oral mucosa, PERRL  Cardiovascular: Normal S1 S2,   Respiratory: Lungs clear to auscultation, good air movement  Psychiatry: mood & affect appropriate  Gastrointestinal:  soft nt	  Skin: no cyanosis	  Neurologic: grossly non-focal    LABS:	 	  CBC Full  -  ( 02 Apr 2023 07:05 )  WBC Count : 5.29 K/uL  Hemoglobin : 8.0 g/dL  Hematocrit : 25.7 %  Platelet Count - Automated : 203 K/uL    04-02  141  |  114<H>  |  14  ----------------------------<  107<H>  3.8   |  24  |  1.16    04-01  138  |  107  |  17  ----------------------------<  76  3.3<L>   |  25  |  1.16    Ca    9.1      02 Apr 2023 07:05  Ca    8.3<L>      01 Apr 2023 05:48  Mg     2.2     04-02    TPro  6.0  /  Alb  2.0<L>  /  TBili  0.5  /  DBili  x   /  AST  17  /  ALT  15  /  AlkPhos  100  04-02    TELEMETRY: 	  SR, PVC's  ECG:  	  RADIOLOGY:  OTHER:

## 2023-04-02 NOTE — PROGRESS NOTE ADULT - SUBJECTIVE AND OBJECTIVE BOX
Pt awake - no c/o abd pain  Old peg site with acute skin wound but no drainage of gastric contents      MEDICATIONS  (STANDING):  albuterol    0.083%. 2.5 milliGRAM(s) Nebulizer once  albuterol/ipratropium for Nebulization 3 milliLiter(s) Nebulizer every 6 hours  apixaban 2.5 milliGRAM(s) Oral two times a day  atorvastatin 80 milliGRAM(s) Oral at bedtime  budesonide 160 MICROgram(s)/formoterol 4.5 MICROgram(s) Inhaler 2 Puff(s) Inhalation two times a day  cefepime   IVPB      cefepime   IVPB 1000 milliGRAM(s) IV Intermittent every 8 hours  clopidogrel Tablet 75 milliGRAM(s) Oral daily  cyanocobalamin 1000 MICROGram(s) Oral daily  dextrose 5%. 1000 milliLiter(s) (50 mL/Hr) IV Continuous <Continuous>  dextrose 5%. 1000 milliLiter(s) (100 mL/Hr) IV Continuous <Continuous>  dextrose 50% Injectable 25 Gram(s) IV Push once  dextrose 50% Injectable 12.5 Gram(s) IV Push once  dextrose 50% Injectable 25 Gram(s) IV Push once  folic acid 1 milliGRAM(s) Oral daily  furosemide    Tablet 40 milliGRAM(s) Oral daily  glucagon  Injectable 1 milliGRAM(s) IntraMuscular once  insulin lispro (ADMELOG) corrective regimen sliding scale   SubCutaneous three times a day before meals  insulin lispro (ADMELOG) corrective regimen sliding scale   SubCutaneous at bedtime  metoprolol succinate ER 25 milliGRAM(s) Oral daily  midodrine. 10 milliGRAM(s) Oral three times a day  mirtazapine 30 milliGRAM(s) Oral at bedtime  polyethylene glycol 3350 17 Gram(s) Oral daily  spironolactone 25 milliGRAM(s) Oral daily  tamsulosin 0.4 milliGRAM(s) Oral at bedtime  tiotropium 2.5 MICROgram(s) Inhaler 2 Puff(s) Inhalation daily    MEDICATIONS  (PRN):  acetaminophen     Tablet .. 650 milliGRAM(s) Oral every 6 hours PRN Temp greater or equal to 38C (100.4F), Mild Pain (1 - 3)  aluminum hydroxide/magnesium hydroxide/simethicone Suspension 30 milliLiter(s) Oral every 4 hours PRN Dyspepsia  dextrose Oral Gel 15 Gram(s) Oral once PRN Blood Glucose LESS THAN 70 milliGRAM(s)/deciliter  melatonin 3 milliGRAM(s) Oral at bedtime PRN Insomnia  ondansetron Injectable 4 milliGRAM(s) IV Push every 8 hours PRN Nausea and/or Vomiting      Allergies    Hytrin (Unknown)  peanuts (Unknown)  rabeprazole (Unknown)  yellow dye (Hives)    Intolerances        Vital Signs Last 24 Hrs  T(C): 36.6 (02 Apr 2023 16:51), Max: 36.9 (02 Apr 2023 00:20)  T(F): 97.8 (02 Apr 2023 16:51), Max: 98.5 (02 Apr 2023 00:20)  HR: 77 (02 Apr 2023 16:51) (70 - 82)  BP: 92/59 (02 Apr 2023 16:51) (84/54 - 99/63)  BP(mean): 75 (02 Apr 2023 06:47) (65 - 75)  RR: 18 (02 Apr 2023 16:51) (16 - 18)  SpO2: 97% (02 Apr 2023 16:51) (95% - 100%)    Parameters below as of 02 Apr 2023 16:51  Patient On (Oxygen Delivery Method): nasal cannula        PHYSICAL EXAM:  General: NAD.  CVS: S1, S2  Chest: air entry bilaterally present  Abd: BS present, soft, non-tender, +old peg site      LABS:                        8.0    5.29  )-----------( 203      ( 02 Apr 2023 07:05 )             25.7     04-02    141  |  114<H>  |  14  ----------------------------<  107<H>  3.8   |  24  |  1.16    Ca    9.1      02 Apr 2023 07:05  Mg     2.2     04-02    TPro  6.0  /  Alb  2.0<L>  /  TBili  0.5  /  DBili  x   /  AST  17  /  ALT  15  /  AlkPhos  100  04-02        continue antibx  wound care

## 2023-04-03 LAB
ANION GAP SERPL CALC-SCNC: 4 MMOL/L — LOW (ref 5–17)
BUN SERPL-MCNC: 13 MG/DL — SIGNIFICANT CHANGE UP (ref 7–23)
CALCIUM SERPL-MCNC: 8.6 MG/DL — SIGNIFICANT CHANGE UP (ref 8.5–10.1)
CHLORIDE SERPL-SCNC: 114 MMOL/L — HIGH (ref 96–108)
CO2 SERPL-SCNC: 24 MMOL/L — SIGNIFICANT CHANGE UP (ref 22–31)
CREAT SERPL-MCNC: 1.15 MG/DL — SIGNIFICANT CHANGE UP (ref 0.5–1.3)
CULTURE RESULTS: SIGNIFICANT CHANGE UP
CULTURE RESULTS: SIGNIFICANT CHANGE UP
EGFR: 64 ML/MIN/1.73M2 — SIGNIFICANT CHANGE UP
GLUCOSE BLDC GLUCOMTR-MCNC: 108 MG/DL — HIGH (ref 70–99)
GLUCOSE BLDC GLUCOMTR-MCNC: 115 MG/DL — HIGH (ref 70–99)
GLUCOSE BLDC GLUCOMTR-MCNC: 81 MG/DL — SIGNIFICANT CHANGE UP (ref 70–99)
GLUCOSE BLDC GLUCOMTR-MCNC: 83 MG/DL — SIGNIFICANT CHANGE UP (ref 70–99)
GLUCOSE SERPL-MCNC: 91 MG/DL — SIGNIFICANT CHANGE UP (ref 70–99)
HCT VFR BLD CALC: 26 % — LOW (ref 39–50)
HGB BLD-MCNC: 7.9 G/DL — LOW (ref 13–17)
MAGNESIUM SERPL-MCNC: 2.4 MG/DL — SIGNIFICANT CHANGE UP (ref 1.6–2.6)
MCHC RBC-ENTMCNC: 30.4 G/DL — LOW (ref 32–36)
MCHC RBC-ENTMCNC: 32.2 PG — SIGNIFICANT CHANGE UP (ref 27–34)
MCV RBC AUTO: 106.1 FL — HIGH (ref 80–100)
NRBC # BLD: 0 /100 WBCS — SIGNIFICANT CHANGE UP (ref 0–0)
PLATELET # BLD AUTO: 206 K/UL — SIGNIFICANT CHANGE UP (ref 150–400)
POTASSIUM SERPL-MCNC: 3.7 MMOL/L — SIGNIFICANT CHANGE UP (ref 3.5–5.3)
POTASSIUM SERPL-SCNC: 3.7 MMOL/L — SIGNIFICANT CHANGE UP (ref 3.5–5.3)
RBC # BLD: 2.45 M/UL — LOW (ref 4.2–5.8)
RBC # FLD: 16.4 % — HIGH (ref 10.3–14.5)
SODIUM SERPL-SCNC: 142 MMOL/L — SIGNIFICANT CHANGE UP (ref 135–145)
SPECIMEN SOURCE: SIGNIFICANT CHANGE UP
SPECIMEN SOURCE: SIGNIFICANT CHANGE UP
WBC # BLD: 5.3 K/UL — SIGNIFICANT CHANGE UP (ref 3.8–10.5)
WBC # FLD AUTO: 5.3 K/UL — SIGNIFICANT CHANGE UP (ref 3.8–10.5)

## 2023-04-03 PROCEDURE — 99233 SBSQ HOSP IP/OBS HIGH 50: CPT | Mod: FS

## 2023-04-03 PROCEDURE — 99233 SBSQ HOSP IP/OBS HIGH 50: CPT

## 2023-04-03 RX ADMIN — SPIRONOLACTONE 25 MILLIGRAM(S): 25 TABLET, FILM COATED ORAL at 06:14

## 2023-04-03 RX ADMIN — BUDESONIDE AND FORMOTEROL FUMARATE DIHYDRATE 2 PUFF(S): 160; 4.5 AEROSOL RESPIRATORY (INHALATION) at 17:07

## 2023-04-03 RX ADMIN — APIXABAN 2.5 MILLIGRAM(S): 2.5 TABLET, FILM COATED ORAL at 17:07

## 2023-04-03 RX ADMIN — MIDODRINE HYDROCHLORIDE 10 MILLIGRAM(S): 2.5 TABLET ORAL at 06:16

## 2023-04-03 RX ADMIN — ATORVASTATIN CALCIUM 80 MILLIGRAM(S): 80 TABLET, FILM COATED ORAL at 22:08

## 2023-04-03 RX ADMIN — TIOTROPIUM BROMIDE 2 PUFF(S): 18 CAPSULE ORAL; RESPIRATORY (INHALATION) at 11:02

## 2023-04-03 RX ADMIN — Medication 3 MILLILITER(S): at 17:35

## 2023-04-03 RX ADMIN — MIDODRINE HYDROCHLORIDE 10 MILLIGRAM(S): 2.5 TABLET ORAL at 11:01

## 2023-04-03 RX ADMIN — TAMSULOSIN HYDROCHLORIDE 0.4 MILLIGRAM(S): 0.4 CAPSULE ORAL at 22:11

## 2023-04-03 RX ADMIN — Medication 3 MILLILITER(S): at 11:37

## 2023-04-03 RX ADMIN — Medication 25 MILLIGRAM(S): at 06:13

## 2023-04-03 RX ADMIN — MIRTAZAPINE 30 MILLIGRAM(S): 45 TABLET, ORALLY DISINTEGRATING ORAL at 22:10

## 2023-04-03 RX ADMIN — Medication 40 MILLIGRAM(S): at 06:14

## 2023-04-03 RX ADMIN — Medication 3 MILLILITER(S): at 23:30

## 2023-04-03 RX ADMIN — APIXABAN 2.5 MILLIGRAM(S): 2.5 TABLET, FILM COATED ORAL at 06:12

## 2023-04-03 RX ADMIN — PREGABALIN 1000 MICROGRAM(S): 225 CAPSULE ORAL at 11:01

## 2023-04-03 RX ADMIN — Medication 1 MILLIGRAM(S): at 11:01

## 2023-04-03 RX ADMIN — CEFEPIME 100 MILLIGRAM(S): 1 INJECTION, POWDER, FOR SOLUTION INTRAMUSCULAR; INTRAVENOUS at 22:09

## 2023-04-03 RX ADMIN — Medication 3 MILLILITER(S): at 05:38

## 2023-04-03 RX ADMIN — CLOPIDOGREL BISULFATE 75 MILLIGRAM(S): 75 TABLET, FILM COATED ORAL at 11:01

## 2023-04-03 RX ADMIN — CEFEPIME 100 MILLIGRAM(S): 1 INJECTION, POWDER, FOR SOLUTION INTRAMUSCULAR; INTRAVENOUS at 06:14

## 2023-04-03 RX ADMIN — MIDODRINE HYDROCHLORIDE 10 MILLIGRAM(S): 2.5 TABLET ORAL at 17:08

## 2023-04-03 RX ADMIN — BUDESONIDE AND FORMOTEROL FUMARATE DIHYDRATE 2 PUFF(S): 160; 4.5 AEROSOL RESPIRATORY (INHALATION) at 06:16

## 2023-04-03 RX ADMIN — CEFEPIME 100 MILLIGRAM(S): 1 INJECTION, POWDER, FOR SOLUTION INTRAMUSCULAR; INTRAVENOUS at 13:16

## 2023-04-03 NOTE — PROGRESS NOTE ADULT - SUBJECTIVE AND OBJECTIVE BOX
Pt stable  feeling better  Abd wound (old peg site) appears to be closing - no drainage      MEDICATIONS  (STANDING):  albuterol    0.083%. 2.5 milliGRAM(s) Nebulizer once  albuterol/ipratropium for Nebulization 3 milliLiter(s) Nebulizer every 6 hours  apixaban 2.5 milliGRAM(s) Oral two times a day  atorvastatin 80 milliGRAM(s) Oral at bedtime  budesonide 160 MICROgram(s)/formoterol 4.5 MICROgram(s) Inhaler 2 Puff(s) Inhalation two times a day  cefepime   IVPB      cefepime   IVPB 1000 milliGRAM(s) IV Intermittent every 8 hours  clopidogrel Tablet 75 milliGRAM(s) Oral daily  cyanocobalamin 1000 MICROGram(s) Oral daily  dextrose 5%. 1000 milliLiter(s) (50 mL/Hr) IV Continuous <Continuous>  dextrose 5%. 1000 milliLiter(s) (100 mL/Hr) IV Continuous <Continuous>  dextrose 50% Injectable 25 Gram(s) IV Push once  dextrose 50% Injectable 12.5 Gram(s) IV Push once  dextrose 50% Injectable 25 Gram(s) IV Push once  folic acid 1 milliGRAM(s) Oral daily  furosemide    Tablet 40 milliGRAM(s) Oral daily  glucagon  Injectable 1 milliGRAM(s) IntraMuscular once  insulin lispro (ADMELOG) corrective regimen sliding scale   SubCutaneous three times a day before meals  insulin lispro (ADMELOG) corrective regimen sliding scale   SubCutaneous at bedtime  metoprolol succinate ER 25 milliGRAM(s) Oral daily  midodrine. 10 milliGRAM(s) Oral three times a day  mirtazapine 30 milliGRAM(s) Oral at bedtime  polyethylene glycol 3350 17 Gram(s) Oral daily  spironolactone 25 milliGRAM(s) Oral daily  tamsulosin 0.4 milliGRAM(s) Oral at bedtime  tiotropium 2.5 MICROgram(s) Inhaler 2 Puff(s) Inhalation daily    MEDICATIONS  (PRN):  acetaminophen     Tablet .. 650 milliGRAM(s) Oral every 6 hours PRN Temp greater or equal to 38C (100.4F), Mild Pain (1 - 3)  aluminum hydroxide/magnesium hydroxide/simethicone Suspension 30 milliLiter(s) Oral every 4 hours PRN Dyspepsia  dextrose Oral Gel 15 Gram(s) Oral once PRN Blood Glucose LESS THAN 70 milliGRAM(s)/deciliter  melatonin 3 milliGRAM(s) Oral at bedtime PRN Insomnia  ondansetron Injectable 4 milliGRAM(s) IV Push every 8 hours PRN Nausea and/or Vomiting      Allergies    Hytrin (Unknown)  peanuts (Unknown)  rabeprazole (Unknown)  yellow dye (Hives)    Intolerances        Vital Signs Last 24 Hrs  T(C): 37.3 (03 Apr 2023 11:05), Max: 37.3 (03 Apr 2023 11:05)  T(F): 99.1 (03 Apr 2023 11:05), Max: 99.1 (03 Apr 2023 11:05)  HR: 79 (03 Apr 2023 11:05) (75 - 91)  BP: 95/65 (03 Apr 2023 11:05) (92/59 - 101/65)  BP(mean): --  RR: 18 (03 Apr 2023 11:05) (18 - 20)  SpO2: 97% (03 Apr 2023 11:05) (96% - 100%)    Parameters below as of 03 Apr 2023 05:38  Patient On (Oxygen Delivery Method): nasal cannula        PHYSICAL EXAM:  General: NAD.  CVS: S1, S2  Chest: air entry bilaterally present  Abd: BS present, soft, non-tender, wound improving and closing      LABS:                        7.9    5.30  )-----------( 206      ( 03 Apr 2023 06:45 )             26.0     04-03    142  |  114<H>  |  13  ----------------------------<  91  3.7   |  24  |  1.15    Ca    8.6      03 Apr 2023 06:45  Mg     2.4     04-03    TPro  6.0  /  Alb  2.0<L>  /  TBili  0.5  /  DBili  x   /  AST  17  /  ALT  15  /  AlkPhos  100  04-02        continue PO diet  on antibx

## 2023-04-03 NOTE — PROGRESS NOTE ADULT - SUBJECTIVE AND OBJECTIVE BOX
KENDRICK WEBER  MRN-[id number]    Follow Up:  pna    Interval History: the pt was seen and examined earlier, no acute distress, eager to be discharged. Pt still has mild cough, no fevers, no wbc, complains of poor appetite     PAST MEDICAL & SURGICAL HISTORY:  Chronic CHF      Prostate cancer      Type 2 diabetes mellitus      Chronic kidney disease (CKD)      EMANUEL on CPAP      HLD (hyperlipidemia)      History of COPD      HTN (hypertension)      No significant past surgical history          ROS:    [ ] Unobtainable because:  [x] All other systems negative    Constitutional: no fever, no chills  Head: no trauma  Eyes: no vision changes, no eye pain  ENT:  no sore throat, no rhinorrhea  Cardiovascular:  no chest pain, no palpitation  Respiratory:  no SOB, + cough  GI:  no abd pain, no vomiting, no diarrhea, + poor appetite   urinary: no dysuria, no hematuria, no flank pain  musculoskeletal:  no joint pain, no joint swelling  skin:  no rash  neurology:  no headache, no seizure, no change in mental status  psych: no anxiety, no depression         Allergies  Hytrin (Unknown)  peanuts (Unknown)  rabeprazole (Unknown)  yellow dye (Hives)        ANTIMICROBIALS:  cefepime   IVPB    cefepime   IVPB 1000 every 8 hours      OTHER MEDS:  acetaminophen     Tablet .. 650 milliGRAM(s) Oral every 6 hours PRN  albuterol    0.083%. 2.5 milliGRAM(s) Nebulizer once  albuterol/ipratropium for Nebulization 3 milliLiter(s) Nebulizer every 6 hours  aluminum hydroxide/magnesium hydroxide/simethicone Suspension 30 milliLiter(s) Oral every 4 hours PRN  apixaban 2.5 milliGRAM(s) Oral two times a day  atorvastatin 80 milliGRAM(s) Oral at bedtime  budesonide 160 MICROgram(s)/formoterol 4.5 MICROgram(s) Inhaler 2 Puff(s) Inhalation two times a day  clopidogrel Tablet 75 milliGRAM(s) Oral daily  cyanocobalamin 1000 MICROGram(s) Oral daily  dextrose 5%. 1000 milliLiter(s) IV Continuous <Continuous>  dextrose 5%. 1000 milliLiter(s) IV Continuous <Continuous>  dextrose 50% Injectable 25 Gram(s) IV Push once  dextrose 50% Injectable 12.5 Gram(s) IV Push once  dextrose 50% Injectable 25 Gram(s) IV Push once  dextrose Oral Gel 15 Gram(s) Oral once PRN  folic acid 1 milliGRAM(s) Oral daily  furosemide    Tablet 40 milliGRAM(s) Oral daily  glucagon  Injectable 1 milliGRAM(s) IntraMuscular once  insulin lispro (ADMELOG) corrective regimen sliding scale   SubCutaneous three times a day before meals  insulin lispro (ADMELOG) corrective regimen sliding scale   SubCutaneous at bedtime  melatonin 3 milliGRAM(s) Oral at bedtime PRN  metoprolol succinate ER 25 milliGRAM(s) Oral daily  midodrine. 10 milliGRAM(s) Oral three times a day  mirtazapine 30 milliGRAM(s) Oral at bedtime  ondansetron Injectable 4 milliGRAM(s) IV Push every 8 hours PRN  polyethylene glycol 3350 17 Gram(s) Oral daily  spironolactone 25 milliGRAM(s) Oral daily  tamsulosin 0.4 milliGRAM(s) Oral at bedtime  tiotropium 2.5 MICROgram(s) Inhaler 2 Puff(s) Inhalation daily      Vital Signs Last 24 Hrs  T(C): 36.4 (03 Apr 2023 15:16), Max: 37.3 (03 Apr 2023 11:05)  T(F): 97.5 (03 Apr 2023 15:16), Max: 99.1 (03 Apr 2023 11:05)  HR: 79 (03 Apr 2023 15:16) (75 - 91)  BP: 97/64 (03 Apr 2023 15:16) (92/59 - 101/65)  BP(mean): --  RR: 18 (03 Apr 2023 15:16) (18 - 20)  SpO2: 100% (03 Apr 2023 15:16) (96% - 100%)    Parameters below as of 03 Apr 2023 15:16  Patient On (Oxygen Delivery Method): nasal cannula        Physical Exam:  Constitutional: non-toxic, no distress, on RA and comfortable   HEAD/EYES: anicteric, no conjunctival injection  ENT:  supple, no thrush  Cardiovascular:   normal S1, S2, no murmur, +edema  Respiratory: diminished breath sounds bilaterally, no wheezes   GI:  soft, normal bowel sounds, obese, PEG was removed, site is clean, no noted erythema of surrounding tissues, no significant drainage from the opening.   :  no leigh, no CVA tenderness  Musculoskeletal:  no synovitis, difficulty raising legs off the bed  Neurologic: awake and alert to person place time and situation, 3/5 strength in lower extremity bilateral , no focal findings  Skin:  no rash, no erythema, no phlebitis  Heme/Onc: no lymphadenopathy   Psychiatric:  awake, alert, appropriate mood    WBC Count: 5.30 K/uL (04-03 @ 06:45)  WBC Count: 5.29 K/uL (04-02 @ 07:05)  WBC Count: 5.26 K/uL (04-01 @ 05:48)  WBC Count: 7.05 K/uL (03-30 @ 06:52)  WBC Count: 12.66 K/uL (03-29 @ 14:35)  WBC Count: 6.91 K/uL (03-29 @ 05:32)  WBC Count: 5.94 K/uL (03-28 @ 07:00)                            7.9    5.30  )-----------( 206      ( 03 Apr 2023 06:45 )             26.0       04-03    142  |  114<H>  |  13  ----------------------------<  91  3.7   |  24  |  1.15    Ca    8.6      03 Apr 2023 06:45  Mg     2.4     04-03    TPro  6.0  /  Alb  2.0<L>  /  TBili  0.5  /  DBili  x   /  AST  17  /  ALT  15  /  AlkPhos  100  04-02          Creatinine Trend: 1.15<--, 1.16<--, 1.16<--, 1.59<--, 1.70<--, 1.45<--      MICROBIOLOGY:  v  .Blood Blood-Peripheral  03-29-23   No growth to date.  --  --      .Blood Blood-Peripheral  03-29-23   No growth to date.  --  --    Rapid RVP Result: NotDetec (03-30 @ 11:30)    Procalcitonin, Serum: 2.42 (03-30-23 @ 06:52)    Rapid RVP Result: NotDetec (03-30-23 @ 11:30)  SARS-CoV-2: NotDetec (03-30-23 @ 11:30)    SARS-CoV-2: NotDetec (30 Mar 2023 11:30)    RADIOLOGY:

## 2023-04-03 NOTE — PROGRESS NOTE ADULT - ASSESSMENT
81 y/o male w/ PMHx of CHF w/ ReF, COPD, Prostate Ca, DM type 2, CAD s/p MI Atrial Flutter on Eliquis, [facility], CHF, s/p RT for his Prostate as well, discharged on Life Vest, sent in from  rehab for SOB. Pt admitted for acute on chronic systolic CHF      # Fever with hypotension   - 102+ rectal---now afebrile but still hypotensive   - suspect 2/2 PEG site infection, s/p PEG removal by surgery   - ID called, c/w Vanc and Cefepime--off Vancomycin since MRSA negative, continue only Cefepime   - f/u [id number]  - judicious fluids   - continue midodrine 10mg, BP on lower , pt asymptomatic----will continue to monitor for now, bp might still be low from infection? pt might need bit of fluid (since more on a dry side)       # HF w/ ReF  # Acute on chronic systolic CHF  - start Lasix 40mg IV BID (on hold due to bp)  - toprol 25 mg po daily spironolactone 25 mg po daily (on hold due to hypotesnion)   -Farxiga on hold (will reconsider until discharge)   - ECHO with EF 25-30%, cards following, brianne recs  - Strict I/Os, daily wts    # CAD s/p stents  - c/w plavix, statin    # Atrial Flutter  - c/w BB  - c/w Eliquis      # Anemia  - Pt w/ hx of Anemia, BL H/H unknown  - no reported BRBPR or melana  - c/w Folic acid, B12    # DM type 2  - Pt not on any meds per [facility]  - FS qAC and HS w/ SSI      # COPD  - c/w Inhalers      # Prostate Ca  - Pt reports he is s/p 28 day RT  - On Orgovux; nonformulary; pls have family bring in        # DVT ppx - c/w Eliquis    Pt is full code    Dispo: family wants to pt to be transferred to [facility] Dr. Rain ([phone]), however transfer on hold due to recurrent fever and hypotension, infectious workup underway. Call Dr. Rain to resume transfer when pt more stable.

## 2023-04-03 NOTE — PROGRESS NOTE ADULT - SUBJECTIVE AND OBJECTIVE BOX
Patient is a 82y old  Male who presents with a chief complaint of sob    PAST MEDICAL & SURGICAL HISTORY:  Chronic CHF    Prostate cancer    Type 2 diabetes mellitus    Chronic kidney disease (CKD)    EMANUEL on CPAP    HLD (hyperlipidemia)    History of COPD    HTN (hypertension)    INTERVAL HISTORY:  	  MEDICATIONS:  MEDICATIONS  (STANDING):  albuterol    0.083%. 2.5 milliGRAM(s) Nebulizer once  albuterol/ipratropium for Nebulization 3 milliLiter(s) Nebulizer every 6 hours  apixaban 2.5 milliGRAM(s) Oral two times a day  atorvastatin 80 milliGRAM(s) Oral at bedtime  budesonide 160 MICROgram(s)/formoterol 4.5 MICROgram(s) Inhaler 2 Puff(s) Inhalation two times a day  cefepime   IVPB      cefepime   IVPB 1000 milliGRAM(s) IV Intermittent every 8 hours  clopidogrel Tablet 75 milliGRAM(s) Oral daily  cyanocobalamin 1000 MICROGram(s) Oral daily  folic acid 1 milliGRAM(s) Oral daily  furosemide    Tablet 40 milliGRAM(s) Oral daily  insulin lispro (ADMELOG) corrective regimen sliding scale   SubCutaneous three times a day before meals  insulin lispro (ADMELOG) corrective regimen sliding scale   SubCutaneous at bedtime  metoprolol succinate ER 25 milliGRAM(s) Oral daily  midodrine. 10 milliGRAM(s) Oral three times a day  mirtazapine 30 milliGRAM(s) Oral at bedtime  polyethylene glycol 3350 17 Gram(s) Oral daily  spironolactone 25 milliGRAM(s) Oral daily  tamsulosin 0.4 milliGRAM(s) Oral at bedtime  tiotropium 2.5 MICROgram(s) Inhaler 2 Puff(s) Inhalation daily    MEDICATIONS  (PRN):  acetaminophen     Tablet .. 650 milliGRAM(s) Oral every 6 hours PRN Temp greater or equal to 38C (100.4F), Mild Pain (1 - 3)  aluminum hydroxide/magnesium hydroxide/simethicone Suspension 30 milliLiter(s) Oral every 4 hours PRN Dyspepsia  dextrose Oral Gel 15 Gram(s) Oral once PRN Blood Glucose LESS THAN 70 milliGRAM(s)/deciliter  melatonin 3 milliGRAM(s) Oral at bedtime PRN Insomnia  ondansetron Injectable 4 milliGRAM(s) IV Push every 8 hours PRN Nausea and/or Vomiting    Vitals:  T(F): 99.1 (04-03-23 @ 11:05), Max: 99.1 (04-03-23 @ 11:05)  HR: 79 (04-03-23 @ 11:05) (75 - 91)  BP: 95/65 (04-03-23 @ 11:05) (92/59 - 101/65)  RR: 18 (04-03-23 @ 11:05) (18 - 20)  SpO2: 97% (04-03-23 @ 11:05) (96% - 100%)    04-02 @ 07:01  -  04-03 @ 07:00  --------------------------------------------------------  IN:    IV PiggyBack: 50 mL    Oral Fluid: 360 mL  Total IN: 410 mL    OUT:    Voided (mL): 400 mL  Total OUT: 400 mL    Total NET: 10 mL    PHYSICAL EXAM:  Neuro: Awake, responsive  CV: S1 S2 RRR  Lungs: CTABL  GI: Soft, BS +, ND, NT  Extremities: No edema    TELEMETRY:     RADIOLOGY:     DIAGNOSTIC TESTING:    [ ] Echocardiogram:  [ ] Cardiac Catheterization:  [ ] Stress Test:      LABS:	 	    03 Apr 2023 06:45    142    |  114    |  13     ----------------------------<  91     3.7     |  24     |  1.15   02 Apr 2023 07:05    141    |  114    |  14     ----------------------------<  107    3.8     |  24     |  1.16   01 Apr 2023 05:48    138    |  107    |  17     ----------------------------<  76     3.3     |  25     |  1.16     Ca    8.6        03 Apr 2023 06:45  Mg     2.4       03 Apr 2023 06:45    TPro  6.0    /  Alb  2.0    /  TBili  0.5    /  DBili  x      /  AST  17     /  ALT  15     /  AlkPhos  100    02 Apr 2023 07:05                        7.9    5.30  )-----------( 206      ( 03 Apr 2023 06:45 )             26.0 ,                       8.0    5.29  )-----------( 203      ( 02 Apr 2023 07:05 )             25.7 ,                       7.5    5.26  )-----------( 183      ( 01 Apr 2023 05:48 )             24.5                  Patient is a 82y old  Male who presents with a chief complaint of sob    PAST MEDICAL & SURGICAL HISTORY:  Chronic CHF    Prostate cancer    Type 2 diabetes mellitus    Chronic kidney disease (CKD)    EMANUEL on CPAP    HLD (hyperlipidemia)    COPD    CAD s/p PCI    HTN (hypertension)    INTERVAL HISTORY: Resting in bed in no acute distress   	  MEDICATIONS:  MEDICATIONS  (STANDING):  albuterol    0.083%. 2.5 milliGRAM(s) Nebulizer once  albuterol/ipratropium for Nebulization 3 milliLiter(s) Nebulizer every 6 hours  apixaban 2.5 milliGRAM(s) Oral two times a day  atorvastatin 80 milliGRAM(s) Oral at bedtime  budesonide 160 MICROgram(s)/formoterol 4.5 MICROgram(s) Inhaler 2 Puff(s) Inhalation two times a day  cefepime   IVPB      cefepime   IVPB 1000 milliGRAM(s) IV Intermittent every 8 hours  clopidogrel Tablet 75 milliGRAM(s) Oral daily  cyanocobalamin 1000 MICROGram(s) Oral daily  folic acid 1 milliGRAM(s) Oral daily  furosemide    Tablet 40 milliGRAM(s) Oral daily  insulin lispro (ADMELOG) corrective regimen sliding scale   SubCutaneous three times a day before meals  insulin lispro (ADMELOG) corrective regimen sliding scale   SubCutaneous at bedtime  metoprolol succinate ER 25 milliGRAM(s) Oral daily  midodrine. 10 milliGRAM(s) Oral three times a day  mirtazapine 30 milliGRAM(s) Oral at bedtime  polyethylene glycol 3350 17 Gram(s) Oral daily  spironolactone 25 milliGRAM(s) Oral daily  tamsulosin 0.4 milliGRAM(s) Oral at bedtime  tiotropium 2.5 MICROgram(s) Inhaler 2 Puff(s) Inhalation daily    MEDICATIONS  (PRN):  acetaminophen     Tablet .. 650 milliGRAM(s) Oral every 6 hours PRN Temp greater or equal to 38C (100.4F), Mild Pain (1 - 3)  aluminum hydroxide/magnesium hydroxide/simethicone Suspension 30 milliLiter(s) Oral every 4 hours PRN Dyspepsia  dextrose Oral Gel 15 Gram(s) Oral once PRN Blood Glucose LESS THAN 70 milliGRAM(s)/deciliter  melatonin 3 milliGRAM(s) Oral at bedtime PRN Insomnia  ondansetron Injectable 4 milliGRAM(s) IV Push every 8 hours PRN Nausea and/or Vomiting    Vitals:  T(F): 99.1 (04-03-23 @ 11:05), Max: 99.1 (04-03-23 @ 11:05)  HR: 79 (04-03-23 @ 11:05) (75 - 91)  BP: 95/65 (04-03-23 @ 11:05) (92/59 - 101/65)  RR: 18 (04-03-23 @ 11:05) (18 - 20)  SpO2: 97% (04-03-23 @ 11:05) (96% - 100%)    04-02 @ 07:01  -  04-03 @ 07:00  --------------------------------------------------------  IN:    IV PiggyBack: 50 mL    Oral Fluid: 360 mL  Total IN: 410 mL    OUT:    Voided (mL): 400 mL  Total OUT: 400 mL    Total NET: 10 mL    PHYSICAL EXAM:  Neuro: Awake, responsive  CV: S1 S2 RRR  Lungs: diminished to bases   GI: Soft, BS +, ND, NT  Extremities: No edema    TELEMETRY: sinus     RADIOLOGY: < from: CT Abdomen and Pelvis No Cont (03.30.23 @ 19:05) >  IMPRESSION:  No acute findings.  3.5 cm abdominal aortic aneurysm with juxta and infrarenal components.    < end of copied text >    < from: CT Chest No Cont (03.30.23 @ 19:05) >  Left lower lobe consolidation, likely pneumonia. Recommend follow-up in   6-8 weeks to assess for improvement.    Small pleural effusions.    < end of copied text >    DIAGNOSTIC TESTING:    [x ] Echocardiogram: < from: TTE Echo Complete w/o Contrast w/ Doppler (03.28.23 @ 13:46) >  PHYSICIAN INTERPRETATION:  Left Ventricle: The left ventricle was not well visualized.  Global LV systolic function was severely decreased, on limited views.   Left ventricular ejection fraction, by visual estimation, is 25 to 30%.   Suboptimal endocardial border definition noted, suggest contrast echo for   further evaluation, if clinically warranted.  Right Ventricle: Normal right ventricular size and function.  Left Atrium: The left atrium is normal in size.  Right Atrium: The right atrium is normal in size.  Pericardium: There is no evidence of pericardial effusion.  Mitral Valve: Structurally normal mitral valve, with normal leaflet   excursion. There is mild mitral annular calcification. Mild mitral valve   regurgitation is seen.  Tricuspid Valve: Structurally normal tricuspid valve, with normal leaflet   excursion. Mild tricuspid regurgitation is visualized.  Aortic Valve: Normal trileaflet aortic valve with normal opening. No   evidence of aortic valve regurgitation is seen.  Pulmonic Valve: Structurally normal pulmonic valve, with normal leaflet   excursion. No indication of pulmonicvalve regurgitation.  Aorta: The aortic root and ascending aorta are structurally normal, with   no evidence of dilitation.  Pulmonary Artery: The main pulmonary artery is normal in size.      Summary:   1. Left ventricular ejection fraction, by visual estimation, is 25 to   30%.   2. Suboptimal endocardial border definition noted, suggest contrast echo   for further evaluation, if clinically warranted.   3. Mild mitral annular calcification.   4. Mild mitral valve regurgitation.   5. Mild tricuspid regurgitation.    < end of copied text >    LABS:	 	    03 Apr 2023 06:45    142    |  114    |  13     ----------------------------<  91     3.7     |  24     |  1.15   02 Apr 2023 07:05    141    |  114    |  14     ----------------------------<  107    3.8     |  24     |  1.16   01 Apr 2023 05:48    138    |  107    |  17     ----------------------------<  76     3.3     |  25     |  1.16     Ca    8.6        03 Apr 2023 06:45  Mg     2.4       03 Apr 2023 06:45    TPro  6.0    /  Alb  2.0    /  TBili  0.5    /  DBili  x      /  AST  17     /  ALT  15     /  AlkPhos  100    02 Apr 2023 07:05                        7.9    5.30  )-----------( 206      ( 03 Apr 2023 06:45 )             26.0 ,                       8.0    5.29  )-----------( 203      ( 02 Apr 2023 07:05 )             25.7 ,                       7.5    5.26  )-----------( 183      ( 01 Apr 2023 05:48 )             24.5

## 2023-04-03 NOTE — PROGRESS NOTE ADULT - SUBJECTIVE AND OBJECTIVE BOX
[facility]  Division of Hospital Medicine  Ericka Tierney MD  Pager: [phone]      Patient is a 82y old  Male who presents with a chief complaint of Acute on Chronic CHF (02 Apr 2023 18:55)      SUBJECTIVE / OVERNIGHT EVENTS: Patient seen and examined at bedside. In brief, denies any lightheadness and dizziness. Poor appetite.   ADDITIONAL REVIEW OF SYSTEMS:    MEDICATIONS  (STANDING):  albuterol    0.083%. 2.5 milliGRAM(s) Nebulizer once  albuterol/ipratropium for Nebulization 3 milliLiter(s) Nebulizer every 6 hours  apixaban 2.5 milliGRAM(s) Oral two times a day  atorvastatin 80 milliGRAM(s) Oral at bedtime  budesonide 160 MICROgram(s)/formoterol 4.5 MICROgram(s) Inhaler 2 Puff(s) Inhalation two times a day  cefepime   IVPB      cefepime   IVPB 1000 milliGRAM(s) IV Intermittent every 8 hours  clopidogrel Tablet 75 milliGRAM(s) Oral daily  cyanocobalamin 1000 MICROGram(s) Oral daily  dextrose 5%. 1000 milliLiter(s) (50 mL/Hr) IV Continuous <Continuous>  dextrose 5%. 1000 milliLiter(s) (100 mL/Hr) IV Continuous <Continuous>  dextrose 50% Injectable 25 Gram(s) IV Push once  dextrose 50% Injectable 12.5 Gram(s) IV Push once  dextrose 50% Injectable 25 Gram(s) IV Push once  folic acid 1 milliGRAM(s) Oral daily  furosemide    Tablet 40 milliGRAM(s) Oral daily  glucagon  Injectable 1 milliGRAM(s) IntraMuscular once  insulin lispro (ADMELOG) corrective regimen sliding scale   SubCutaneous three times a day before meals  insulin lispro (ADMELOG) corrective regimen sliding scale   SubCutaneous at bedtime  metoprolol succinate ER 25 milliGRAM(s) Oral daily  midodrine. 10 milliGRAM(s) Oral three times a day  mirtazapine 30 milliGRAM(s) Oral at bedtime  polyethylene glycol 3350 17 Gram(s) Oral daily  spironolactone 25 milliGRAM(s) Oral daily  tamsulosin 0.4 milliGRAM(s) Oral at bedtime  tiotropium 2.5 MICROgram(s) Inhaler 2 Puff(s) Inhalation daily    MEDICATIONS  (PRN):  acetaminophen     Tablet .. 650 milliGRAM(s) Oral every 6 hours PRN Temp greater or equal to 38C (100.4F), Mild Pain (1 - 3)  aluminum hydroxide/magnesium hydroxide/simethicone Suspension 30 milliLiter(s) Oral every 4 hours PRN Dyspepsia  dextrose Oral Gel 15 Gram(s) Oral once PRN Blood Glucose LESS THAN 70 milliGRAM(s)/deciliter  melatonin 3 milliGRAM(s) Oral at bedtime PRN Insomnia  ondansetron Injectable 4 milliGRAM(s) IV Push every 8 hours PRN Nausea and/or Vomiting      CAPILLARY BLOOD GLUCOSE      POCT Blood Glucose.: 115 mg/dL (03 Apr 2023 11:32)  POCT Blood Glucose.: 108 mg/dL (03 Apr 2023 07:50)  POCT Blood Glucose.: 105 mg/dL (02 Apr 2023 22:39)  POCT Blood Glucose.: 102 mg/dL (02 Apr 2023 16:48)    I&O's Summary    02 Apr 2023 07:01  -  03 Apr 2023 07:00  --------------------------------------------------------  IN: 410 mL / OUT: 400 mL / NET: 10 mL        PHYSICAL EXAM:  Vital Signs Last 24 Hrs  T(C): 37.3 (03 Apr 2023 11:05), Max: 37.3 (03 Apr 2023 11:05)  T(F): 99.1 (03 Apr 2023 11:05), Max: 99.1 (03 Apr 2023 11:05)  HR: 79 (03 Apr 2023 11:05) (75 - 91)  BP: 95/65 (03 Apr 2023 11:05) (92/59 - 101/65)  BP(mean): --  RR: 18 (03 Apr 2023 11:05) (18 - 20)  SpO2: 97% (03 Apr 2023 11:05) (96% - 100%)    Parameters below as of 03 Apr 2023 05:38  Patient On (Oxygen Delivery Method): nasal cannula      CONSTITUTIONAL: Elderly man in NAD, well-developed, well-groomed  RESPIRATORY: + NC Normal respiratory effort; lungs are clear to auscultation bilaterally  CARDIOVASCULAR: Regular rate and rhythm, normal S1 and S2, no murmur/rub/gallop; No lower extremity edema; Peripheral pulses are 2+ bilaterally  ABDOMEN: Nontender to palpation, normoactive bowel sounds, no rebound/guarding; No hepatosplenomegaly  PSYCH: ; affect appropriate  NEUROLOGY: no gross sensory deficits   SKIN: No rashes; no palpable lesions    LABS:                        7.9    5.30  )-----------( 206      ( 03 Apr 2023 06:45 )             26.0     04-03    142  |  114<H>  |  13  ----------------------------<  91  3.7   |  24  |  1.15    Ca    8.6      03 Apr 2023 06:45  Mg     2.4     04-03    TPro  6.0  /  Alb  2.0<L>  /  TBili  0.5  /  DBili  x   /  AST  17  /  ALT  15  /  AlkPhos  100  04-02                RADIOLOGY & ADDITIONAL TESTS:  Results Reviewed:   Imaging Personally Reviewed:  Electrocardiogram Personally Reviewed:    COORDINATION OF CARE:  Care Discussed with Consultants/Other Providers [Y/N]:  Prior or Outpatient Records Reviewed [Y/N]:

## 2023-04-03 NOTE — PROGRESS NOTE ADULT - ASSESSMENT
81 y/o M w/ PMHx of CHF, COPD, Prostate Ca, DM type 2, Atrial Flutter on Eliquis, EMANUEL, recently admitted at the [facility] for 4 weeks for PNA, CHF, s/p RT for his Prostate as well, discharged on Life Vest, sent in from  rehab for SOB.   Febrile with leukocytosis.   CXR from 3/25 (I personally reviewed) with bilateral pleural effusions   Now has IRMA with creatinine rising from 1.2->1.7  Exam with crackles on lung exam as well as purulence and tenderness at the peg site   possible sources of fever are pneumonia vs peg site infection vs viral     3/30: Lung sounds are coarse, no wheezes, has cough, PEG removed yesterday, no fevers within last 24 hrs, NC, WBC normalized, Cr better 1.59, LFTs ok, BCs are pending, CT c/a/p is pending. Will continue with abx, Vancomycin IV and Cefepime IV. RVP negative.   UA with nitrates, many bacteria, WBC 11-25K, will order UC, pt denied any urinary symptoms during today's exam, already on abx.   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 Vancomycin level was collected, pending result, MRSA PCR performed and pending. If MRSA PCR is negative, ok to stop vancomycin. Cefepime IV continued. Today is day #3 of abx and if BCs will remain with no growth, will limit abx to 7 days total.   Attending addendum:  agree with above  stop vancomycin if mrsa pcr is negative and no enterococcus detected   continue cefepime. likely can limit antibiotics to 7 days   ensure euvolemic     4/3: no fever, NC, no wbc, Cr ok, BCs with no growth to date, MRSA PCR not detected, vancomycin IV was stopped. Cefepime IV continued - today is day #6, will need one more day of abx.     Plan:  MRSA PCR not detected, Vancomycin IV stopped  continue cefepime based on renal dosing algorithm (day #6), needs one more day to complete 7 days course   follow Blood cultures - NGTD  urine culture - not collected   RVP negative   monitor saturation, chest PT   physical therapy  for Diabetes mellitus ISS as he has a good HbA1c and glucose levels have been appropriate here->avoid any hypoglycemic episodes     Discussed with Dr. Greer

## 2023-04-03 NOTE — PROGRESS NOTE ADULT - ASSESSMENT
83 yo male with likely history of ischemic cardiomyopathy. Also chr AF, CAD s/p PCI 3 years ago, DM2, COPD.  [facility], [facility].  Here for acute dyspnea x one day and productive cough, likely sec to exacerbation of CHF.  No evidence of acute ischemia, but [facility] is [facility] pending at [facility].  Is on life vest; not wearing at present, patient needs education regarding its use prior to discharge, Rebecca Vicente was informed and will contact the Rep prior to discharge  repeat TTE with EF 25-30%    Pt with c/o diffused abd discomfort with Temp of 102.6 R on 3/29 , PEG site found to be inflamed, indurated, Tube removed, started on ABx     Plan:  Telemetry monitoring  Continue  Lasix daily   monitor renal function, electrolytes and daily weights.  Optimize GDMT, currently low BP precludes adding Entresto, bbl /aldactone doses held frequently 2/2 low BP, currently on midodrine for BP support   Symptoms likely worsened by significant anemia, etiology? On Plavix as well as DOAC, this may be chronic anemia, management as per primary team   s/p PEG tube removal, ABx as per ID  [facility] [place], transfer process has been started by medicine team but due to new fever, Dr Rain from [facility] recommended workup and ensuring clinical stability prior to transfer. Pt clinically feeling better now, SBP in 90s -> asymptomatic, no further fever. Consider proceeding with the transfer process  If no transfer occurs and pt is getting discharged from here, pt and family should be educated on use of Life Vest prior to discharge, Rebecca Hernandez can be reached at  for retraining.

## 2023-04-04 LAB
GLUCOSE BLDC GLUCOMTR-MCNC: 120 MG/DL — HIGH (ref 70–99)
GLUCOSE BLDC GLUCOMTR-MCNC: 127 MG/DL — HIGH (ref 70–99)
GLUCOSE BLDC GLUCOMTR-MCNC: 140 MG/DL — HIGH (ref 70–99)
GLUCOSE BLDC GLUCOMTR-MCNC: 172 MG/DL — HIGH (ref 70–99)

## 2023-04-04 PROCEDURE — 99232 SBSQ HOSP IP/OBS MODERATE 35: CPT

## 2023-04-04 PROCEDURE — 99233 SBSQ HOSP IP/OBS HIGH 50: CPT

## 2023-04-04 RX ORDER — SPIRONOLACTONE 25 MG/1
12.5 TABLET, FILM COATED ORAL DAILY
Refills: 0 | Status: DISCONTINUED | OUTPATIENT
Start: 2023-04-05 | End: 2023-04-06

## 2023-04-04 RX ADMIN — ATORVASTATIN CALCIUM 80 MILLIGRAM(S): 80 TABLET, FILM COATED ORAL at 21:59

## 2023-04-04 RX ADMIN — Medication 3 MILLILITER(S): at 23:31

## 2023-04-04 RX ADMIN — POLYETHYLENE GLYCOL 3350 17 GRAM(S): 17 POWDER, FOR SOLUTION ORAL at 11:08

## 2023-04-04 RX ADMIN — Medication 3 MILLILITER(S): at 11:08

## 2023-04-04 RX ADMIN — PREGABALIN 1000 MICROGRAM(S): 225 CAPSULE ORAL at 11:07

## 2023-04-04 RX ADMIN — MIDODRINE HYDROCHLORIDE 10 MILLIGRAM(S): 2.5 TABLET ORAL at 05:51

## 2023-04-04 RX ADMIN — Medication 1 MILLIGRAM(S): at 11:07

## 2023-04-04 RX ADMIN — APIXABAN 2.5 MILLIGRAM(S): 2.5 TABLET, FILM COATED ORAL at 05:49

## 2023-04-04 RX ADMIN — CEFEPIME 100 MILLIGRAM(S): 1 INJECTION, POWDER, FOR SOLUTION INTRAMUSCULAR; INTRAVENOUS at 05:50

## 2023-04-04 RX ADMIN — BUDESONIDE AND FORMOTEROL FUMARATE DIHYDRATE 2 PUFF(S): 160; 4.5 AEROSOL RESPIRATORY (INHALATION) at 05:58

## 2023-04-04 RX ADMIN — Medication 40 MILLIGRAM(S): at 05:56

## 2023-04-04 RX ADMIN — APIXABAN 2.5 MILLIGRAM(S): 2.5 TABLET, FILM COATED ORAL at 17:12

## 2023-04-04 RX ADMIN — BUDESONIDE AND FORMOTEROL FUMARATE DIHYDRATE 2 PUFF(S): 160; 4.5 AEROSOL RESPIRATORY (INHALATION) at 17:13

## 2023-04-04 RX ADMIN — MIDODRINE HYDROCHLORIDE 10 MILLIGRAM(S): 2.5 TABLET ORAL at 11:07

## 2023-04-04 RX ADMIN — CEFEPIME 100 MILLIGRAM(S): 1 INJECTION, POWDER, FOR SOLUTION INTRAMUSCULAR; INTRAVENOUS at 21:58

## 2023-04-04 RX ADMIN — CLOPIDOGREL BISULFATE 75 MILLIGRAM(S): 75 TABLET, FILM COATED ORAL at 11:07

## 2023-04-04 RX ADMIN — MIRTAZAPINE 30 MILLIGRAM(S): 45 TABLET, ORALLY DISINTEGRATING ORAL at 21:59

## 2023-04-04 RX ADMIN — Medication 3 MILLILITER(S): at 17:22

## 2023-04-04 RX ADMIN — CEFEPIME 100 MILLIGRAM(S): 1 INJECTION, POWDER, FOR SOLUTION INTRAMUSCULAR; INTRAVENOUS at 13:01

## 2023-04-04 RX ADMIN — Medication 3 MILLILITER(S): at 05:19

## 2023-04-04 RX ADMIN — TAMSULOSIN HYDROCHLORIDE 0.4 MILLIGRAM(S): 0.4 CAPSULE ORAL at 21:58

## 2023-04-04 RX ADMIN — TIOTROPIUM BROMIDE 2 PUFF(S): 18 CAPSULE ORAL; RESPIRATORY (INHALATION) at 11:08

## 2023-04-04 RX ADMIN — Medication 25 MILLIGRAM(S): at 05:55

## 2023-04-04 NOTE — PROGRESS NOTE ADULT - NS ATTEND AMEND GEN_ALL_CORE FT
agree with above  stop vancomycin if mrsa pcr is negative and no enterococcus detected   continue cefepime. likely can limit antibiotics to 7 days   ensure euvolemic     Brennen Greer, DO  Infectious Disease Attending  Reachable via Microsoft Teams or ID office: [phone]  After 5pm/weekends please call [phone] for all inquiries and new consults
82-year-old with ischemic cardiomyopathy, prior PCI, COPD, diabetes, chronic A-fib, admitted with acute on chronic heart failure reduced EF decompensation, abdominal pain and fever with indurated, inflamed, and infected PEG site currently getting antibiotics.  Continue diuresis.  Marginal blood pressure limits additional guideline directed optimized therapies for heart failure at the present time.  Continue DOAC for A-fib related stroke risk reduction.  Continue aspirin for heart risk reduction.  For LifeVest re-education prior to discharge if patient is not [facility] for [facility].
82-year-old with ischemic cardiomyopathy, prior PCI, COPD, diabetes, chronic A-fib, admitted with acute on chronic heart failure reduced EF decompensation, abdominal pain and fever with indurated, inflamed, and infected PEG site currently getting antibiotics.  Continue diuresis.  Marginal blood pressure limits additional guideline directed optimized therapies for heart failure at the present time.  Continue DOAC for A-fib related stroke risk reduction.  Continue aspirin for heart risk reduction.  For LifeVest re-education prior to discharge if patient is not transferred [facility] the [facility] for further management.
complete antibiotics today   no ID contraindication to discharge  unclear why patient needs to wait for transfer to [facility]-consider d/c home or rehab and follow up with [facility]   patient needs education regarding his lifevest from rep from providing company     Discussed with cardiology     Will sign off. Please call with questions.     Brennen Greer,   Infectious Disease Attending  Reachable via Microsoft Teams or ID office: [phone]  After 5pm/weekends please call [phone] for all inquiries and new consult(s)
CV stable, appears adequately perfused despite lw BP's (likely combination of low cardiac output, anemia and febrile illness).  Strongly advise restarting low dose Entresto prior to discharge, even if it means decreasing dose of diuretics, in order to optimize care.  NO cardiac barriers to transfer to [facility] for [facility].  If going back to nursing home, would have Lifevest reinstituted and education given to patient and family.

## 2023-04-04 NOTE — PROGRESS NOTE ADULT - ASSESSMENT
83 yo male with likely history of ischemic cardiomyopathy. Also chr AF, CAD s/p PCI 3 years ago, DM2, COPD.  [facility], FTT.  Here for acute dyspnea x one day and productive cough, likely sec to exacerbation of CHF.  No evidence of acute ischemia, but cath is reportedly pending at [facility].  Is on life vest; not wearing at present, patient needs education regarding its use prior to discharge, Rebecca Vicente was informed and will contact the Rep prior to discharge  repeat TTE with EF 25-30%    Pt with c/o diffused abd discomfort with Temp of 102.6 R on 3/29/23 , PEG site found to be inflamed, indurated, Tube removed, started on ABx    Plan:  Telemetry monitoring  Continue  Lasix po daily   monitor renal function, electrolytes and daily weights.  Optimize GDMT, currently low BP precludes adding Entresto, bbl /aldactone doses held frequently 2/2 low BP  Symptoms likely worsened by significant anemia, etiology? On Plavix as well as DOAC, this may be chronic anemia, management as per primary team   s/p PEG tube removal, ABx as per ID  Son requesting [facility] to [facility] [place], transfer process has been started by medicine team but due to new fever, Dr Rain from [facility] recommended workup and ensuring clinical stability prior to transfer. Pt clinically feeling better now, SBP in 90s -> asymptomatic, no further fever. Consider proceeding with the transfer process  If no transfer occurs and pt is getting discharged from here, pt and family should be educated on use of Life Vest prior to discharge, Rebecca Hernandez can be reached at  for retraining.  81 yo male with likely history of ischemic cardiomyopathy. Also chr AF, CAD s/p PCI 3 years ago, DM2, COPD.  [facility], pneumonia, FTT.  Here for acute dyspnea x one day and productive cough, likely sec to exacerbation of CHF.  No evidence of acute ischemia, but cath is reportedly pending at [facility].  Is on life vest; not wearing at present, patient needs education regarding its use prior to discharge, Rebecca Vicente was informed and will contact the Rep prior to discharge  repeat TTE with EF 25-30%    Pt with c/o diffused abd discomfort with Temp of 102.6 R on 3/29/23 , PEG site found to be inflamed, indurated, Tube removed, started on ABx    Plan:  Telemetry monitoring  Continue  Lasix po daily   monitor renal function, electrolytes and daily weights.  Optimize GDMT, cont bbl, aldactone. Strongly recommend restarting low dose Entresto prior to discharge, even if it means decreasing dose of diuretics, in order to optimize care.  Symptoms likely worsened by significant anemia, etiology? On Plavix as well as DOAC, this may be chronic anemia, management as per primary team   s/p PEG tube removal, ABx as per ID  Son requesting transfer to [facility] in [place], transfer process has been started by medicine team but due to new fever, Dr Rain from [facility] recommended workup and ensuring clinical stability prior to transfer. NO cardiac barriers to transfer to [facility] for [facility]. SBP in 90s -> asymptomatic, no further fever.   If no transfer occurs and pt is getting discharged from here, pt and family should be educated on use of Life Vest prior to discharge, Rebecca Hernandez can be reached at  for retraining.

## 2023-04-04 NOTE — PROGRESS NOTE ADULT - SUBJECTIVE AND OBJECTIVE BOX
Pt much improved  abd wound considerably improved      MEDICATIONS  (STANDING):  albuterol    0.083%. 2.5 milliGRAM(s) Nebulizer once  albuterol/ipratropium for Nebulization 3 milliLiter(s) Nebulizer every 6 hours  apixaban 2.5 milliGRAM(s) Oral two times a day  atorvastatin 80 milliGRAM(s) Oral at bedtime  budesonide 160 MICROgram(s)/formoterol 4.5 MICROgram(s) Inhaler 2 Puff(s) Inhalation two times a day  cefepime   IVPB      cefepime   IVPB 1000 milliGRAM(s) IV Intermittent every 8 hours  clopidogrel Tablet 75 milliGRAM(s) Oral daily  cyanocobalamin 1000 MICROGram(s) Oral daily  dextrose 5%. 1000 milliLiter(s) (50 mL/Hr) IV Continuous <Continuous>  dextrose 5%. 1000 milliLiter(s) (100 mL/Hr) IV Continuous <Continuous>  dextrose 50% Injectable 25 Gram(s) IV Push once  dextrose 50% Injectable 12.5 Gram(s) IV Push once  dextrose 50% Injectable 25 Gram(s) IV Push once  folic acid 1 milliGRAM(s) Oral daily  furosemide    Tablet 40 milliGRAM(s) Oral daily  glucagon  Injectable 1 milliGRAM(s) IntraMuscular once  insulin lispro (ADMELOG) corrective regimen sliding scale   SubCutaneous three times a day before meals  insulin lispro (ADMELOG) corrective regimen sliding scale   SubCutaneous at bedtime  metoprolol succinate ER 25 milliGRAM(s) Oral daily  midodrine. 10 milliGRAM(s) Oral three times a day  mirtazapine 30 milliGRAM(s) Oral at bedtime  polyethylene glycol 3350 17 Gram(s) Oral daily  spironolactone 25 milliGRAM(s) Oral daily  tamsulosin 0.4 milliGRAM(s) Oral at bedtime  tiotropium 2.5 MICROgram(s) Inhaler 2 Puff(s) Inhalation daily    MEDICATIONS  (PRN):  acetaminophen     Tablet .. 650 milliGRAM(s) Oral every 6 hours PRN Temp greater or equal to 38C (100.4F), Mild Pain (1 - 3)  aluminum hydroxide/magnesium hydroxide/simethicone Suspension 30 milliLiter(s) Oral every 4 hours PRN Dyspepsia  dextrose Oral Gel 15 Gram(s) Oral once PRN Blood Glucose LESS THAN 70 milliGRAM(s)/deciliter  melatonin 3 milliGRAM(s) Oral at bedtime PRN Insomnia  ondansetron Injectable 4 milliGRAM(s) IV Push every 8 hours PRN Nausea and/or Vomiting      Allergies    Hytrin (Unknown)  peanuts (Unknown)  rabeprazole (Unknown)  yellow dye (Hives)    Intolerances        Vital Signs Last 24 Hrs  T(C): 36.4 (04 Apr 2023 04:33), Max: 37.3 (03 Apr 2023 11:05)  T(F): 97.6 (04 Apr 2023 04:33), Max: 99.1 (03 Apr 2023 11:05)  HR: 85 (04 Apr 2023 05:59) (79 - 94)  BP: 97/60 (04 Apr 2023 05:59) (91/55 - 97/64)  BP(mean): --  RR: 18 (04 Apr 2023 04:33) (18 - 18)  SpO2: 95% (04 Apr 2023 05:59) (95% - 100%)    Parameters below as of 04 Apr 2023 05:57  Patient On (Oxygen Delivery Method): nasal cannula        PHYSICAL EXAM:  General: NAD.  CVS: S1, S2  Chest: air entry bilaterally present  Abd: BS present, soft, non-tender, old peg site clean and dry      LABS:                        7.9    5.30  )-----------( 206      ( 03 Apr 2023 06:45 )             26.0     04-03    142  |  114<H>  |  13  ----------------------------<  91  3.7   |  24  |  1.15    Ca    8.6      03 Apr 2023 06:45  Mg     2.4     04-03        stable from GI standpoint  no bleeding  follow labs as outpatient  Please re-consult GI if needed

## 2023-04-04 NOTE — PROGRESS NOTE ADULT - NS ATTEND OPT1 GEN_ALL_CORE

## 2023-04-04 NOTE — PROGRESS NOTE ADULT - ASSESSMENT
83 y/o M w/ PMHx of CHF, COPD, Prostate Ca, DM type 2, Atrial Flutter on Eliquis, EMANUEL, recently admitted at the [facility] for 4 weeks for PNA, CHF, s/p RT for his Prostate as well, discharged on Life Vest, sent in from  rehab for SOB.   Febrile with leukocytosis.   CXR from 3/25 (I personally reviewed) with bilateral pleural effusions   Now has IRMA with creatinine rising from 1.2->1.7  Exam with crackles on lung exam as well as purulence and tenderness at the peg site   possible sources of fever are pneumonia vs peg site infection vs viral     3/30: Lung sounds are coarse, no wheezes, has cough, PEG removed yesterday, no fevers within last 24 hrs, NC, WBC normalized, Cr better 1.59, LFTs ok, BCs are pending, CT c/a/p is pending. Will continue with abx, Vancomycin IV and Cefepime IV. RVP negative.   UA with nitrates, many bacteria, WBC 11-25K, will order UC, pt denied any urinary symptoms during today's exam, already on abx.   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 Vancomycin level was collected, pending result, MRSA PCR performed and pending. If MRSA PCR is negative, ok to stop vancomycin. Cefepime IV continued. Today is day #3 of abx and if BCs will remain with no growth, will limit abx to 7 days total.   Attending addendum:  agree with above  stop vancomycin if mrsa pcr is negative and no enterococcus detected   continue cefepime. likely can limit antibiotics to 7 days   ensure euvolemic     4/3: no fever, NC, no wbc, Cr ok, BCs with no growth to date, MRSA PCR not detected, vancomycin IV was stopped. Cefepime IV continued - today is day #6, will need one more day of abx.   4/4: will complete pt's course of abx today, no fevers, NC, no new lab work, culture data reviewed.     Plan:  stop cefepime after today's doses  follow Blood cultures - NGTD  RVP negative   monitor saturation, chest PT   physical therapy  good but not too tight glycemic control     will sign off, re-consult as needed     Discussed with Dr. Greer  Discussed with Dr. Tierney  Discussed with the pt and his son

## 2023-04-04 NOTE — PROGRESS NOTE ADULT - SUBJECTIVE AND OBJECTIVE BOX
Patient is a 82y old  Male who presents with a chief complaint of sob    PAST MEDICAL & SURGICAL HISTORY:  Chronic CHF    Prostate cancer s/p radiation     Type 2 diabetes mellitus    Chronic kidney disease (CKD)    EMANUEL on CPAP    HLD (hyperlipidemia)    History of COPD    HTN (hypertension)    CAD s/p PCI    INTERVAL HISTORY: resting in bed in no acute distress   	  MEDICATIONS:  MEDICATIONS  (STANDING):  albuterol    0.083%. 2.5 milliGRAM(s) Nebulizer once  albuterol/ipratropium for Nebulization 3 milliLiter(s) Nebulizer every 6 hours  apixaban 2.5 milliGRAM(s) Oral two times a day  atorvastatin 80 milliGRAM(s) Oral at bedtime  budesonide 160 MICROgram(s)/formoterol 4.5 MICROgram(s) Inhaler 2 Puff(s) Inhalation two times a day  cefepime   IVPB 1000 milliGRAM(s) IV Intermittent every 8 hours  clopidogrel Tablet 75 milliGRAM(s) Oral daily  cyanocobalamin 1000 MICROGram(s) Oral daily  folic acid 1 milliGRAM(s) Oral daily  furosemide    Tablet 40 milliGRAM(s) Oral daily  insulin lispro (ADMELOG) corrective regimen sliding scale   SubCutaneous three times a day before meals  insulin lispro (ADMELOG) corrective regimen sliding scale   SubCutaneous at bedtime  metoprolol succinate ER 25 milliGRAM(s) Oral daily  mirtazapine 30 milliGRAM(s) Oral at bedtime  polyethylene glycol 3350 17 Gram(s) Oral daily  tamsulosin 0.4 milliGRAM(s) Oral at bedtime  tiotropium 2.5 MICROgram(s) Inhaler 2 Puff(s) Inhalation daily    MEDICATIONS  (PRN):  acetaminophen     Tablet .. 650 milliGRAM(s) Oral every 6 hours PRN Temp greater or equal to 38C (100.4F), Mild Pain (1 - 3)  aluminum hydroxide/magnesium hydroxide/simethicone Suspension 30 milliLiter(s) Oral every 4 hours PRN Dyspepsia  dextrose Oral Gel 15 Gram(s) Oral once PRN Blood Glucose LESS THAN 70 milliGRAM(s)/deciliter  melatonin 3 milliGRAM(s) Oral at bedtime PRN Insomnia  ondansetron Injectable 4 milliGRAM(s) IV Push every 8 hours PRN Nausea and/or Vomiting    Vitals:  T(F): 98.1 (04-04-23 @ 10:21), Max: 98.4 (04-03-23 @ 23:55)  HR: 79 (04-04-23 @ 11:05) (79 - 94)  BP: 94/61 (04-04-23 @ 10:21) (91/55 - 97/60)  RR: 18 (04-04-23 @ 10:21) (18 - 18)  SpO2: 98% (04-04-23 @ 11:05) (95% - 98%)    04-03 @ 07:01  -  04-04 @ 07:00  --------------------------------------------------------  IN:  Total IN: 0 mL    OUT:    Voided (mL): 300 mL  Total OUT: 300 mL    Total NET: -300 mL    PHYSICAL EXAM:  Neuro: Awake, responsive  CV: S1 S2 RRR  Lungs: diminished to bases   GI: Soft, BS +, ND, NT  Extremities: No edema    TELEMETRY: sinus    RADIOLOGY:   < from: CT Chest No Cont (03.30.23 @ 19:05) >  Left lower lobe consolidation, likely pneumonia. Recommend follow-up in   6-8 weeks to assess for improvement.    Small pleural effusions.    < end of copied text >    DIAGNOSTIC TESTING:    [x ] Echocardiogram:   < from: TTE Echo Complete w/o Contrast w/ Doppler (03.28.23 @ 13:46) >  Left Ventricle: The left ventricle was not well visualized.  Global LV systolic function was severely decreased, on limited views.   Left ventricular ejection fraction, by visual estimation, is 25 to 30%.   Suboptimal endocardial border definition noted, suggest contrast echo for   further evaluation, if clinically warranted.  Right Ventricle: Normal right ventricular size and function.  Left Atrium: The left atrium is normal in size.  Right Atrium: The right atrium is normal in size.  Pericardium: There is no evidence of pericardial effusion.  Mitral Valve: Structurally normal mitral valve, with normal leaflet   excursion. There is mild mitral annular calcification. Mild mitral valve   regurgitation is seen.  Tricuspid Valve: Structurally normal tricuspid valve, with normal leaflet   excursion. Mild tricuspid regurgitation is visualized.  Aortic Valve: Normal trileaflet aortic valve with normal opening. No   evidence of aortic valve regurgitation is seen.  Pulmonic Valve: Structurally normal pulmonic valve, with normal leaflet   excursion. No indication of pulmonicvalve regurgitation.  Aorta: The aortic root and ascending aorta are structurally normal, with   no evidence of dilitation.  Pulmonary Artery: The main pulmonary artery is normal in size.      Summary:   1. Left ventricular ejection fraction, by visual estimation, is 25 to   30%.   2. Suboptimal endocardial border definition noted, suggest contrast echo   for further evaluation, if clinically warranted.   3. Mild mitral annular calcification.   4. Mild mitral valve regurgitation.   5. Mild tricuspid regurgitation.    < end of copied text >    LABS:	 	    03 Apr 2023 06:45    142    |  114    |  13     ----------------------------<  91     3.7     |  24     |  1.15   02 Apr 2023 07:05    141    |  114    |  14     ----------------------------<  107    3.8     |  24     |  1.16     Ca    8.6        03 Apr 2023 06:45  Mg     2.4       03 Apr 2023 06:45                        7.9    5.30  )-----------( 206      ( 03 Apr 2023 06:45 )             26.0 ,                       8.0    5.29  )-----------( 203      ( 02 Apr 2023 07:05 )             25.7

## 2023-04-04 NOTE — PROGRESS NOTE ADULT - ASSESSMENT
81 y/o male w/ PMHx of CHF w/ ReF, COPD, Prostate Ca, DM type 2, CAD s/p MI Atrial Flutter on Eliquis, [facility], [facility]F, s/p RT for his Prostate as well, discharged on Life Vest, sent in from  rehab for SOB. Pt admitted for acute on chronic systolic CHF      # Fever with hypotension   - 102+ rectal---now afebrile but still hypotensive (per discussion with cardiology - likely baseline low BP given HF)   - suspect 2/2 PEG site infection, s/p PEG removal by surgery   - ID called, c/w Vanc and Cefepime--off Vancomycin since MRSA negative, continue only Cefepime to complete a 7 day course   - f/u BCx-NGTD  - judicious fluids   - continue midodrine 10mg, BP on lower , pt asymptomatic----will continue to monitor for now, bp might still be low from infection? pt might need bit of fluid (since more on a dry side)       # HF w/ ReF  # Acute on chronic systolic CHF  - start Lasix 40mg IV BID (on hold due to bp)  - toprol 25 mg po daily spironolactone 25 mg po daily (on hold due to hypotesnion)   -Farxiga on hold (will reconsider until discharge)   - ECHO with EF 25-30%, cards following, brianne recs  - Strict I/Os, daily wts    # CAD s/p stents  - c/w plavix, statin    # Atrial Flutter  - c/w BB  - c/w Eliquis      # Anemia  - Pt w/ hx of Anemia, BL H/H unknown  - no reported BRBPR or melana  - c/w Folic acid, B12    # DM type 2  - Pt not [facility] any med[facility]  - FS qAC and HS w/ SSI      # COPD  - c/w Inhalers      # Prostate Ca  - Pt reports he is s/p 28 day RT  - On Orgovux; nonformulary; pls have family bring in        # DVT ppx - c/w Eliquis    Pt is full code    Dispo: family wants to pt to be transferred to [facility] Dr. Rain ([phone]), Patient appears more stable. Called  Dr. Rain x 2 at 11:40 am on 4/4. Please follow up tomorrow  to resume transfer as  pt more stable.   83 y/o male w/ PMHx of CHF w/ ReF, COPD, Prostate Ca, DM type 2, CAD s/p MI Atrial Flutter on Eliquis, [facility], CHF, s/p RT for his Prostate as well, discharged on Life Vest, sent in from  rehab for SOB. Pt admitted for acute on chronic systolic CHF      # Fever with hypotension   - 102+ rectal---now afebrile but still hypotensive (per discussion with cardiology - likely baseline low BP given HF)   - suspect 2/2 PEG site infection, s/p PEG removal by surgery   - ID called, c/w Vanc and Cefepime--off Vancomycin since MRSA negative, continue only Cefepime to complete a 7 day course - last day is today 4/4   - f/u BCx-NGTD  - judicious fluids   - continue midodrine 10mg, BP on lower , pt asymptomatic----will continue to monitor for now, bp might still be low from infection? pt might need bit of fluid (since more on a dry side)       # HF w/ ReF  # Acute on chronic systolic CHF  - start Lasix 40mg IV BID (on hold due to bp) _. now on lasix 40mg PO   - toprol 25 mg po daily spironolactone 25 mg po daily; decreased Spironolactone to 12.5mg Daily   -Farxiga on hold (will reconsider until discharge)   - ECHO with EF 25-30%, cards following, brianne recs  [ ] Monitor BP, and start entresto as tolerated - cardiology recommended to decrease lasix to allow room for entresto   - Strict I/Os, daily wts    # CAD s/p stents  - c/w plavix, statin    # Atrial Flutter  - c/w BB  - c/w Eliquis      # Anemia  - Pt w/ hx of Anemia, BL H/H unknown  - no reported BRBPR or melana  - c/w Folic acid, B12    # DM type 2  - Pt not [facility]  - FS qAC and HS w/ SSI      # COPD  - c/w Inhalers      # Prostate Ca  - Pt reports he is s/p 28 day RT  - On Orgovux; nonformulary; pls have family bring in        # DVT ppx - c/w Eliquis    Pt is full code    Dispo: family wants to pt to be transferred to [facility] Dr. Rain ([phone]), Patient appears more stable. Called  Dr. Rain x 2 at 11:40 am on 4/4. Please follow up tomorrow  to resume transfer as  pt more stable.

## 2023-04-04 NOTE — PROGRESS NOTE ADULT - SUBJECTIVE AND OBJECTIVE BOX
[facility]  Division of Hospital Medicine  Ericka Tierney MD  Pager: [phone]      Patient is a 82y old  Male who presents with a chief complaint of Acute on Chronic CHF (04 Apr 2023 08:50)      SUBJECTIVE / OVERNIGHT EVENTS: Patient examined at bedside. Frustrated with prolonged hospitalization. Eager to leave the hospital. Denies chest pain or SOB. No Lightheadness or dizziness. Spoke with cardiology PA states that patient blood pressure baseline low. Recommends transfer   ADDITIONAL REVIEW OF SYSTEMS:    MEDICATIONS  (STANDING):  albuterol    0.083%. 2.5 milliGRAM(s) Nebulizer once  albuterol/ipratropium for Nebulization 3 milliLiter(s) Nebulizer every 6 hours  apixaban 2.5 milliGRAM(s) Oral two times a day  atorvastatin 80 milliGRAM(s) Oral at bedtime  budesonide 160 MICROgram(s)/formoterol 4.5 MICROgram(s) Inhaler 2 Puff(s) Inhalation two times a day  cefepime   IVPB      cefepime   IVPB 1000 milliGRAM(s) IV Intermittent every 8 hours  clopidogrel Tablet 75 milliGRAM(s) Oral daily  cyanocobalamin 1000 MICROGram(s) Oral daily  dextrose 5%. 1000 milliLiter(s) (50 mL/Hr) IV Continuous <Continuous>  dextrose 5%. 1000 milliLiter(s) (100 mL/Hr) IV Continuous <Continuous>  dextrose 50% Injectable 25 Gram(s) IV Push once  dextrose 50% Injectable 12.5 Gram(s) IV Push once  dextrose 50% Injectable 25 Gram(s) IV Push once  folic acid 1 milliGRAM(s) Oral daily  furosemide    Tablet 40 milliGRAM(s) Oral daily  glucagon  Injectable 1 milliGRAM(s) IntraMuscular once  insulin lispro (ADMELOG) corrective regimen sliding scale   SubCutaneous three times a day before meals  insulin lispro (ADMELOG) corrective regimen sliding scale   SubCutaneous at bedtime  metoprolol succinate ER 25 milliGRAM(s) Oral daily  mirtazapine 30 milliGRAM(s) Oral at bedtime  polyethylene glycol 3350 17 Gram(s) Oral daily  spironolactone 25 milliGRAM(s) Oral daily  tamsulosin 0.4 milliGRAM(s) Oral at bedtime  tiotropium 2.5 MICROgram(s) Inhaler 2 Puff(s) Inhalation daily    MEDICATIONS  (PRN):  acetaminophen     Tablet .. 650 milliGRAM(s) Oral every 6 hours PRN Temp greater or equal to 38C (100.4F), Mild Pain (1 - 3)  aluminum hydroxide/magnesium hydroxide/simethicone Suspension 30 milliLiter(s) Oral every 4 hours PRN Dyspepsia  dextrose Oral Gel 15 Gram(s) Oral once PRN Blood Glucose LESS THAN 70 milliGRAM(s)/deciliter  melatonin 3 milliGRAM(s) Oral at bedtime PRN Insomnia  ondansetron Injectable 4 milliGRAM(s) IV Push every 8 hours PRN Nausea and/or Vomiting      CAPILLARY BLOOD GLUCOSE      POCT Blood Glucose.: 120 mg/dL (04 Apr 2023 11:22)  POCT Blood Glucose.: 127 mg/dL (04 Apr 2023 07:28)  POCT Blood Glucose.: 83 mg/dL (03 Apr 2023 20:20)  POCT Blood Glucose.: 81 mg/dL (03 Apr 2023 16:32)    I&O's Summary    03 Apr 2023 07:01  -  04 Apr 2023 07:00  --------------------------------------------------------  IN: 0 mL / OUT: 300 mL / NET: -300 mL        PHYSICAL EXAM:  Vital Signs Last 24 Hrs  T(C): 36.7 (04 Apr 2023 10:21), Max: 36.9 (03 Apr 2023 23:55)  T(F): 98.1 (04 Apr 2023 10:21), Max: 98.4 (03 Apr 2023 23:55)  HR: 81 (04 Apr 2023 10:21) (79 - 94)  BP: 94/61 (04 Apr 2023 10:21) (91/55 - 97/64)  BP(mean): --  RR: 18 (04 Apr 2023 10:21) (18 - 18)  SpO2: 95% (04 Apr 2023 10:21) (95% - 100%)    Parameters below as of 04 Apr 2023 10:21  Patient On (Oxygen Delivery Method): nasal cannula      CONSTITUTIONAL: NAD, well-developed, well-groomed  EYES: PERRLA; conjunctiva and sclera clear  ENMT: Moist oral mucosa, no pharyngeal injection or exudates; normal dentition  NECK: Supple, no palpable masses; no thyromegaly  RESPIRATORY: Normal respiratory effort; lungs are clear to auscultation bilaterally  CARDIOVASCULAR: Regular rate and rhythm, normal S1 and S2, no murmur/rub/gallop; No lower extremity edema; Peripheral pulses are 2+ bilaterally  ABDOMEN: Nontender to palpation, normoactive bowel sounds, no rebound/guarding; No hepatosplenomegaly  MUSCULOSKELETAL:  Normal gait; no clubbing or cyanosis of digits; no joint swelling or tenderness to palpation  PSYCH: A+O to person, place, and time; affect appropriate  NEUROLOGY: CN 2-12 are intact and symmetric; no gross sensory deficits   SKIN: No rashes; no palpable lesions    LABS:                        7.9    5.30  )-----------( 206      ( 03 Apr 2023 06:45 )             26.0     04-03    142  |  114<H>  |  13  ----------------------------<  91  3.7   |  24  |  1.15    Ca    8.6      03 Apr 2023 06:45  Mg     2.4     04-03                  RADIOLOGY & ADDITIONAL TESTS:  Results Reviewed:   Imaging Personally Reviewed:  Electrocardiogram Personally Reviewed:    COORDINATION OF CARE:  Care Discussed with Consultants/Other Providers [Y/N]:  Prior or Outpatient Records Reviewed [Y/N]:

## 2023-04-04 NOTE — CHART NOTE - NSCHARTNOTEFT_GEN_A_CORE
Chart reviewed and events to date noted. Patient on active PT programme since March 27 and recommended for Short Term Rehab. Clarified duplicated order c Dr. Tierney. Order completed.

## 2023-04-04 NOTE — PROGRESS NOTE ADULT - SUBJECTIVE AND OBJECTIVE BOX
KENDRICK WEBER  MRN-[id number]    Follow Up:  PNA, possible PEG site infection     Interval History: the pt was seen and examined earlier, no acute distress, asking [facility], pt's son is present. Pt is afebrile, NC, no new lab work.,     PAST MEDICAL & SURGICAL HISTORY:  Chronic CHF      Prostate cancer      Type 2 diabetes mellitus      Chronic kidney disease (CKD)      EMANUEL on CPAP      HLD (hyperlipidemia)      History of COPD      HTN (hypertension)      No significant past surgical history          ROS:    [ ] Unobtainable because:  [x ] All other systems negative    Constitutional: no fever, no chills  Head: no trauma  Eyes: no vision changes, no eye pain  ENT:  no sore throat, no rhinorrhea  Cardiovascular:  no chest pain, no palpitation  Respiratory:  no SOB, no cough  GI:  no abd pain, no vomiting, no diarrhea  urinary: no dysuria, no hematuria, no flank pain  musculoskeletal:  no joint pain, no joint swelling  skin:  no rash  neurology:  no headache, no seizure, no change in mental status  psych: no anxiety, no depression         Allergies  Hytrin (Unknown)  peanuts (Unknown)  rabeprazole (Unknown)  yellow dye (Hives)        ANTIMICROBIALS:  cefepime   IVPB    cefepime   IVPB 1000 every 8 hours      OTHER MEDS:  acetaminophen     Tablet .. 650 milliGRAM(s) Oral every 6 hours PRN  albuterol    0.083%. 2.5 milliGRAM(s) Nebulizer once  albuterol/ipratropium for Nebulization 3 milliLiter(s) Nebulizer every 6 hours  aluminum hydroxide/magnesium hydroxide/simethicone Suspension 30 milliLiter(s) Oral every 4 hours PRN  apixaban 2.5 milliGRAM(s) Oral two times a day  atorvastatin 80 milliGRAM(s) Oral at bedtime  budesonide 160 MICROgram(s)/formoterol 4.5 MICROgram(s) Inhaler 2 Puff(s) Inhalation two times a day  clopidogrel Tablet 75 milliGRAM(s) Oral daily  cyanocobalamin 1000 MICROGram(s) Oral daily  dextrose 5%. 1000 milliLiter(s) IV Continuous <Continuous>  dextrose 5%. 1000 milliLiter(s) IV Continuous <Continuous>  dextrose 50% Injectable 25 Gram(s) IV Push once  dextrose 50% Injectable 12.5 Gram(s) IV Push once  dextrose 50% Injectable 25 Gram(s) IV Push once  dextrose Oral Gel 15 Gram(s) Oral once PRN  folic acid 1 milliGRAM(s) Oral daily  furosemide    Tablet 40 milliGRAM(s) Oral daily  glucagon  Injectable 1 milliGRAM(s) IntraMuscular once  insulin lispro (ADMELOG) corrective regimen sliding scale   SubCutaneous three times a day before meals  insulin lispro (ADMELOG) corrective regimen sliding scale   SubCutaneous at bedtime  melatonin 3 milliGRAM(s) Oral at bedtime PRN  metoprolol succinate ER 25 milliGRAM(s) Oral daily  mirtazapine 30 milliGRAM(s) Oral at bedtime  ondansetron Injectable 4 milliGRAM(s) IV Push every 8 hours PRN  polyethylene glycol 3350 17 Gram(s) Oral daily  tamsulosin 0.4 milliGRAM(s) Oral at bedtime  tiotropium 2.5 MICROgram(s) Inhaler 2 Puff(s) Inhalation daily      Vital Signs Last 24 Hrs  T(C): 37.1 (04 Apr 2023 15:42), Max: 37.1 (04 Apr 2023 15:42)  T(F): 98.7 (04 Apr 2023 15:42), Max: 98.7 (04 Apr 2023 15:42)  HR: 77 (04 Apr 2023 17:03) (77 - 94)  BP: 98/65 (04 Apr 2023 15:42) (91/55 - 98/65)  BP(mean): --  RR: 18 (04 Apr 2023 15:42) (18 - 18)  SpO2: 95% (04 Apr 2023 17:03) (94% - 98%)    Parameters below as of 04 Apr 2023 15:42  Patient On (Oxygen Delivery Method): nasal cannula  O2 Flow (L/min): 2      Physical Exam:  Constitutional: non-toxic, no distress, on RA and comfortable   HEAD/EYES: anicteric, no conjunctival injection  ENT:  supple, no thrush  Cardiovascular:   normal S1, S2, no murmur, +edema  Respiratory: diminished breath sounds bilaterally, no wheezes   GI:  soft, normal bowel sounds, obese, PEG was removed, site is clean, no noted erythema of surrounding tissues, no significant drainage from the opening, no pus  :  no leigh, no CVA tenderness  Musculoskeletal:  no synovitis  Neurologic: awake and alert to person place time and situation, 3/5 strength in lower extremity bilateral , no focal findings  Skin:  no rash, no erythema, no phlebitis  Heme/Onc: no lymphadenopathy   Psychiatric:  awake, alert, appropriate     WBC Count: 5.30 K/uL (04-03 @ 06:45)  WBC Count: 5.29 K/uL (04-02 @ 07:05)  WBC Count: 5.26 K/uL (04-01 @ 05:48)  WBC Count: 7.05 K/uL (03-30 @ 06:52)  WBC Count: 12.66 K/uL (03-29 @ 14:35)  WBC Count: 6.91 K/uL (03-29 @ 05:32)                            7.9    5.30  )-----------( 206      ( 03 Apr 2023 06:45 )             26.0       04-03    142  |  114<H>  |  13  ----------------------------<  91  3.7   |  24  |  1.15    Ca    8.6      03 Apr 2023 06:45  Mg     2.4     04-03            Creatinine Trend: 1.15<--, 1.16<--, 1.16<--, 1.59<--, 1.70<--, 1.45<--      MICROBIOLOGY:  v  .Blood Blood-Peripheral  03-29-23   No Growth Final  --  --      .Blood Blood-Peripheral  03-29-23   No Growth Final  --  --    [facility] (03-30 @ 11:30)    Procalcitonin, Serum: 2.42 (03-30-23 @ 06:52)    SARS-CoV-2: Mckay (30 Mar 2023 11:30)    RADIOLOGY:

## 2023-04-04 NOTE — CHART NOTE - NSCHARTNOTEFT_GEN_A_CORE
Per chart pt with PMH of CHF, COPD, Prostate CA, T2DM, Atrial Flutter on Eliquis, PEG tube, EMANUEL, sent in from rehab for SOB, nonproductive wet cough related to recent PNA, mild L sided chest tightness, found with CHF exacerbation. Course complicated by fevers; s/p sepsis workup and PEG removal.     Factors impacting intake: [ ] none [ ] nausea  [ ] vomiting [ ] diarrhea [ ] constipation  [ ]chewing problems [ ] swallowing issues  [x] other: variable appetite    Diet Prescription: Diet, Consistent Carbohydrate w/Evening Snack:   1500mL Fluid Restriction ([id number])  Low Sodium  Supplement Feeding Modality:  Oral  Glucerna Shake Cans or Servings Per Day:  1       Frequency:  Three Times a day (03-31-23 @ 16:48)    Intake: Pt with variable intake- reports fair appetite; observed untouched breakfast at time of visit however pt stated he was "going to eat soon". Pt reports he is consuming Glucerna x 3/day (provides 660 kcal, 30 g protein) and is tolerating the diet- Denies difficulty chewing/swallowing.     Current Weight: (03/25) 101.3kg (04/04) 100.4kg  % Weight Change: Weight stable    No noted edema as per flow sheets.     Physical Appearance: Nutrition focused physical exam conducted:  Subcutaneous fat loss: [ mild] Orbital fat pads region, [mild ]Buccal fat region, [mild ]Triceps region,  [unable ]Ribs region.  Muscle wasting: [ mild]Temples region, [mild ]Clavicle region, [ unable]Shoulder region, [unable ]Scapula region, [unable ]Interosseous region,  [unable ]thigh region, [unable ]Calf region    Pertinent Medications: MEDICATIONS  (STANDING):  albuterol    0.083%. 2.5 milliGRAM(s) Nebulizer once  albuterol/ipratropium for Nebulization 3 milliLiter(s) Nebulizer every 6 hours  apixaban 2.5 milliGRAM(s) Oral two times a day  atorvastatin 80 milliGRAM(s) Oral at bedtime  budesonide 160 MICROgram(s)/formoterol 4.5 MICROgram(s) Inhaler 2 Puff(s) Inhalation two times a day  cefepime   IVPB      cefepime   IVPB 1000 milliGRAM(s) IV Intermittent every 8 hours  clopidogrel Tablet 75 milliGRAM(s) Oral daily  cyanocobalamin 1000 MICROGram(s) Oral daily  dextrose 5%. 1000 milliLiter(s) (50 mL/Hr) IV Continuous <Continuous>  dextrose 5%. 1000 milliLiter(s) (100 mL/Hr) IV Continuous <Continuous>  dextrose 50% Injectable 25 Gram(s) IV Push once  dextrose 50% Injectable 12.5 Gram(s) IV Push once  dextrose 50% Injectable 25 Gram(s) IV Push once  folic acid 1 milliGRAM(s) Oral daily  furosemide    Tablet 40 milliGRAM(s) Oral daily  glucagon  Injectable 1 milliGRAM(s) IntraMuscular once  insulin lispro (ADMELOG) corrective regimen sliding scale   SubCutaneous three times a day before meals  insulin lispro (ADMELOG) corrective regimen sliding scale   SubCutaneous at bedtime  metoprolol succinate ER 25 milliGRAM(s) Oral daily  midodrine. 10 milliGRAM(s) Oral three times a day  mirtazapine 30 milliGRAM(s) Oral at bedtime  polyethylene glycol 3350 17 Gram(s) Oral daily  spironolactone 25 milliGRAM(s) Oral daily  tamsulosin 0.4 milliGRAM(s) Oral at bedtime  tiotropium 2.5 MICROgram(s) Inhaler 2 Puff(s) Inhalation daily    MEDICATIONS  (PRN):  acetaminophen     Tablet .. 650 milliGRAM(s) Oral every 6 hours PRN Temp greater or equal to 38C (100.4F), Mild Pain (1 - 3)  aluminum hydroxide/magnesium hydroxide/simethicone Suspension 30 milliLiter(s) Oral every 4 hours PRN Dyspepsia  dextrose Oral Gel 15 Gram(s) Oral once PRN Blood Glucose LESS THAN 70 milliGRAM(s)/deciliter  melatonin 3 milliGRAM(s) Oral at bedtime PRN Insomnia  ondansetron Injectable 4 milliGRAM(s) IV Push every 8 hours PRN Nausea and/or Vomiting    Pertinent Labs: 04-03 Na142 mmol/L Glu 91 mg/dL K+ 3.7 mmol/L Cr  1.15 mg/dL BUN 13 mg/dL 03-30 Phos 4.1 mg/dL 04-02 Alb 2.0 g/dL<L>    03-26-23 @ 07:00  A1C 5.8    Skin: no pressure injuries as per flow sheets    Estimated Needs:   [x] no change since previous assessment: 03/29  [ ] recalculated:     Previous Nutrition Diagnosis:   [x] Malnutrition	Moderate malnutrition in the context of chronic illness  Etiology	Inadequate protein-energy intake in setting of CHF, prostate CA, COPD  Signs/Symptoms	Physical findings of mild and moderate muscle wasting and fat loss  Goal/Expected Outcome	Pt to consume >75% meals/supplements during LOS (not consistently met)      Nutrition Diagnosis is [x] ongoing  [ ] resolved [ ] not applicable     New Nutrition Diagnosis: [x] not applicable      Interventions:   Recommend  [x] Continue current diet as ordered  [ ] Change Diet To:  [ ] Nutrition Supplement  [ ] Nutrition Support  [x] Other: Continue to provide assistance and encouragement with PO intake     Monitoring and Evaluation:   [x] PO intake [ x ] Tolerance to diet prescription [ x ] weights [ x ] labs[ x ] follow up per protocol  [ ] other:

## 2023-04-05 LAB
FLUAV AG NPH QL: SIGNIFICANT CHANGE UP
FLUBV AG NPH QL: SIGNIFICANT CHANGE UP
GLUCOSE BLDC GLUCOMTR-MCNC: 124 MG/DL — HIGH (ref 70–99)
GLUCOSE BLDC GLUCOMTR-MCNC: 131 MG/DL — HIGH (ref 70–99)
GLUCOSE BLDC GLUCOMTR-MCNC: 132 MG/DL — HIGH (ref 70–99)
GLUCOSE BLDC GLUCOMTR-MCNC: 146 MG/DL — HIGH (ref 70–99)
SARS-COV-2 RNA SPEC QL NAA+PROBE: SIGNIFICANT CHANGE UP

## 2023-04-05 PROCEDURE — 99233 SBSQ HOSP IP/OBS HIGH 50: CPT

## 2023-04-05 PROCEDURE — 99232 SBSQ HOSP IP/OBS MODERATE 35: CPT

## 2023-04-05 RX ORDER — PREGABALIN 225 MG/1
1 CAPSULE ORAL
Qty: 0 | Refills: 0 | DISCHARGE
Start: 2023-04-05

## 2023-04-05 RX ORDER — IPRATROPIUM/ALBUTEROL SULFATE 18-103MCG
3 AEROSOL WITH ADAPTER (GRAM) INHALATION
Qty: 0 | Refills: 0 | DISCHARGE
Start: 2023-04-05

## 2023-04-05 RX ORDER — ALBUTEROL 90 UG/1
2 AEROSOL, METERED ORAL
Qty: 0 | Refills: 0 | DISCHARGE
Start: 2023-04-05

## 2023-04-05 RX ADMIN — Medication 3 MILLILITER(S): at 11:54

## 2023-04-05 RX ADMIN — APIXABAN 2.5 MILLIGRAM(S): 2.5 TABLET, FILM COATED ORAL at 17:32

## 2023-04-05 RX ADMIN — ATORVASTATIN CALCIUM 80 MILLIGRAM(S): 80 TABLET, FILM COATED ORAL at 21:31

## 2023-04-05 RX ADMIN — PREGABALIN 1000 MICROGRAM(S): 225 CAPSULE ORAL at 11:38

## 2023-04-05 RX ADMIN — Medication 3 MILLILITER(S): at 05:57

## 2023-04-05 RX ADMIN — BUDESONIDE AND FORMOTEROL FUMARATE DIHYDRATE 2 PUFF(S): 160; 4.5 AEROSOL RESPIRATORY (INHALATION) at 06:10

## 2023-04-05 RX ADMIN — Medication 3 MILLILITER(S): at 23:44

## 2023-04-05 RX ADMIN — CLOPIDOGREL BISULFATE 75 MILLIGRAM(S): 75 TABLET, FILM COATED ORAL at 11:39

## 2023-04-05 RX ADMIN — BUDESONIDE AND FORMOTEROL FUMARATE DIHYDRATE 2 PUFF(S): 160; 4.5 AEROSOL RESPIRATORY (INHALATION) at 17:32

## 2023-04-05 RX ADMIN — TAMSULOSIN HYDROCHLORIDE 0.4 MILLIGRAM(S): 0.4 CAPSULE ORAL at 21:31

## 2023-04-05 RX ADMIN — TIOTROPIUM BROMIDE 2 PUFF(S): 18 CAPSULE ORAL; RESPIRATORY (INHALATION) at 11:39

## 2023-04-05 RX ADMIN — APIXABAN 2.5 MILLIGRAM(S): 2.5 TABLET, FILM COATED ORAL at 06:08

## 2023-04-05 RX ADMIN — Medication 3 MILLILITER(S): at 17:14

## 2023-04-05 RX ADMIN — MIRTAZAPINE 30 MILLIGRAM(S): 45 TABLET, ORALLY DISINTEGRATING ORAL at 21:31

## 2023-04-05 RX ADMIN — Medication 1 MILLIGRAM(S): at 11:39

## 2023-04-05 NOTE — DISCHARGE NOTE NURSING/CASE MANAGEMENT/SOCIAL WORK - NSDCPEFALRISK_GEN_ALL_CORE
For information on Fall & Injury Prevention, visit: [email or web address] OR  [email or web address] OR  https://www.cdc.gov/steadi/patient.html

## 2023-04-05 NOTE — DISCHARGE NOTE PROVIDER - NSDCMRMEDTOKEN_GEN_ALL_CORE_FT
Advair Diskus 500 mcg-50 mcg inhalation powder: 1 inhaled 2 times a day  albuterol 2.5 mg/3 mL (0.083%) inhalation solution: 2 pump(s) inhaled 4 times a day  atorvastatin 80 mg oral tablet: 1 orally once a day (at bedtime)  cyanocobalamin 1000 mcg oral tablet: 1 tab(s) orally once a day  Eliquis 2.5 mg oral tablet: 1 orally 2 times a day  famotidine 20 mg oral tablet: 1 orally once a day  folic acid 1 mg oral tablet: 1 orally once a day  furosemide 20 mg oral tablet: 1 orally once a day  ipratropium-albuterol 0.5 mg-2.5 mg/3 mL inhalation solution: 3 milliliter(s) inhaled every 6 hours  lidocaine 5% topical film: Apply topically to affected area once a day  mirtazapine 30 mg oral tablet: 1 orally once a day (at bedtime)  Myrbetriq 25 mg oral tablet, extended release: 1 orally once a day  Orgovyx 120 mg oral tablet: 1 orally once a day  Plavix 75 mg oral tablet: 1 orally once a day  Spiriva 18 mcg inhalation capsule: 1 inhaled once a day  spironolactone 25 mg oral tablet: 1 orally once a day  tamsulosin 0.4 mg oral capsule: 1 orally once a day  Toprol-XL 25 mg oral tablet, extended release: 1 orally once a day

## 2023-04-05 NOTE — DISCHARGE NOTE PROVIDER - HOSPITAL COURSE
81 y/o male w/ PMHx of CHF w/ ReF, COPD, Prostate Ca, DM type 2, CAD s/p MI Atrial Flutter on Eliquis, [facility], CHF, s/p RT for his Prostate as well, discharged on Life Vest, sent in from  rehab for SOB. Pt admitted for acute on chronic systolic CHF      # Fever with hypotension   - 102+ rectal---now afebrile but still hypotensive (per discussion with cardiology - likely baseline low BP given HF)   - suspect 2/2 PEG site infection, s/p PEG removal by surgery   - ID called, c/w Vanc and Cefepime--off Vancomycin since MRSA negative, continue only Cefepime to complete a 7 day course - last day is today 4/4   - f/u BCx-NGTD  - judicious fluids   - continue midodrine 10mg, BP on lower , pt asymptomatic----will continue to monitor for now, bp might still be low from infection? pt might need bit of fluid (since more on a dry side)       # HF w/ ReF  # Acute on chronic systolic CHF  - start Lasix 40mg IV BID (on hold due to bp) _. now on lasix 40mg PO   - toprol 25 mg po daily spironolactone 25 mg po daily; decreased Spironolactone to 12.5mg Daily   -Farxiga on hold (will reconsider until discharge)   - ECHO with EF 25-30%, cards following, brianne recs  [ ] Monitor BP, and start entresto as tolerated - cardiology recommended to decrease lasix to allow room for entresto   - Strict I/Os, daily wts    # CAD s/p stents  - c/w plavix, statin    # Atrial Flutter  - c/w BB  - c/w Eliquis      # Anemia  - Pt w/ hx of Anemia, BL H/H unknown  - no reported BRBPR or melana  - c/w Folic acid, B12    # DM type 2  - Pt not [facility] meds [facility]  - FS qAC and HS w/ SSI      # COPD  - c/w Inhalers      # Prostate Ca  - Pt reports he is s/p 28 day RT  - On Orgovux; nonformulary; pls have family bring in        # DVT ppx - c/w Eliquis    Pt is full code    Dispo: family wants to pt to be transferred to [facility] Dr. Rain ([phone]), Patient appears more stable. Updated [facility] today

## 2023-04-05 NOTE — PROGRESS NOTE ADULT - ASSESSMENT
83 yo male with likely history of ischemic cardiomyopathy. Also chr AF, CAD s/p PCI 3 years ago, DM2, COPD.  [facility], FTT.  Here for acute dyspnea x one day and productive cough, likely sec to exacerbation of CHF.  No evidence of acute ischemia, but cath [facility].  Is on life vest; not wearing at present, patient needs education regarding its use prior to discharge, Rebecca Vicente was informed and will contact the Rep prior to discharge  repeat TTE with EF 25-30%    Pt with c/o diffused abd discomfort with Temp of 102.6 R on 3/29/23 , PEG site found to be inflamed, indurated, Tube removed, started on ABx    Plan:  Telemetry monitoring  Continue  Lasix po daily   monitor renal function, electrolytes and daily weights.  Optimize GDMT, cont bbl, aldactone. Strongly recommend restarting low dose Entresto prior to discharge, even if it means decreasing dose of diuretics, in order to optimize care.  Symptoms likely worsened by significant anemia, etiology? On Plavix as well as DOAC, this may be chronic anemia, management as per primary team   s/p PEG tube removal, s/p ABx Rx as per ID  [facility] [place], transfer process has been started by medicine team but due to new fever, Dr Rain from [facility] recommended workup and ensuring clinical stability prior to transfer. NO cardiac barriers to transfer to [facility] for [facility]. SBP in 90s -> asymptomatic, no further fever.  As of today pt is accepted to [facility] hospital, awaiting transfer   If no transfer occurs and pt is getting discharged from here, pt and family should be educated on use of Life Vest prior to discharge, Rebecca Hernandez can be reached at  for retraining.

## 2023-04-05 NOTE — PROGRESS NOTE ADULT - ASSESSMENT
83 y/o male w/ PMHx of CHF w/ ReF, COPD, Prostate Ca, DM type 2, CAD s/p MI Atrial Flutter on Eliquis, [facility] for [facility], CHF, s/p RT for his Prostate as well, discharged on Life Vest, sent in from  rehab for SOB. Pt admitted for acute on chronic systolic CHF      # Fever with hypotension   - 102+ rectal---now afebrile but still hypotensive (per discussion with cardiology - likely baseline low BP given HF)   - suspect 2/2 PEG site infection, s/p PEG removal by surgery   - ID called, c/w Vanc and Cefepime--off Vancomycin since MRSA negative, continue only Cefepime to complete a 7 day course - last day is today 4/4   - f/u BCx-NGTD  - judicious fluids   - continue midodrine 10mg, BP on lower , pt asymptomatic----will continue to monitor for now, bp might still be low from infection? pt might need bit of fluid (since more on a dry side)       # HF w/ ReF  # Acute on chronic systolic CHF  - start Lasix 40mg IV BID (on hold due to bp) _. now on lasix 40mg PO   - toprol 25 mg po daily spironolactone 25 mg po daily; decreased Spironolactone to 12.5mg Daily   -Farxiga on hold (will reconsider until discharge)   - ECHO with EF 25-30%, cards following, brianne recs  [ ] Monitor BP, and start entresto as tolerated - cardiology recommended to decrease lasix to allow room for entresto   - Strict I/Os, daily wts    # CAD s/p stents  - c/w plavix, statin    # Atrial Flutter  - c/w BB  - c/w Eliquis      # Anemia  - Pt w/ hx of Anemia, BL H/H unknown  - no reported BRBPR or melana  - c/w Folic acid, B12    # DM type 2  - Pt not [facility] meds [facility]  - FS qAC and HS w/ SSI      # COPD  - c/w Inhalers      # Prostate Ca  - Pt reports he is s/p 28 day RT  - On Orgovux; nonformulary; pls have family bring in        # DVT ppx - c/w Eliquis    Pt is full code    Dispo: family wants to pt to be transferred to [facility] Dr. Rain ([phone]), Patient appears more stable. Updated [facility] today

## 2023-04-05 NOTE — PROGRESS NOTE ADULT - REASON FOR ADMISSION
Acute on Chronic CHF

## 2023-04-05 NOTE — DISCHARGE NOTE PROVIDER - DETAILS OF MALNUTRITION DIAGNOSIS/DIAGNOSES
This patient has been assessed with a concern for Malnutrition and was treated during this hospitalization for the following Nutrition diagnosis/diagnoses:     -  03/29/2023: Moderate protein-calorie malnutrition

## 2023-04-05 NOTE — PROGRESS NOTE ADULT - PROVIDER SPECIALTY LIST ADULT
Cardiology
Gastroenterology
Cardiology
Gastroenterology
Cardiology
Cardiology
Hospitalist
Infectious Disease
Internal Medicine
Internal Medicine
Cardiology
Hospitalist
Infectious Disease
Infectious Disease
Internal Medicine
Internal Medicine
Cardiology
Cardiology
Hospitalist
Hospitalist
Infectious Disease

## 2023-04-05 NOTE — PROGRESS NOTE ADULT - SUBJECTIVE AND OBJECTIVE BOX
Patient is a 82y old  Male who presents with a chief complaint of sob    PAST MEDICAL & SURGICAL HISTORY:  Chronic CHF    Prostate cancer s/p radiation    Type 2 diabetes mellitus    Chronic kidney disease (CKD)    EMANUEL on CPAP    HLD (hyperlipidemia)    History of COPD    CAD s/p PCI    HTN (hypertension)    INTERVAL HISTORY:  	  MEDICATIONS:  MEDICATIONS  (STANDING):  albuterol    0.083%. 2.5 milliGRAM(s) Nebulizer once  albuterol/ipratropium for Nebulization 3 milliLiter(s) Nebulizer every 6 hours  apixaban 2.5 milliGRAM(s) Oral two times a day  atorvastatin 80 milliGRAM(s) Oral at bedtime  budesonide 160 MICROgram(s)/formoterol 4.5 MICROgram(s) Inhaler 2 Puff(s) Inhalation two times a day  clopidogrel Tablet 75 milliGRAM(s) Oral daily  cyanocobalamin 1000 MICROGram(s) Oral daily  folic acid 1 milliGRAM(s) Oral daily  furosemide    Tablet 40 milliGRAM(s) Oral daily  insulin lispro (ADMELOG) corrective regimen sliding scale   SubCutaneous three times a day before meals  insulin lispro (ADMELOG) corrective regimen sliding scale   SubCutaneous at bedtime  metoprolol succinate ER 25 milliGRAM(s) Oral daily  mirtazapine 30 milliGRAM(s) Oral at bedtime  polyethylene glycol 3350 17 Gram(s) Oral daily  spironolactone 12.5 milliGRAM(s) Oral daily  tamsulosin 0.4 milliGRAM(s) Oral at bedtime  tiotropium 2.5 MICROgram(s) Inhaler 2 Puff(s) Inhalation daily    MEDICATIONS  (PRN):  acetaminophen     Tablet .. 650 milliGRAM(s) Oral every 6 hours PRN Temp greater or equal to 38C (100.4F), Mild Pain (1 - 3)  aluminum hydroxide/magnesium hydroxide/simethicone Suspension 30 milliLiter(s) Oral every 4 hours PRN Dyspepsia  dextrose Oral Gel 15 Gram(s) Oral once PRN Blood Glucose LESS THAN 70 milliGRAM(s)/deciliter  melatonin 3 milliGRAM(s) Oral at bedtime PRN Insomnia  ondansetron Injectable 4 milliGRAM(s) IV Push every 8 hours PRN Nausea and/or Vomiting    Vitals:  T(F): 97.4 (04-05-23 @ 15:47), Max: 98.8 (04-05-23 @ 08:40)  HR: 90 (04-05-23 @ 15:47) (77 - 90)  BP: 109/67 (04-05-23 @ 15:47) (94/61 - 168/96)  RR: 20 (04-05-23 @ 10:27) (20 - 20)  SpO2: 93% (04-05-23 @ 15:47) (93% - 98%)    04-04 @ 07:01  -  04-05 @ 07:00  --------------------------------------------------------  IN:  Total IN: 0 mL    OUT:    Voided (mL): 200 mL  Total OUT: 200 mL    Total NET: -200 mL    04-05 @ 07:01  -  04-05 @ 16:24  --------------------------------------------------------  IN:    Oral Fluid: 215 mL  Total IN: 215 mL    OUT:  Total OUT: 0 mL    Total NET: 215 mL    PHYSICAL EXAM:  Neuro: Awake, responsive  CV: S1 S2 RRR + SM  Lungs: diminished to bases  GI: Soft, BS +, ND, NT  Extremities: Trace LE edema    TELEMETRY: sinus    RADIOLOGY:   < from: CT Chest No Cont (03.30.23 @ 19:05) >  Left lower lobe consolidation, likely pneumonia. Recommend follow-up in   6-8 weeks to assess for improvement.    Small pleural effusions.    < end of copied text >    DIAGNOSTIC TESTING:    [x ] Echocardiogram: < from: TTE Echo Complete w/o Contrast w/ Doppler (03.28.23 @ 13:46) >  Left Ventricle: The left ventricle was not well visualized.  Global LV systolic function was severely decreased, on limited views.   Left ventricular ejection fraction, by visual estimation, is 25 to 30%.   Suboptimal endocardial border definition noted, suggest contrast echo for   further evaluation, if clinically warranted.  Right Ventricle: Normal right ventricular size and function.  Left Atrium: The left atrium is normal in size.  Right Atrium: The right atrium is normal in size.  Pericardium: There is no evidence of pericardial effusion.  Mitral Valve: Structurally normal mitral valve, with normal leaflet   excursion. There is mild mitral annular calcification. Mild mitral valve   regurgitation is seen.  Tricuspid Valve: Structurally normal tricuspid valve, with normal leaflet   excursion. Mild tricuspid regurgitation is visualized.  Aortic Valve: Normal trileaflet aortic valve with normal opening. No   evidence of aortic valve regurgitation is seen.  Pulmonic Valve: Structurally normal pulmonic valve, with normal leaflet   excursion. No indication of pulmonicvalve regurgitation.  Aorta: The aortic root and ascending aorta are structurally normal, with   no evidence of dilitation.  Pulmonary Artery: The main pulmonary artery is normal in size.      Summary:   1. Left ventricular ejection fraction, by visual estimation, is 25 to   30%.   2. Suboptimal endocardial border definition noted, suggest contrast echo   for further evaluation, if clinically warranted.   3. Mild mitral annular calcification.   4. Mild mitral valve regurgitation.   5. Mild tricuspid regurgitation.    < end of copied text >    LABS:	 	    03 Apr 2023 06:45    142    |  114    |  13     ----------------------------<  91     3.7     |  24     |  1.15                         7.9    5.30  )-----------( 206      ( 03 Apr 2023 06:45 )             26.0                  Patient is a 82y old  Male who presents with a chief complaint of sob    PAST MEDICAL & SURGICAL HISTORY:  Chronic CHF    Prostate cancer s/p radiation    Type 2 diabetes mellitus    Chronic kidney disease (CKD)    EMANUEL on CPAP    HLD (hyperlipidemia)    History of COPD    CAD s/p PCI    HTN (hypertension)    INTERVAL HISTORY: resting in bed in no acute distress, denies any chest pain or sob  	  MEDICATIONS:  MEDICATIONS  (STANDING):  albuterol    0.083%. 2.5 milliGRAM(s) Nebulizer once  albuterol/ipratropium for Nebulization 3 milliLiter(s) Nebulizer every 6 hours  apixaban 2.5 milliGRAM(s) Oral two times a day  atorvastatin 80 milliGRAM(s) Oral at bedtime  budesonide 160 MICROgram(s)/formoterol 4.5 MICROgram(s) Inhaler 2 Puff(s) Inhalation two times a day  clopidogrel Tablet 75 milliGRAM(s) Oral daily  cyanocobalamin 1000 MICROGram(s) Oral daily  folic acid 1 milliGRAM(s) Oral daily  furosemide    Tablet 40 milliGRAM(s) Oral daily  insulin lispro (ADMELOG) corrective regimen sliding scale   SubCutaneous three times a day before meals  insulin lispro (ADMELOG) corrective regimen sliding scale   SubCutaneous at bedtime  metoprolol succinate ER 25 milliGRAM(s) Oral daily  mirtazapine 30 milliGRAM(s) Oral at bedtime  polyethylene glycol 3350 17 Gram(s) Oral daily  spironolactone 12.5 milliGRAM(s) Oral daily  tamsulosin 0.4 milliGRAM(s) Oral at bedtime  tiotropium 2.5 MICROgram(s) Inhaler 2 Puff(s) Inhalation daily    MEDICATIONS  (PRN):  acetaminophen     Tablet .. 650 milliGRAM(s) Oral every 6 hours PRN Temp greater or equal to 38C (100.4F), Mild Pain (1 - 3)  aluminum hydroxide/magnesium hydroxide/simethicone Suspension 30 milliLiter(s) Oral every 4 hours PRN Dyspepsia  dextrose Oral Gel 15 Gram(s) Oral once PRN Blood Glucose LESS THAN 70 milliGRAM(s)/deciliter  melatonin 3 milliGRAM(s) Oral at bedtime PRN Insomnia  ondansetron Injectable 4 milliGRAM(s) IV Push every 8 hours PRN Nausea and/or Vomiting    Vitals:  T(F): 97.4 (04-05-23 @ 15:47), Max: 98.8 (04-05-23 @ 08:40)  HR: 90 (04-05-23 @ 15:47) (77 - 90)  BP: 109/67 (04-05-23 @ 15:47) (94/61 - 168/96)  RR: 20 (04-05-23 @ 10:27) (20 - 20)  SpO2: 93% (04-05-23 @ 15:47) (93% - 98%)    04-04 @ 07:01  -  04-05 @ 07:00  --------------------------------------------------------  IN:  Total IN: 0 mL    OUT:    Voided (mL): 200 mL  Total OUT: 200 mL    Total NET: -200 mL    04-05 @ 07:01  -  04-05 @ 16:24  --------------------------------------------------------  IN:    Oral Fluid: 215 mL  Total IN: 215 mL    OUT:  Total OUT: 0 mL    Total NET: 215 mL    PHYSICAL EXAM:  Neuro: Awake, responsive  CV: S1 S2 RRR + SM  Lungs: diminished to bases  GI: Soft, BS +, ND, NT  Extremities: Trace LE edema    TELEMETRY: sinus    RADIOLOGY:   < from: CT Chest No Cont (03.30.23 @ 19:05) >  Left lower lobe consolidation, likely pneumonia. Recommend follow-up in   6-8 weeks to assess for improvement.    Small pleural effusions.    < end of copied text >    DIAGNOSTIC TESTING:    [x ] Echocardiogram: < from: TTE Echo Complete w/o Contrast w/ Doppler (03.28.23 @ 13:46) >  Left Ventricle: The left ventricle was not well visualized.  Global LV systolic function was severely decreased, on limited views.   Left ventricular ejection fraction, by visual estimation, is 25 to 30%.   Suboptimal endocardial border definition noted, suggest contrast echo for   further evaluation, if clinically warranted.  Right Ventricle: Normal right ventricular size and function.  Left Atrium: The left atrium is normal in size.  Right Atrium: The right atrium is normal in size.  Pericardium: There is no evidence of pericardial effusion.  Mitral Valve: Structurally normal mitral valve, with normal leaflet   excursion. There is mild mitral annular calcification. Mild mitral valve   regurgitation is seen.  Tricuspid Valve: Structurally normal tricuspid valve, with normal leaflet   excursion. Mild tricuspid regurgitation is visualized.  Aortic Valve: Normal trileaflet aortic valve with normal opening. No   evidence of aortic valve regurgitation is seen.  Pulmonic Valve: Structurally normal pulmonic valve, with normal leaflet   excursion. No indication of pulmonic valve regurgitation.  Aorta: The aortic root and ascending aorta are structurally normal, with   no evidence of dilitation.  Pulmonary Artery: The main pulmonary artery is normal in size.      Summary:   1. Left ventricular ejection fraction, by visual estimation, is 25 to   30%.   2. Suboptimal endocardial border definition noted, suggest contrast echo   for further evaluation, if clinically warranted.   3. Mild mitral annular calcification.   4. Mild mitral valve regurgitation.   5. Mild tricuspid regurgitation.    < end of copied text >    LABS:	 	    03 Apr 2023 06:45    142    |  114    |  13     ----------------------------<  91     3.7     |  24     |  1.15                         7.9    5.30  )-----------( 206      ( 03 Apr 2023 06:45 )             26.0

## 2023-04-05 NOTE — PROGRESS NOTE ADULT - NUTRITIONAL ASSESSMENT
This patient has been assessed with a concern for Malnutrition and has been determined to have a diagnosis/diagnoses of Moderate protein-calorie malnutrition.    This patient is being managed with:   Diet Consistent Carbohydrate w/Evening Snack-  1500mL Fluid Restriction ([id number])  Low Sodium  Supplement Feeding Modality:  Oral  Glucerna Shake Cans or Servings Per Day:  1       Frequency:  Three Times a day  Entered: Mar 31 2023  4:49PM  
This patient has been assessed with a concern for Malnutrition and has been determined to have a diagnosis/diagnoses of Moderate protein-calorie malnutrition.    This patient is being managed with:   Diet Consistent Carbohydrate w/Evening Snack-  1500mL Fluid Restriction ([id number])  Low Sodium  Supplement Feeding Modality:  Oral  Glucerna Shake Cans or Servings Per Day:  1       Frequency:  Three Times a day  Entered: Mar 31 2023  4:49PM  
This patient has been assessed with a concern for Malnutrition and has been determined to have a diagnosis/diagnoses of Moderate protein-calorie malnutrition.    This patient is being managed with:   Diet Consistent Carbohydrate w/Evening Snack-  1500mL Fluid Restriction ([id number])  Low Sodium  Supplement Feeding Modality:  Oral  Glucerna Shake Cans or Servings Per Day:  1       Frequency:  Two Times a day  Entered: Mar 29 2023  1:28PM  
This patient has been assessed with a concern for Malnutrition and has been determined to have a diagnosis/diagnoses of Moderate protein-calorie malnutrition.    This patient is being managed with:   Diet Consistent Carbohydrate w/Evening Snack-  1500mL Fluid Restriction ([id number])  Low Sodium  Supplement Feeding Modality:  Oral  Glucerna Shake Cans or Servings Per Day:  1       Frequency:  Two Times a day  Entered: Mar 29 2023  1:28PM  
This patient has been assessed with a concern for Malnutrition and has been determined to have a diagnosis/diagnoses of Moderate protein-calorie malnutrition.    This patient is being managed with:   Diet Consistent Carbohydrate w/Evening Snack-  1500mL Fluid Restriction ([id number])  Low Sodium  Supplement Feeding Modality:  Oral  Glucerna Shake Cans or Servings Per Day:  1       Frequency:  Three Times a day  Entered: Mar 31 2023  4:49PM  
This patient has been assessed with a concern for Malnutrition and has been determined to have a diagnosis/diagnoses of Moderate protein-calorie malnutrition.    This patient is being managed with:   Diet Consistent Carbohydrate w/Evening Snack-  1500mL Fluid Restriction ([id number])  Low Sodium  Supplement Feeding Modality:  Oral  Glucerna Shake Cans or Servings Per Day:  1       Frequency:  Three Times a day  Entered: Mar 31 2023  4:49PM  
This patient has been assessed with a concern for Malnutrition and has been determined to have a diagnosis/diagnoses of Moderate protein-calorie malnutrition.    This patient is being managed with:   Diet Consistent Carbohydrate w/Evening Snack-  1500mL Fluid Restriction ([id number])  Low Sodium  Supplement Feeding Modality:  Oral  Glucerna Shake Cans or Servings Per Day:  1       Frequency:  Two Times a day  Entered: Mar 29 2023  1:28PM  
This patient has been assessed with a concern for Malnutrition and has been determined to have a diagnosis/diagnoses of Moderate protein-calorie malnutrition.    This patient is being managed with:   Diet Consistent Carbohydrate w/Evening Snack-  1500mL Fluid Restriction ([id number])  Low Sodium  Supplement Feeding Modality:  Oral  Glucerna Shake Cans or Servings Per Day:  1       Frequency:  Three Times a day  Entered: Mar 31 2023  4:49PM  
This patient has been assessed with a concern for Malnutrition and has been determined to have a diagnosis/diagnoses of Moderate protein-calorie malnutrition.    This patient is being managed with:   Diet Consistent Carbohydrate w/Evening Snack-  1500mL Fluid Restriction ([id number])  Low Sodium  Supplement Feeding Modality:  Oral  Glucerna Shake Cans or Servings Per Day:  1       Frequency:  Two Times a day  Entered: Mar 29 2023  1:28PM  
This patient has been assessed with a concern for Malnutrition and has been determined to have a diagnosis/diagnoses of Moderate protein-calorie malnutrition.    This patient is being managed with:   Diet Consistent Carbohydrate w/Evening Snack-  1500mL Fluid Restriction ([id number])  Low Sodium  Supplement Feeding Modality:  Oral  Glucerna Shake Cans or Servings Per Day:  1       Frequency:  Three Times a day  Entered: Mar 31 2023  4:49PM  
This patient has been assessed with a concern for Malnutrition and has been determined to have a diagnosis/diagnoses of Moderate protein-calorie malnutrition.    This patient is being managed with:   Diet Consistent Carbohydrate w/Evening Snack-  1500mL Fluid Restriction ([id number])  Low Sodium  Supplement Feeding Modality:  Oral  Glucerna Shake Cans or Servings Per Day:  1       Frequency:  Three Times a day  Entered: Mar 31 2023  4:49PM  
This patient has been assessed with a concern for Malnutrition and has been determined to have a diagnosis/diagnoses of Moderate protein-calorie malnutrition.    This patient is being managed with:   Diet Consistent Carbohydrate w/Evening Snack-  1500mL Fluid Restriction ([id number])  Low Sodium  Supplement Feeding Modality:  Oral  Glucerna Shake Cans or Servings Per Day:  1       Frequency:  Two Times a day  Entered: Mar 29 2023  1:28PM  
This patient has been assessed with a concern for Malnutrition and has been determined to have a diagnosis/diagnoses of Moderate protein-calorie malnutrition.    This patient is being managed with:   Diet Consistent Carbohydrate w/Evening Snack-  1500mL Fluid Restriction ([id number])  Low Sodium  Supplement Feeding Modality:  Oral  Glucerna Shake Cans or Servings Per Day:  1       Frequency:  Two Times a day  Entered: Mar 29 2023  1:28PM  
This patient has been assessed with a concern for Malnutrition and has been determined to have a diagnosis/diagnoses of Moderate protein-calorie malnutrition.    This patient is being managed with:   Diet DASH/TLC-  Sodium & Cholesterol Restricted  Consistent Carbohydrate {Evening Snack}  1500mL Fluid Restriction ([id number])     Special Instructions for Nursin milliLiter(s) to 2000 milliLiter(s) fluid restriction  Entered: Mar 25 2023  5:57PM    The following pending diet order is being considered for treatment of Moderate protein-calorie malnutrition:  Diet Consistent Carbohydrate w/Evening Snack-  1500mL Fluid Restriction ([id number])  Low Sodium  Supplement Feeding Modality:  Oral  Glucerna Shake Cans or Servings Per Day:  1       Frequency:  Two Times a day  Entered: Mar 29 2023  1:28PM

## 2023-04-05 NOTE — PROGRESS NOTE ADULT - SUBJECTIVE AND OBJECTIVE BOX
Patient seen and examined  tapered off to room air  Vitals stable  updated [facility] on patients clinical status    Review of Systems:  General:denies fever chills, headache, weakness  HEENT: denies blurry vision,diffculty swallowing, difficulty hearing, tinnitus  Cardiovascular: denies chest pain  ,palpitations  Pulmonary:denies shortness of breath, cough, wheezing, hemoptysis  Gastrointestinal: denies abdominal pain, constipation, diarrhea,nausea , vomiting, hematochezia  : denies hematuria, dysuria, or incontinence  Neurological: denies weakness, numbness , tingling, dizziness, tremors  MSK: denies muscle pain, difficulty ambulating, swelling, back pain  skin: denies skin rash, itching, burning, or  skin lesions  Psychiatrical: denies mood disturbances, anxierty, feeling depressed, depression , or difficulty sleeping    Objective:  Vitals  T(C): 37.1 (04-05-23 @ 08:40), Max: 37.1 (04-04-23 @ 15:42)  HR: 82 (04-05-23 @ 08:40) (77 - 86)  BP: 94/61 (04-05-23 @ 08:40) (94/61 - 168/96)  RR: 18 (04-04-23 @ 15:42) (18 - 18)  SpO2: 95% (04-05-23 @ 08:40) (94% - 98%)    Physical Exam:  General: comfortable, no acute distress  HEENT: Atraumatic, no LAD, trachea midline, PERRLA  Cardiovascular: normal s1s2, no murmurs, gallops or fricition rubs  Pulmonary: clear to ausculation Bilaterally, no wheezing , rhonchi  Gastrointestinal: soft non tender non distended, no masses felt, no organomegally  Muscloskeletal: no lower extremity edema, intact bilateral lower extremity pulses  Neurological: CN II-12 intact. No focal weakness  Psychiatrical: normal mood, cooperative  SKIN: no rash, lesions or ulcers    Labs:                    Active Medications  MEDICATIONS  (STANDING):  albuterol    0.083%. 2.5 milliGRAM(s) Nebulizer once  albuterol/ipratropium for Nebulization 3 milliLiter(s) Nebulizer every 6 hours  apixaban 2.5 milliGRAM(s) Oral two times a day  atorvastatin 80 milliGRAM(s) Oral at bedtime  budesonide 160 MICROgram(s)/formoterol 4.5 MICROgram(s) Inhaler 2 Puff(s) Inhalation two times a day  clopidogrel Tablet 75 milliGRAM(s) Oral daily  cyanocobalamin 1000 MICROGram(s) Oral daily  dextrose 5%. 1000 milliLiter(s) (50 mL/Hr) IV Continuous <Continuous>  dextrose 5%. 1000 milliLiter(s) (100 mL/Hr) IV Continuous <Continuous>  dextrose 50% Injectable 25 Gram(s) IV Push once  dextrose 50% Injectable 12.5 Gram(s) IV Push once  dextrose 50% Injectable 25 Gram(s) IV Push once  folic acid 1 milliGRAM(s) Oral daily  furosemide    Tablet 40 milliGRAM(s) Oral daily  glucagon  Injectable 1 milliGRAM(s) IntraMuscular once  insulin lispro (ADMELOG) corrective regimen sliding scale   SubCutaneous three times a day before meals  insulin lispro (ADMELOG) corrective regimen sliding scale   SubCutaneous at bedtime  metoprolol succinate ER 25 milliGRAM(s) Oral daily  mirtazapine 30 milliGRAM(s) Oral at bedtime  polyethylene glycol 3350 17 Gram(s) Oral daily  spironolactone 12.5 milliGRAM(s) Oral daily  tamsulosin 0.4 milliGRAM(s) Oral at bedtime  tiotropium 2.5 MICROgram(s) Inhaler 2 Puff(s) Inhalation daily    MEDICATIONS  (PRN):  acetaminophen     Tablet .. 650 milliGRAM(s) Oral every 6 hours PRN Temp greater or equal to 38C (100.4F), Mild Pain (1 - 3)  aluminum hydroxide/magnesium hydroxide/simethicone Suspension 30 milliLiter(s) Oral every 4 hours PRN Dyspepsia  dextrose Oral Gel 15 Gram(s) Oral once PRN Blood Glucose LESS THAN 70 milliGRAM(s)/deciliter  melatonin 3 milliGRAM(s) Oral at bedtime PRN Insomnia  ondansetron Injectable 4 milliGRAM(s) IV Push every 8 hours PRN Nausea and/or Vomiting

## 2023-04-05 NOTE — DISCHARGE NOTE NURSING/CASE MANAGEMENT/SOCIAL WORK - PATIENT PORTAL LINK FT
You can access the FollowMyHealth Patient Portal offered by [facility] [place] by registering at the following website: [email or web address]. By joining [employer]’s FollowMyHealth portal, you will also be able to view your health information using other applications (apps) compatible with our system.

## 2023-04-06 VITALS
RESPIRATION RATE: 19 BRPM | SYSTOLIC BLOOD PRESSURE: 92 MMHG | OXYGEN SATURATION: 94 % | DIASTOLIC BLOOD PRESSURE: 56 MMHG | TEMPERATURE: 97 F | HEART RATE: 77 BPM

## 2023-04-06 LAB
GLUCOSE BLDC GLUCOMTR-MCNC: 109 MG/DL — HIGH (ref 70–99)
GLUCOSE BLDC GLUCOMTR-MCNC: 117 MG/DL — HIGH (ref 70–99)

## 2023-04-06 PROCEDURE — 99238 HOSP IP/OBS DSCHRG MGMT 30/<: CPT

## 2023-04-06 RX ADMIN — Medication 1 MILLIGRAM(S): at 12:18

## 2023-04-06 RX ADMIN — TIOTROPIUM BROMIDE 2 PUFF(S): 18 CAPSULE ORAL; RESPIRATORY (INHALATION) at 12:18

## 2023-04-06 RX ADMIN — Medication 3 MILLILITER(S): at 05:25

## 2023-04-06 RX ADMIN — PREGABALIN 1000 MICROGRAM(S): 225 CAPSULE ORAL at 12:17

## 2023-04-06 RX ADMIN — CLOPIDOGREL BISULFATE 75 MILLIGRAM(S): 75 TABLET, FILM COATED ORAL at 12:17

## 2023-04-06 RX ADMIN — BUDESONIDE AND FORMOTEROL FUMARATE DIHYDRATE 2 PUFF(S): 160; 4.5 AEROSOL RESPIRATORY (INHALATION) at 05:54

## 2023-04-06 RX ADMIN — Medication 25 MILLIGRAM(S): at 05:54

## 2023-04-06 RX ADMIN — APIXABAN 2.5 MILLIGRAM(S): 2.5 TABLET, FILM COATED ORAL at 05:54

## 2023-04-10 DIAGNOSIS — Z79.01 LONG TERM (CURRENT) USE OF ANTICOAGULANTS: ICD-10-CM

## 2023-04-10 DIAGNOSIS — D64.9 ANEMIA, UNSPECIFIED: ICD-10-CM

## 2023-04-10 DIAGNOSIS — C61 MALIGNANT NEOPLASM OF PROSTATE: ICD-10-CM

## 2023-04-10 DIAGNOSIS — I25.10 ATHEROSCLEROTIC HEART DISEASE OF NATIVE CORONARY ARTERY WITHOUT ANGINA PECTORIS: ICD-10-CM

## 2023-04-10 DIAGNOSIS — N17.9 ACUTE KIDNEY FAILURE, UNSPECIFIED: ICD-10-CM
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
## 2023-04-10 DIAGNOSIS — Z79.02 LONG TERM (CURRENT) USE OF ANTITHROMBOTICS/ANTIPLATELETS: ICD-10-CM

## 2023-04-10 DIAGNOSIS — I48.92 UNSPECIFIED ATRIAL FLUTTER: ICD-10-CM

## 2023-04-10 DIAGNOSIS — Z92.3 PERSONAL HISTORY OF IRRADIATION: ICD-10-CM

## 2023-04-10 DIAGNOSIS — I50.23 ACUTE ON CHRONIC SYSTOLIC (CONGESTIVE) HEART FAILURE: ICD-10-CM
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
## 2023-04-10 DIAGNOSIS — Z88.8 ALLERGY STATUS TO OTHER DRUGS, MEDICAMENTS AND BIOLOGICAL SUBSTANCES: ICD-10-CM

## 2023-04-10 DIAGNOSIS — G47.33 OBSTRUCTIVE SLEEP APNEA (ADULT) (PEDIATRIC): ICD-10-CM

## 2023-04-10 DIAGNOSIS — Z91.010 ALLERGY TO PEANUTS: ICD-10-CM

## 2023-04-10 DIAGNOSIS — J44.9 CHRONIC OBSTRUCTIVE PULMONARY DISEASE, UNSPECIFIED: ICD-10-CM

## 2023-04-10 DIAGNOSIS — E11.9 TYPE 2 DIABETES MELLITUS WITHOUT COMPLICATIONS: ICD-10-CM
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
## 2023-04-10 DIAGNOSIS — I95.9 HYPOTENSION, UNSPECIFIED: ICD-10-CM

## 2023-04-10 DIAGNOSIS — T85.79XA INFECTION AND INFLAMMATORY REACTION DUE TO OTHER INTERNAL PROSTHETIC DEVICES, IMPLANTS AND GRAFTS, INITIAL ENCOUNTER: ICD-10-CM

## 2023-04-10 DIAGNOSIS — Z95.5 PRESENCE OF CORONARY ANGIOPLASTY IMPLANT AND GRAFT: ICD-10-CM

## 2023-04-10 DIAGNOSIS — E44.0 MODERATE PROTEIN-CALORIE MALNUTRITION: ICD-10-CM

## 2023-04-10 DIAGNOSIS — I11.0 HYPERTENSIVE HEART DISEASE WITH HEART FAILURE: ICD-10-CM

## 2023-04-10 DIAGNOSIS — I71.42 JUXTARENAL ABDOMINAL AORTIC ANEURYSM, WITHOUT RUPTURE: ICD-10-CM

## 2025-03-31 NOTE — PHYSICAL THERAPY INITIAL EVALUATION ADULT - PATIENT/FAMILY/SIGNIFICANT OTHER GOALS STATEMENT, PT EVAL
PDMP reviewed; no aberrant behavior identified, prescription authorized. Rx sent via e-prescribe using [employer] authentication.   
TO be free from SOB/CISSE during all functional mobility